# Patient Record
Sex: FEMALE | Race: OTHER | Employment: UNEMPLOYED | URBAN - METROPOLITAN AREA
[De-identification: names, ages, dates, MRNs, and addresses within clinical notes are randomized per-mention and may not be internally consistent; named-entity substitution may affect disease eponyms.]

---

## 2019-12-10 ENCOUNTER — APPOINTMENT (OUTPATIENT)
Dept: GENERAL RADIOLOGY | Facility: HOSPITAL | Age: 59
DRG: 638 | End: 2019-12-10
Attending: EMERGENCY MEDICINE
Payer: MEDICAID

## 2019-12-10 ENCOUNTER — HOSPITAL ENCOUNTER (INPATIENT)
Facility: HOSPITAL | Age: 59
LOS: 3 days | Discharge: HOME | DRG: 638 | End: 2019-12-13
Attending: EMERGENCY MEDICINE | Admitting: INTERNAL MEDICINE
Payer: MEDICAID

## 2019-12-10 ENCOUNTER — APPOINTMENT (OUTPATIENT)
Dept: GENERAL RADIOLOGY | Facility: HOSPITAL | Age: 59
DRG: 638 | End: 2019-12-10
Attending: INTERNAL MEDICINE
Payer: MEDICAID

## 2019-12-10 DIAGNOSIS — E11.621 DIABETIC ULCER OF LEFT MIDFOOT ASSOCIATED WITH TYPE 2 DIABETES MELLITUS, WITH NECROSIS OF MUSCLE (HCC): Primary | ICD-10-CM

## 2019-12-10 DIAGNOSIS — E11.649 TYPE 2 DIABETES MELLITUS WITH HYPOGLYCEMIA WITHOUT COMA, WITH LONG-TERM CURRENT USE OF INSULIN (HCC): ICD-10-CM

## 2019-12-10 DIAGNOSIS — R11.2 NAUSEA AND VOMITING IN ADULT: ICD-10-CM

## 2019-12-10 DIAGNOSIS — Z79.4 TYPE 2 DIABETES MELLITUS WITH HYPOGLYCEMIA WITHOUT COMA, WITH LONG-TERM CURRENT USE OF INSULIN (HCC): ICD-10-CM

## 2019-12-10 DIAGNOSIS — L97.423 DIABETIC ULCER OF LEFT MIDFOOT ASSOCIATED WITH TYPE 2 DIABETES MELLITUS, WITH NECROSIS OF MUSCLE (HCC): Primary | ICD-10-CM

## 2019-12-10 PROCEDURE — 85652 RBC SED RATE AUTOMATED: CPT | Performed by: INTERNAL MEDICINE

## 2019-12-10 PROCEDURE — 86140 C-REACTIVE PROTEIN: CPT | Performed by: INTERNAL MEDICINE

## 2019-12-10 PROCEDURE — 96375 TX/PRO/DX INJ NEW DRUG ADDON: CPT

## 2019-12-10 PROCEDURE — 99285 EMERGENCY DEPT VISIT HI MDM: CPT

## 2019-12-10 PROCEDURE — 83036 HEMOGLOBIN GLYCOSYLATED A1C: CPT | Performed by: INTERNAL MEDICINE

## 2019-12-10 PROCEDURE — 82962 GLUCOSE BLOOD TEST: CPT

## 2019-12-10 PROCEDURE — 73630 X-RAY EXAM OF FOOT: CPT | Performed by: EMERGENCY MEDICINE

## 2019-12-10 PROCEDURE — 96365 THER/PROPH/DIAG IV INF INIT: CPT

## 2019-12-10 PROCEDURE — 80048 BASIC METABOLIC PNL TOTAL CA: CPT | Performed by: EMERGENCY MEDICINE

## 2019-12-10 PROCEDURE — 74018 RADEX ABDOMEN 1 VIEW: CPT | Performed by: INTERNAL MEDICINE

## 2019-12-10 PROCEDURE — 96367 TX/PROPH/DG ADDL SEQ IV INF: CPT

## 2019-12-10 PROCEDURE — 85025 COMPLETE CBC W/AUTO DIFF WBC: CPT | Performed by: EMERGENCY MEDICINE

## 2019-12-10 RX ORDER — HYDROCODONE BITARTRATE AND ACETAMINOPHEN 5; 325 MG/1; MG/1
2 TABLET ORAL EVERY 4 HOURS PRN
Status: DISCONTINUED | OUTPATIENT
Start: 2019-12-10 | End: 2019-12-13

## 2019-12-10 RX ORDER — FUROSEMIDE 20 MG/1
20 TABLET ORAL DAILY
Status: DISCONTINUED | OUTPATIENT
Start: 2019-12-11 | End: 2019-12-11

## 2019-12-10 RX ORDER — FAMOTIDINE 20 MG/1
40 TABLET ORAL DAILY
Status: DISCONTINUED | OUTPATIENT
Start: 2019-12-11 | End: 2019-12-13

## 2019-12-10 RX ORDER — SODIUM PHOSPHATE, DIBASIC AND SODIUM PHOSPHATE, MONOBASIC 7; 19 G/133ML; G/133ML
1 ENEMA RECTAL ONCE AS NEEDED
Status: DISCONTINUED | OUTPATIENT
Start: 2019-12-10 | End: 2019-12-13

## 2019-12-10 RX ORDER — METOCLOPRAMIDE HYDROCHLORIDE 5 MG/ML
10 INJECTION INTRAMUSCULAR; INTRAVENOUS EVERY 8 HOURS PRN
Status: DISCONTINUED | OUTPATIENT
Start: 2019-12-10 | End: 2019-12-13

## 2019-12-10 RX ORDER — SODIUM CHLORIDE 9 MG/ML
INJECTION, SOLUTION INTRAVENOUS CONTINUOUS
Status: DISCONTINUED | OUTPATIENT
Start: 2019-12-10 | End: 2019-12-11

## 2019-12-10 RX ORDER — ONDANSETRON 2 MG/ML
4 INJECTION INTRAMUSCULAR; INTRAVENOUS EVERY 4 HOURS PRN
Status: DISCONTINUED | OUTPATIENT
Start: 2019-12-10 | End: 2019-12-10

## 2019-12-10 RX ORDER — ATORVASTATIN CALCIUM 20 MG/1
20 TABLET, FILM COATED ORAL NIGHTLY
Status: DISCONTINUED | OUTPATIENT
Start: 2019-12-10 | End: 2019-12-13

## 2019-12-10 RX ORDER — ACETAMINOPHEN 325 MG/1
650 TABLET ORAL EVERY 4 HOURS PRN
Status: DISCONTINUED | OUTPATIENT
Start: 2019-12-10 | End: 2019-12-13

## 2019-12-10 RX ORDER — DOCUSATE SODIUM 100 MG/1
100 CAPSULE, LIQUID FILLED ORAL 2 TIMES DAILY
Status: DISCONTINUED | OUTPATIENT
Start: 2019-12-10 | End: 2019-12-11

## 2019-12-10 RX ORDER — ISOSORBIDE MONONITRATE 30 MG/1
30 TABLET, EXTENDED RELEASE ORAL DAILY
Status: ON HOLD | COMMUNITY
End: 2020-11-07

## 2019-12-10 RX ORDER — LOSARTAN POTASSIUM 100 MG/1
100 TABLET ORAL DAILY
Status: ON HOLD | COMMUNITY
End: 2020-11-07

## 2019-12-10 RX ORDER — ONDANSETRON 2 MG/ML
4 INJECTION INTRAMUSCULAR; INTRAVENOUS ONCE
Status: COMPLETED | OUTPATIENT
Start: 2019-12-10 | End: 2019-12-10

## 2019-12-10 RX ORDER — BISACODYL 10 MG
10 SUPPOSITORY, RECTAL RECTAL
Status: DISCONTINUED | OUTPATIENT
Start: 2019-12-10 | End: 2019-12-13

## 2019-12-10 RX ORDER — CARVEDILOL 25 MG/1
25 TABLET ORAL 2 TIMES DAILY WITH MEALS
Status: DISCONTINUED | OUTPATIENT
Start: 2019-12-10 | End: 2019-12-13

## 2019-12-10 RX ORDER — ONDANSETRON 2 MG/ML
4 INJECTION INTRAMUSCULAR; INTRAVENOUS EVERY 6 HOURS PRN
Status: DISCONTINUED | OUTPATIENT
Start: 2019-12-10 | End: 2019-12-13

## 2019-12-10 RX ORDER — CARVEDILOL 25 MG/1
25 TABLET ORAL 2 TIMES DAILY WITH MEALS
COMMUNITY

## 2019-12-10 RX ORDER — DEXTROSE MONOHYDRATE 25 G/50ML
50 INJECTION, SOLUTION INTRAVENOUS AS NEEDED
Status: DISCONTINUED | OUTPATIENT
Start: 2019-12-10 | End: 2019-12-13

## 2019-12-10 RX ORDER — SODIUM CHLORIDE 0.9 % (FLUSH) 0.9 %
3 SYRINGE (ML) INJECTION AS NEEDED
Status: DISCONTINUED | OUTPATIENT
Start: 2019-12-10 | End: 2019-12-13

## 2019-12-10 RX ORDER — ATORVASTATIN CALCIUM 20 MG/1
20 TABLET, FILM COATED ORAL NIGHTLY
COMMUNITY

## 2019-12-10 RX ORDER — POLYETHYLENE GLYCOL 3350 17 G/17G
17 POWDER, FOR SOLUTION ORAL DAILY PRN
Status: DISCONTINUED | OUTPATIENT
Start: 2019-12-10 | End: 2019-12-13

## 2019-12-10 RX ORDER — DEXTROSE AND SODIUM CHLORIDE 5; .45 G/100ML; G/100ML
INJECTION, SOLUTION INTRAVENOUS ONCE
Status: COMPLETED | OUTPATIENT
Start: 2019-12-10 | End: 2019-12-10

## 2019-12-10 RX ORDER — FAMOTIDINE 20 MG/1
40 TABLET ORAL DAILY
Status: ON HOLD | COMMUNITY
End: 2021-04-16

## 2019-12-10 RX ORDER — IBUPROFEN 600 MG/1
600 TABLET ORAL ONCE
Status: COMPLETED | OUTPATIENT
Start: 2019-12-10 | End: 2019-12-10

## 2019-12-10 RX ORDER — HYDROCODONE BITARTRATE AND ACETAMINOPHEN 5; 325 MG/1; MG/1
1 TABLET ORAL EVERY 4 HOURS PRN
Status: DISCONTINUED | OUTPATIENT
Start: 2019-12-10 | End: 2019-12-13

## 2019-12-10 RX ORDER — ISOSORBIDE MONONITRATE 60 MG/1
30 TABLET, EXTENDED RELEASE ORAL DAILY
Status: DISCONTINUED | OUTPATIENT
Start: 2019-12-11 | End: 2019-12-13

## 2019-12-10 RX ORDER — LOSARTAN POTASSIUM 100 MG/1
100 TABLET ORAL DAILY
Status: DISCONTINUED | OUTPATIENT
Start: 2019-12-11 | End: 2019-12-13

## 2019-12-10 RX ORDER — POTASSIUM CHLORIDE 1500 MG/1
20 TABLET, FILM COATED, EXTENDED RELEASE ORAL 2 TIMES DAILY
COMMUNITY
End: 2021-04-15

## 2019-12-10 RX ORDER — FUROSEMIDE 20 MG/1
80 TABLET ORAL DAILY
Status: ON HOLD | COMMUNITY
End: 2020-11-07

## 2019-12-10 RX ORDER — METOPROLOL SUCCINATE 25 MG/1
25 TABLET, EXTENDED RELEASE ORAL DAILY
Status: ON HOLD | COMMUNITY
End: 2019-12-13

## 2019-12-10 NOTE — ED INITIAL ASSESSMENT (HPI)
Patient here with pain and swelling to her left lower leg that began last night. Patient had surgery on this ankle in May of this year. Patient began to vomit two days ago, and is unable to keep anything down. Patient also says she is very tired.

## 2019-12-10 NOTE — ED NOTES
Pt presents with several complaints including n/v, nonbloody emesis, x2 days and LLE pain and swelling x5 days. Endorses difficulty walking. On exam, L foot is swollen, red. Wound to underside of foot. Foul odor noted.  Pulses difficult to palpate  PMH DM,

## 2019-12-11 ENCOUNTER — APPOINTMENT (OUTPATIENT)
Dept: MRI IMAGING | Facility: HOSPITAL | Age: 59
DRG: 638 | End: 2019-12-11
Attending: INTERNAL MEDICINE
Payer: MEDICAID

## 2019-12-11 PROBLEM — I10 HTN (HYPERTENSION): Status: ACTIVE | Noted: 2019-12-11

## 2019-12-11 PROBLEM — I25.10 CAD (CORONARY ARTERY DISEASE): Status: ACTIVE | Noted: 2019-12-11

## 2019-12-11 PROBLEM — E78.5 HYPERLIPIDEMIA: Status: ACTIVE | Noted: 2019-12-11

## 2019-12-11 PROCEDURE — 85610 PROTHROMBIN TIME: CPT | Performed by: INTERNAL MEDICINE

## 2019-12-11 PROCEDURE — 82962 GLUCOSE BLOOD TEST: CPT

## 2019-12-11 PROCEDURE — 73720 MRI LWR EXTREMITY W/O&W/DYE: CPT | Performed by: INTERNAL MEDICINE

## 2019-12-11 PROCEDURE — A9575 INJ GADOTERATE MEGLUMI 0.1ML: HCPCS | Performed by: HOSPITALIST

## 2019-12-11 PROCEDURE — 83735 ASSAY OF MAGNESIUM: CPT | Performed by: INTERNAL MEDICINE

## 2019-12-11 PROCEDURE — 85027 COMPLETE CBC AUTOMATED: CPT | Performed by: INTERNAL MEDICINE

## 2019-12-11 PROCEDURE — 99213 OFFICE O/P EST LOW 20 MIN: CPT

## 2019-12-11 PROCEDURE — 80048 BASIC METABOLIC PNL TOTAL CA: CPT | Performed by: INTERNAL MEDICINE

## 2019-12-11 RX ORDER — BISACODYL 10 MG
10 SUPPOSITORY, RECTAL RECTAL
Status: DISCONTINUED | OUTPATIENT
Start: 2019-12-11 | End: 2019-12-11

## 2019-12-11 RX ORDER — SODIUM PHOSPHATE, DIBASIC AND SODIUM PHOSPHATE, MONOBASIC 7; 19 G/133ML; G/133ML
1 ENEMA RECTAL ONCE AS NEEDED
Status: DISCONTINUED | OUTPATIENT
Start: 2019-12-11 | End: 2019-12-11

## 2019-12-11 RX ORDER — DEXTROSE AND SODIUM CHLORIDE 5; .45 G/100ML; G/100ML
INJECTION, SOLUTION INTRAVENOUS CONTINUOUS
Status: DISCONTINUED | OUTPATIENT
Start: 2019-12-11 | End: 2019-12-11

## 2019-12-11 RX ORDER — POLYETHYLENE GLYCOL 3350 17 G/17G
17 POWDER, FOR SOLUTION ORAL DAILY
Status: DISCONTINUED | OUTPATIENT
Start: 2019-12-11 | End: 2019-12-13

## 2019-12-11 RX ORDER — DOCUSATE SODIUM 100 MG/1
100 CAPSULE, LIQUID FILLED ORAL 2 TIMES DAILY
Status: DISCONTINUED | OUTPATIENT
Start: 2019-12-11 | End: 2019-12-13

## 2019-12-11 RX ORDER — POLYETHYLENE GLYCOL 3350 17 G/17G
17 POWDER, FOR SOLUTION ORAL DAILY PRN
Status: DISCONTINUED | OUTPATIENT
Start: 2019-12-11 | End: 2019-12-11

## 2019-12-11 NOTE — CM/SW NOTE
12/11/19 1200   CM/SW Referral Data   Referral Source Physician   Reason for Referral Discharge planning   Informant Patient      Method of Interpretation Translation line    Translated To Assessment/Screening; Discharge;Education

## 2019-12-11 NOTE — CONSULTS
[facility]    Report of Consultation    Dennie Brick Patient Status:  Inpatient    1960 MRN [id number]   Location [facility] 5SW/SE Attending Alyse Estevez MD   [facility] Day # 1 PCP None Pcp     Date of Admission:  12/10/2019  Date PRN  •  PEG 3350 (MIRALAX) powder packet 17 g, 17 g, Oral, Daily PRN  •  magnesium hydroxide (MILK OF MAGNESIA) 400 MG/5ML suspension 30 mL, 30 mL, Oral, Daily PRN  •  bisacodyl (DULCOLAX) rectal suppository 10 mg, 10 mg, Rectal, Daily PRN  •  FLEET ENEMA secondary to the edema on the left lower extremity  Neurologic: Patient does not have good pain sensation. Musculoskeletal: The patient has good muscle strength she is ambulatory. He has a Charcot foot deformity.     Xr Abdomen (1 View) (cpt=74018)    Res to diabetic osteoarthropathy over active osteomyelitis. 3. Large plantar calcaneal spur with mild chronic plantar fasciitis.   4. Diffuse edema and severe atrophy throughout the visualized intrinsic foot musculature, likely relating to acute on chronic diab

## 2019-12-11 NOTE — PLAN OF CARE
Problem: Patient Centered Care  Goal: Patient preferences are identified and integrated in the patient's plan of care  Description  Interventions:  - What would you like us to know as we care for you?  \"I speak Kinyarwanda\"  - Provide timely, complete, and acc response  - Implement non-pharmacological measures as appropriate and evaluate response  - Consider cultural and social influences on pain and pain management  - Manage/alleviate anxiety  - Utilize distraction and/or relaxation techniques  - Monitor for op their own health  - Refer to Case Management Department for coordinating discharge planning if the patient needs post-hospital services based on physician/LIP order or complex needs related to functional status, cognitive ability or social support system

## 2019-12-11 NOTE — WOUND PROGRESS NOTE
WOUND CARE NOTE    History:  Past Medical History:   Diagnosis Date   • Coronary bypass graft mechanical complication    • Diabetes [facility])    • Essential hypertension    • Hyperlipemia      Past Surgical History:   Procedure Laterality Date   • CABG     • Spent 30 minutes.      Nikita Lockett RN  [address] [facility]  [phone]

## 2019-12-11 NOTE — PROGRESS NOTES
Called [facility] [phone]  For left lower extremity cam boot,orders faxed,per tech will be here this evening

## 2019-12-11 NOTE — PHYSICAL THERAPY NOTE
PT order received and chart reviewed. Per podiatry \" For right now the patient requires offloading will have to get her into a cam boot for offloading will call [facility] labs for that \"  Will follow up for skilled PT after cam boot is in place.

## 2019-12-11 NOTE — ED NOTES
Orders for admission, patient is aware of plan and ready to go upstairs. Any questions, please call ED RN Ernestina  at extension [phone].

## 2019-12-11 NOTE — H&P
[facility] Hospitalist Team  History and Physical  Admit Date:  12/10/19     ASSESSMENT / PLAN:   68-year-old female with history of CAD status post CABG, diabetes mellitus, hypertension, hyperlipidemia who presents with left foot pain with associated ulcer with movement in 2 days. She also noted that she was having left foot pain. She had a previous left foot fracture and had surgery in May 2019. She denies fever though she has been having chills. Patient tells me that she was told she has a bone infection. Tab, Take 40 mg by mouth daily. , Disp: , Rfl:   Potassium Chloride ER 20 MEQ Oral Tab CR, Take 20 mEq by mouth 2 (two) times daily. , Disp: , Rfl:   insulin glargine 100 UNIT/ML Subcutaneous Solution Pen-injector, Inject 40 Units into the skin 2 (two) times subcutaneous abscess. Diffuse T2 hyperintensity throughout the subcutaneous fat of the foot with overlying skin thickening could relate to edema or cellulitis; correlate clinically.  2. Extensive disorganization/fragmentation again noted involving the midf SpO2 98%   BMI 26.89 kg/m²     Gen: No acute distress, alert and oriented x3  Neck Supple, no JVD  Pulm: Lungs clear, normal respiratory effort,   CV: Heart with regular rate and rhythm, No murmurs, rubs, gallops  Abd: Abdomen soft, nontender, nondistende

## 2019-12-11 NOTE — PROGRESS NOTES
[facility] Dosing Service    Initial Pharmacokinetic Consult for Vancomycin Dosing     Saira Heard is a 62year old female who is being treated for cellulitis. Pharmacy has been asked to dose Vancomycin by Dr. Page Cost    She has No Known Allergies.     Cu

## 2019-12-11 NOTE — CONSULTS
[facility] AND [facility] Infectious Disease Consult    Hector Wong Patient Status:  Inpatient    1960 MRN [id number]   Location [facility] 5SW/SE Attending Adriana Cummins MD   Hosp Day # 1 PCP None Pcp     Reason for Consultation:   To help with i mL, Intravenous, PRN  •  acetaminophen (TYLENOL) tab 650 mg, 650 mg, Oral, Q4H PRN **OR** HYDROcodone-acetaminophen (NORCO) 5-325 MG per tab 1 tablet, 1 tablet, Oral, Q4H PRN **OR** HYDROcodone-acetaminophen (NORCO) 5-325 MG per tab 2 tablet, 2 tablet, [facility] cough, sputum, hemoptysis, chest pain, wheezing, dyspnea on exertion, or stridor. Cardiovascular: Negative for chest pain, palpitations, irregular heart beats, syncope, fatigue, orthopnea, paroxysmal nocturnal dyspnea, lower extremity edema.   Gastrointest chest rise   Chest wall: No tenderness or deformity. Heart: Regular rate and rhythm, normal S1S2, no murmur. Abdomen: soft, non-tender, non-distended, no masses, no guarding, no     rebound, positive BS.    Extremity: no edema, no cyanosis   Skin: No ra visualized intrinsic foot musculature, likely relating to acute on chronic diabetic myopathy.          Cultures:  Reviewed,     Assessment and Plan:  Patient Active Problem List:     Diabetic ulcer of left midfoot associated with type 2 diabetes mellitus, w

## 2019-12-11 NOTE — ED PROVIDER NOTES
Patient Seen in: [facility] AND [facility] Emergency Department    History   No chief complaint on file.       HPI    Patient with a past medical history of CAD and CABG, diabetes and hypertension presents to the ED complaining of swelling and pain to her left fo 25 mg by mouth 2 (two) times daily with meals. , Disp: , Rfl: , 12/9/2019 at Unknown time         No family history on file.     Smoking Status: Social History    Socioeconomic History      Marital status:       Spouse name: Not on file      Number of person, place, and time. Skin: Skin is warm and dry. Psychiatric: She has a normal mood and affect. Her behavior is normal.   Nursing note and vitals reviewed.       ED Course        Labs Reviewed   SED RATE, WESTERGREN (AUTOMATED) - Abnormal; Notable f Abnormal            Final result                                                 Please view results for these tests on the individual orders.    BASIC METABOLIC PANEL (8)   MAGNESIUM   CBC, PLATELET; NO DIFFERENTIAL   PROTHROMBIN TIME (PT)   RAINBOW DRAW B (DEX4) oral liquid 15 g (has no administration in time range)   Vancomycin HCl (VANCOCIN) 1,000 mg in sodium chloride 0.9% 250 mL IVPB add-vantage (has no administration in time range)   Piperacillin Sod-Tazobactam So (ZOSYN) 3.375 g in dextrose 5 % 100 mL Interpretation:   normal sinus rhythm    Differential Diagnosis/ Diagnostic Considerations: Diabetic left foot ulcer, foot cellulitis, dehydration, nausea vomiting    Medical Record Review: I personally reviewed available prior medical records for any rece Nausea and vomiting in adult R11.2 12/10/2019     Type 2 diabetes mellitus with hypoglycemia without coma, with long-term current use of insulin ([facility] E11.649, Z79.4 12/10/2019

## 2019-12-12 PROCEDURE — 97530 THERAPEUTIC ACTIVITIES: CPT

## 2019-12-12 PROCEDURE — 97162 PT EVAL MOD COMPLEX 30 MIN: CPT

## 2019-12-12 PROCEDURE — 82607 VITAMIN B-12: CPT | Performed by: NURSE PRACTITIONER

## 2019-12-12 PROCEDURE — 84466 ASSAY OF TRANSFERRIN: CPT | Performed by: NURSE PRACTITIONER

## 2019-12-12 PROCEDURE — 97165 OT EVAL LOW COMPLEX 30 MIN: CPT

## 2019-12-12 PROCEDURE — 80202 ASSAY OF VANCOMYCIN: CPT | Performed by: INTERNAL MEDICINE

## 2019-12-12 PROCEDURE — 80048 BASIC METABOLIC PNL TOTAL CA: CPT | Performed by: NURSE PRACTITIONER

## 2019-12-12 PROCEDURE — 83540 ASSAY OF IRON: CPT | Performed by: NURSE PRACTITIONER

## 2019-12-12 PROCEDURE — 82962 GLUCOSE BLOOD TEST: CPT

## 2019-12-12 PROCEDURE — 85025 COMPLETE CBC W/AUTO DIFF WBC: CPT | Performed by: NURSE PRACTITIONER

## 2019-12-12 PROCEDURE — 97116 GAIT TRAINING THERAPY: CPT

## 2019-12-12 RX ORDER — MELATONIN
325
Status: DISCONTINUED | OUTPATIENT
Start: 2019-12-12 | End: 2019-12-13

## 2019-12-12 RX ORDER — ENOXAPARIN SODIUM 100 MG/ML
40 INJECTION SUBCUTANEOUS DAILY
Status: DISCONTINUED | OUTPATIENT
Start: 2019-12-12 | End: 2019-12-13

## 2019-12-12 NOTE — PLAN OF CARE
Patient has diabetic ulcer on left foot. MRI negative for osteomyelitis. Wound services saw patient and ordered dressing changes daily and PRN. Dressing C/D/I overnight. Patient is up with rolling chair, NWB on left foot. Boot ordered. IV antibiotics. VSS. Problem: PAIN - ADULT  Goal: Verbalizes/displays adequate comfort level or patient's stated pain goal  Description  INTERVENTIONS:  - Encourage pt to monitor pain and request assistance  - Assess pain using appropriate pain scale  - Administer analgesics appropriate  - Identify discharge learning needs (meds, wound care, etc)  - Arrange for interpreters to assist at discharge as needed  - Consider post-discharge preferences of patient/family/discharge partner  - Complete POLST form as appropriate  - Assess

## 2019-12-12 NOTE — PLAN OF CARE
Problem: Patient Centered Care  Goal: Patient preferences are identified and integrated in the patient's plan of care  Description  Interventions:  - What would you like us to know as we care for you?  \"I speak Bengali\"  - Provide timely, complete, and acc response  - Implement non-pharmacological measures as appropriate and evaluate response  - Consider cultural and social influences on pain and pain management  - Manage/alleviate anxiety  - Utilize distraction and/or relaxation techniques  - Monitor for op their own health  - Refer to Case Management Department for coordinating discharge planning if the patient needs post-hospital services based on physician/LIP order or complex needs related to functional status, cognitive ability or social support system

## 2019-12-12 NOTE — PROGRESS NOTES
[facility] Hospitalist Team  Progress Note    Sara Terrymontrell Patient Status:  Inpatient    1960 MRN [id number]   Location [facility] 5SW/SE Attending Nakita Bass MD   Hosp Day # 2 PCP None Pcp     CC: Follow Up  PCP: None Pcp    SEE ATTENDING NOTE AT patient. Patient agrees with plan as detailed above.  Discussed plan of care with Dr. Marilyn Vargas RN, NP  [address] [facility] Team  Pager [phone]  Answering Service number: [phone]  12/12/2019  SUBJECTIVE:    line injection 3 mL, 3 mL, Intravenous, PRN  acetaminophen (TYLENOL) tab 650 mg, 650 mg, Oral, Q4H PRN    Or  HYDROcodone-acetaminophen (NORCO) 5-325 MG per tab 1 tablet, 1 tablet, Oral, Q4H PRN    Or  HYDROcodone-acetaminophen (NORCO) 5-325 MG per tab 2 tablet osteomyelitis. Dictated by (CST): Sarah Ariza MD on 12/10/2019 at 19:06     Approved by (CST): Hina Billingsley MD on 12/10/2019 at 19:08          Mri Foot (w+wo), Left (cpt=73720)    Result Date: 12/11/2019  CONCLUSION:  1.  Subcentim Ht 4' 11\" (1.499 m)   Wt 133 lb 2.5 oz (60.4 kg)   SpO2 100%   BMI 26.89 kg/m²      Gen: No acute distress, alert and oriented x3  Neck Supple, no JVD  Pulm: Lungs clear, normal respiratory effort   CV: Heart with regular rate and rhythm   Abd: Abdomen s

## 2019-12-12 NOTE — PROGRESS NOTES
[facility] Infectious Disease Progress Note    [facility] Patient Status:  Inpatient    1960 MRN [id number]   Location [facility] 5SW/SE Attending Dean Alvarado MD   Hosp Day # 2 PCP None Pcp     Subjective:  Pt states foot feels Nightly  •  famoTIDine (PEPCID) tab 40 mg, 40 mg, Oral, Daily  •  Isosorbide Mononitrate ER (IMDUR) 24 hr tab 30 mg, 30 mg, Oral, Daily  •  carvedilol (COREG) tab 25 mg, 25 mg, Oral, BID with meals  •  losartan (COZAAR) tab 100 mg, 100 mg, Oral, Daily  • concerns please call [facility] at [phone].      THELMA GOODMAN NP  12/12/2019  9:06 AM

## 2019-12-12 NOTE — PLAN OF CARE
Problem: Patient Centered Care  Goal: Patient preferences are identified and integrated in the patient's plan of care  Description  Interventions:  - What would you like us to know as we care for you?  \"I speak Turkish\"  - Provide timely, complete, and acc response  - Implement non-pharmacological measures as appropriate and evaluate response  - Consider cultural and social influences on pain and pain management  - Manage/alleviate anxiety  - Utilize distraction and/or relaxation techniques  - Monitor for op their own health  - Refer to Case Management Department for coordinating discharge planning if the patient needs post-hospital services based on physician/LIP order or complex needs related to functional status, cognitive ability or social support system

## 2019-12-12 NOTE — PHYSICAL THERAPY NOTE
PHYSICAL THERAPY EVALUATION - INPATIENT     Room Number: 546/546-A  Evaluation Date: 12/12/2019  Type of Evaluation: Initial   Physician Order: PT Eval and Treat       Reason for Therapy: Mobility Dysfunction and Discharge Planning    PHYSICAL THERAPY ASS Hyperlipidemia    HTN (hypertension)      Past Medical History  Past Medical History:   Diagnosis Date   • Coronary bypass graft mechanical complication    • Diabetes [facility])    • Essential hypertension    • Hyperlipemia        Past Surgical History  Past Ralph Standardized Score (AM-PAC Scale): 43.63   CMS Modifier (G-Code): CK    FUNCTIONAL ABILITY STATUS  Gait Assessment   Gait Assistance: Contact guard assist  Distance (ft): (3)  Assistive Device: Rolling walker        Comment : (pt's offloading boots dista

## 2019-12-12 NOTE — OCCUPATIONAL THERAPY NOTE
OCCUPATIONAL THERAPY EVALUATION - INPATIENT     Room Number: 546/546-A  Evaluation Date: 12/12/2019  Type of Evaluation: Initial  Presenting Problem: (diabetic L foot wound)    Physician Order: IP Consult to Occupational Therapy  Reason for Therapy: ADL/IA DISCHARGE RECOMMENDATIONS  OT Discharge Recommendations: 24 hour care/supervision;Home  OT Device Recommendations: TBD    PLAN  OT Treatment Plan: Patient/Family education;Patient/Family training; Endurance training;Functional transfer training;ADL traini another person does the patient currently need…  -   Putting on and taking off regular lower body clothing?: A Lot  -   Bathing (including washing, rinsing, drying)?: A Little  -   Toileting, which includes using toilet, bedpan or urinal? : A Little  -   P

## 2019-12-12 NOTE — PROGRESS NOTES
[facility] dosing service    Follow-up Pharmacokinetic Consult for Vancomycin Dosing     Kira Etienne is a 62year old female who is being treated for diabetic foot. Patient is on day 3 of Vancomycin and is currently receiving 1 gm IV Q 12 hours.   Goal

## 2019-12-13 VITALS
WEIGHT: 133.19 LBS | BODY MASS INDEX: 26.85 KG/M2 | SYSTOLIC BLOOD PRESSURE: 155 MMHG | TEMPERATURE: 98 F | DIASTOLIC BLOOD PRESSURE: 71 MMHG | RESPIRATION RATE: 18 BRPM | HEART RATE: 74 BPM | HEIGHT: 59 IN | OXYGEN SATURATION: 96 %

## 2019-12-13 PROBLEM — R11.2 NAUSEA AND VOMITING IN ADULT: Status: RESOLVED | Noted: 2019-12-10 | Resolved: 2019-12-13

## 2019-12-13 PROCEDURE — 85025 COMPLETE CBC W/AUTO DIFF WBC: CPT | Performed by: NURSE PRACTITIONER

## 2019-12-13 PROCEDURE — 80048 BASIC METABOLIC PNL TOTAL CA: CPT | Performed by: NURSE PRACTITIONER

## 2019-12-13 PROCEDURE — 99213 OFFICE O/P EST LOW 20 MIN: CPT

## 2019-12-13 PROCEDURE — 97530 THERAPEUTIC ACTIVITIES: CPT

## 2019-12-13 PROCEDURE — 97116 GAIT TRAINING THERAPY: CPT

## 2019-12-13 PROCEDURE — 82962 GLUCOSE BLOOD TEST: CPT

## 2019-12-13 PROCEDURE — 0HBNXZZ EXCISION OF LEFT FOOT SKIN, EXTERNAL APPROACH: ICD-10-PCS | Performed by: PODIATRIST

## 2019-12-13 PROCEDURE — 90471 IMMUNIZATION ADMIN: CPT

## 2019-12-13 RX ORDER — ALBUTEROL SULFATE 2.5 MG/3ML
SOLUTION RESPIRATORY (INHALATION) EVERY 6 HOURS PRN
Status: ON HOLD | COMMUNITY
End: 2021-04-16

## 2019-12-13 RX ORDER — AMOXICILLIN AND CLAVULANATE POTASSIUM 875; 125 MG/1; MG/1
1 TABLET, FILM COATED ORAL 2 TIMES DAILY
Qty: 28 TABLET | Refills: 0 | Status: SHIPPED | COMMUNITY
Start: 2019-12-13 | End: 2019-12-13

## 2019-12-13 RX ORDER — AMOXICILLIN AND CLAVULANATE POTASSIUM 875; 125 MG/1; MG/1
1 TABLET, FILM COATED ORAL 2 TIMES DAILY
Qty: 28 TABLET | Refills: 0 | Status: SHIPPED | COMMUNITY
Start: 2019-12-13 | End: 2020-01-07 | Stop reason: ALTCHOICE

## 2019-12-13 RX ORDER — POLYETHYLENE GLYCOL 3350 17 G/17G
17 POWDER, FOR SOLUTION ORAL DAILY
Qty: 1 EACH | Refills: 0 | Status: SHIPPED | COMMUNITY
Start: 2019-12-14 | End: 2019-12-13

## 2019-12-13 RX ORDER — MELATONIN
325
Qty: 30 TABLET | Refills: 0 | Status: SHIPPED | OUTPATIENT
Start: 2019-12-14 | End: 2020-01-23

## 2019-12-13 NOTE — WOUND PROGRESS NOTE
Pt seen for left plantar foot dm wound, s/p bedside debridement this morning with Dr. Gabbi Black. No family present. Pt was in the chair, boot in place which was removed as was the dressing.  There was a good amount of bloody strike though on the dressing, rem

## 2019-12-13 NOTE — DISCHARGE SUMMARY
[facility] Hospitalist Discharge Summary   Patient ID:  Saira Heard  [id number]  62year old  5/13/1960    Admit date: 12/10/2019  Discharge date: 12/13/2019    Primary Care Physician: None Pcp   Attending Physician: Masoud Bolton MD   Consults:   Consultants     P hypertension, hyperlipidemia who presents with left foot pain with associated ulcer with previous left foot fracture with OR 5/2019 as well as complaints of nausea and vomiting. Etiology of N/V unknown but pt improved.  Pt with no evidence of OM on MRI,  S/ Approved by (CST): Leila Hall MD on 12/11/2019 at 10:46          Xr Foot, Complete (min 3 Views), Left (cpt=73630)    Result Date: 12/10/2019  CONCLUSION:  1. Postop changes in the midfoot. 2. No radiographic evidence of osteomyelitis.     Dictat these medications    ferrous sulfate 325 (65 FE) MG Tbec  Take 1 tablet (325 mg total) by mouth daily with breakfast.  Start taking on:  December 14, 2019        Ulysses Blitz taking these medications    atorvastatin 20 MG Tabs  Commonly known as:  LIPITOR sugars in the hospital, take 12 units two times daily.  Check blood sugars and bring in blood sugar log to primary  Follow dietary recommendations      Wound care as per wound nurse      Follow up with podiatry in 1-2 weeks to assess wound      Follow up wi

## 2019-12-13 NOTE — PHYSICAL THERAPY NOTE
PHYSICAL THERAPY TREATMENT NOTE - INPATIENT     Room Number: 546/546-A            Problem List  Principal Problem:    Diabetic ulcer of left midfoot associated with type 2 diabetes mellitus, with necrosis of muscle ([address]  Active Problems:    Type 2 diabete of the bed?: None   How much help from another person does the patient currently need. ..   -   Moving to and from a bed to a chair (including a wheelchair)?: A Little   -   Need to walk in hospital room?: A Little   -   Climbing 3-5 steps with a railing?:

## 2019-12-13 NOTE — PROGRESS NOTES
12/13/19  0444   BP: 137/49   Pulse: 70   Resp: 16   Temp: 98.3 °F (36.8 °C)   Patient was seen resting comfortably in bed. She has a diabetic neuropathic ulcer secondary to Charcot foot breakdown underneath the cuboid MRI negative for osteomyelitis. boot.  We will hold Lovenox for today because of hemorrhaging begin again tomorrow. I will follow-up with her again tomorrow.

## 2019-12-13 NOTE — PROGRESS NOTES
[facility] AND [facility] Infectious Disease  Progress Note    Cherry Lyme Patient Status:  Inpatient    1960 MRN [id number]   Location [facility] 5SW/SE Attending Susannah Ram MD   Hosp Day # 3 PCP None Pcp     Subjective:  Patient seen/examine osteoarthropathy and/or prior trauma. Postoperative changes are again noted from a previous open reduction internal fixation of the midfoot structures with resulting susceptibility artifact that limits evaluation of the surrounding marrow/soft tissues.   [place] [facility]

## 2019-12-13 NOTE — PLAN OF CARE
Pt alert, reports no pain. Dressing to left foot dry and intact. Family at bedside. VSS. No acute distress noted.     Problem: Patient Centered Care  Goal: Patient preferences are identified and integrated in the patient's plan of care  Description  In assistance  - Assess pain using appropriate pain scale  - Administer analgesics based on type and severity of pain and evaluate response  - Implement non-pharmacological measures as appropriate and evaluate response  - Consider cultural and social influenc patient/family/discharge partner  - Complete POLST form as appropriate  - Assess patient's ability to be responsible for managing their own health  - Refer to Case Management Department for coordinating discharge planning if the patient needs post-hospital Progressing  Goal: Patient/Family Short Term Goal  Description  Patient's Short Term Goal: \"decrease swelling on left foot\"    Interventions:   - Podiatry consult  - wound consult  - iv abx  -id consult   - See additional Care Plan goals for specific int affect risk of falls.   - [place] fall precautions as indicated by assessment.  - Educate pt/family on patient safety including physical limitations  - Instruct pt to call for assistance with activity based on assessment  - Modify environment to reduce ri patient  Outcome: Progressing

## 2019-12-13 NOTE — PROGRESS NOTES
[facility] Hospitalist Team  Progress Note    Anastasia Joseph Patient Status:  Inpatient    1960 MRN [id number]   Location [facility] 5SW/SE Attending Tanya Sparks MD   Hosp Day # 3 PCP None Pcp     CC: Follow Up  PCP: None Pcp    SEE ATTENDING NOTE AT podiatry   Daughter given update at [facility], she will clarify pt home meds for D/C  PCP: None Pcp        Concerns regarding plan of care were discussed with patient. Patient agrees with plan as detailed above.  Discussed plan of care with Dr. Marilyn Olvera Daily with breakfast  Enoxaparin Sodium (LOVENOX) 40 MG/0.4ML injection 40 mg, 40 mg, Subcutaneous, Daily  docusate sodium (COLACE) cap 100 mg, 100 mg, Oral, BID  PEG 3350 (MIRALAX) powder packet 17 g, 17 g, Oral, Daily  Insulin Aspart Pen (NOVOLOG) 100 UN in the chest and both femoral regions. Dictated by (CST): Drew Aragon MD on 12/11/2019 at 10:41     Approved by (CST):  Drew Aragon MD on 12/11/2019 at 10:46          Xr Foot, Complete (min 3 Views), Left (cpt=73630)    Result Date: 12/10/201 Aneta Youngblood MD on 12/11/2019 at 8:36              SEE ATTENDING NOTE BELOW:   Patient seen and examined independently. Discussed with APN and agree with note above.     S: pain improved feeling better, no fevers or chills, no headaches or vision    objective

## 2019-12-16 ENCOUNTER — TELEPHONE (OUTPATIENT)
Dept: MEDSURG UNIT | Facility: HOSPITAL | Age: 59
End: 2019-12-16

## 2019-12-17 ENCOUNTER — TELEPHONE (OUTPATIENT)
Dept: MEDSURG UNIT | Facility: HOSPITAL | Age: 59
End: 2019-12-17

## 2020-01-07 ENCOUNTER — OFFICE VISIT (OUTPATIENT)
Dept: PODIATRY CLINIC | Facility: CLINIC | Age: 60
End: 2020-01-07
Payer: MEDICAID

## 2020-01-07 ENCOUNTER — APPOINTMENT (OUTPATIENT)
Dept: WOUND CARE | Facility: HOSPITAL | Age: 60
End: 2020-01-07
Attending: NURSE PRACTITIONER
Payer: MEDICAID

## 2020-01-07 DIAGNOSIS — E11.610 CHARCOT FOOT DUE TO DIABETES MELLITUS (HCC): ICD-10-CM

## 2020-01-07 DIAGNOSIS — E11.649 TYPE 2 DIABETES MELLITUS WITH HYPOGLYCEMIA WITHOUT COMA, WITH LONG-TERM CURRENT USE OF INSULIN (HCC): ICD-10-CM

## 2020-01-07 DIAGNOSIS — L97.423 DIABETIC ULCER OF LEFT MIDFOOT ASSOCIATED WITH TYPE 2 DIABETES MELLITUS, WITH NECROSIS OF MUSCLE (HCC): Primary | ICD-10-CM

## 2020-01-07 DIAGNOSIS — Z79.4 TYPE 2 DIABETES MELLITUS WITH HYPOGLYCEMIA WITHOUT COMA, WITH LONG-TERM CURRENT USE OF INSULIN (HCC): ICD-10-CM

## 2020-01-07 DIAGNOSIS — E11.621 DIABETIC ULCER OF LEFT MIDFOOT ASSOCIATED WITH TYPE 2 DIABETES MELLITUS, WITH NECROSIS OF MUSCLE (HCC): Primary | ICD-10-CM

## 2020-01-07 PROCEDURE — 99213 OFFICE O/P EST LOW 20 MIN: CPT | Performed by: PODIATRIST

## 2020-01-07 NOTE — PROGRESS NOTES
Kira Etienne is a 62year old female. Patient presents with: Follow - Up: hospital follow up. admitted 12/10-12/13/19. diabetic ulcer plantar midfoot, left lower extremity. pt to office wearing post op shoe. this wound has scant red colored drainage.  bg t hypertension    • Hyperlipemia       Past Surgical History:   Procedure Laterality Date   • CABG     • FOOT SURGERY Left       Family History   Problem Relation Age of Onset   • Diabetes Mother    • Heart Attack Father    • Diabetes Father       Social His hypoglycemia without coma, with long-term current use of insulin (HCC)  -     DME - EXTERNAL   -     [facility] WOUND EVAL    Charcot foot due to diabetes mellitus ([facility]  -     DME - EXTERNAL   -     [facility] WOUND EVAL        Plan:  Today just prescribed mupirocin oin

## 2020-01-14 ENCOUNTER — OFFICE VISIT (OUTPATIENT)
Dept: PODIATRY CLINIC | Facility: CLINIC | Age: 60
End: 2020-01-14
Payer: MEDICAID

## 2020-01-14 ENCOUNTER — TELEPHONE (OUTPATIENT)
Dept: PODIATRY CLINIC | Facility: CLINIC | Age: 60
End: 2020-01-14

## 2020-01-14 DIAGNOSIS — L97.423 DIABETIC ULCER OF LEFT MIDFOOT ASSOCIATED WITH TYPE 2 DIABETES MELLITUS, WITH NECROSIS OF MUSCLE (HCC): ICD-10-CM

## 2020-01-14 DIAGNOSIS — E11.610 CHARCOT FOOT DUE TO DIABETES MELLITUS (HCC): Primary | ICD-10-CM

## 2020-01-14 DIAGNOSIS — E11.621 DIABETIC ULCER OF LEFT MIDFOOT ASSOCIATED WITH TYPE 2 DIABETES MELLITUS, WITH NECROSIS OF MUSCLE (HCC): ICD-10-CM

## 2020-01-14 DIAGNOSIS — E11.649 TYPE 2 DIABETES MELLITUS WITH HYPOGLYCEMIA WITHOUT COMA, WITH LONG-TERM CURRENT USE OF INSULIN (HCC): ICD-10-CM

## 2020-01-14 DIAGNOSIS — Z79.4 TYPE 2 DIABETES MELLITUS WITH HYPOGLYCEMIA WITHOUT COMA, WITH LONG-TERM CURRENT USE OF INSULIN (HCC): ICD-10-CM

## 2020-01-14 PROCEDURE — 99213 OFFICE O/P EST LOW 20 MIN: CPT | Performed by: PODIATRIST

## 2020-01-14 RX ORDER — LATANOPROST 50 UG/ML
SOLUTION/ DROPS OPHTHALMIC
Status: ON HOLD | COMMUNITY
Start: 2020-01-06 | End: 2021-04-16

## 2020-01-14 RX ORDER — FAMOTIDINE 20 MG/1
TABLET ORAL
COMMUNITY
Start: 2020-01-10 | End: 2020-01-14 | Stop reason: DRUGHIGH

## 2020-01-14 RX ORDER — ASPIRIN 81 MG/1
TABLET, COATED ORAL
Status: ON HOLD | COMMUNITY
Start: 2020-01-06 | End: 2021-04-16

## 2020-01-14 NOTE — TELEPHONE ENCOUNTER
Lm - you have an appt tomorrow at [facility], you must go to that appt. You do not have to come to this appt today with dr garcia.  pls call back to confirm you got the msg.     (phone room - pls confirm the pt is going to tomorrow appt at wound car

## 2020-01-15 ENCOUNTER — OFFICE VISIT (OUTPATIENT)
Dept: WOUND CARE | Facility: HOSPITAL | Age: 60
End: 2020-01-15
Attending: INTERNAL MEDICINE
Payer: MEDICAID

## 2020-01-15 DIAGNOSIS — L97.509 TYPE 2 DIABETES MELLITUS WITH FOOT ULCER (HCC): ICD-10-CM

## 2020-01-15 DIAGNOSIS — S91.301S OPEN WOUND OF RIGHT FOOT, SEQUELA: ICD-10-CM

## 2020-01-15 DIAGNOSIS — S91.309A MULTIPLE OPEN WOUNDS OF FOOT: Primary | ICD-10-CM

## 2020-01-15 DIAGNOSIS — E11.621 TYPE 2 DIABETES MELLITUS WITH FOOT ULCER (HCC): ICD-10-CM

## 2020-01-15 LAB — GLUCOSE BLD-MCNC: 201 MG/DL (ref 70–99)

## 2020-01-15 PROCEDURE — 11042 DBRDMT SUBQ TIS 1ST 20SQCM/<: CPT

## 2020-01-15 PROCEDURE — 11045 DBRDMT SUBQ TISS EACH ADDL: CPT

## 2020-01-15 PROCEDURE — 97597 DBRDMT OPN WND 1ST 20 CM/<: CPT

## 2020-01-15 PROCEDURE — 99215 OFFICE O/P EST HI 40 MIN: CPT

## 2020-01-15 PROCEDURE — 82962 GLUCOSE BLOOD TEST: CPT

## 2020-01-15 NOTE — PROGRESS NOTES
Chief Complaint  This information was obtained from the patient  Patient here for initial visit for wounds to her left foot, son states they are been open for about a month and a half.      Allergies  No known Allergies    HPI  This information was obta follow-up notes from the podiatrist and infectious disease specialist were reviewed. Mri Foot (w+wo), Left (cpt=73720)    Result Date: 12/11/2019  CONCLUSION: 1.  Subcentimeter shallow cutaneous ulcer along the plantar aspect of the midfoot at the level file    Family History  Problem Relation Age of Onset  • Diabetes Mother   • Heart Attack Father   • Diabetes Father    Family History  This information was obtained from the patient  Cancer - No History, Diabetes - Mother, Father, Heart Disease - Father, Diabetic Ulcer and has received a status of Not Healed. Initial wound encounter measurements are 4cm length x 10cm width x 0.1cm depth, with an area of 40 sq cm and a volume of 4 cubic cm. No tunneling has been noted. No sinus tract has been noted.  No unde patient reports a wound pain of level 0/10. The wound margin is well defined. Wound bed has % pink, spongy granulation. The periwound skin color is normal. The periwound skin exhibited: Edema, Callus, Dry/Scaly.  The temperature of the periwound ski chronic ulcer of left heel and midfoot with fat layer exposed  (Encounter Diagnosis) S91.301S - Unspecified open wound, right foot, sequela  (Encounter Diagnosis) A52.16 - Charcot's arthropathy (tabetic)        Procedures    Wound #1  Wound #1 (Diabetic Ul following instrument(s) were used: curette. Pain control was achieved using 4% Lido. A time out was not conducted prior to the start of the procedure. A moderate amount of bleeding was controlled with pressure.  The procedure was tolerated well with a pain Therapy:  Tubigrip  Compression to Left Leg  Avoid prolonged standing in one place. Elevate leg(s)as much as possible. Off-Loading  Pressure relief shoe / inserts / foams  Left foot    Follow-Up Appointments  Return Appointment in 1 week.     Misc/Addit

## 2020-01-17 NOTE — PROGRESS NOTES
Bi Andrews is a 62year old female. Patient presents with: Follow - Up: LOV 1/7/2020. had had an appt at wound clinic, but was late, so not seen. appt scheduled for tomorrow. pt to office wearing right post op shoe and left cam boot.  pt has been dress Hyperlipemia       Past Surgical History:   Procedure Laterality Date   • CABG     • FOOT SURGERY Left       Family History   Problem Relation Age of Onset   • Diabetes Mother    • Heart Attack Father    • Diabetes Father       Social History    Socioecono in and make sure that they have home health or supplies delivered to them and they know how to change dressings and promote healing. They understood. The patient indicates understanding of these issues and agrees to the plan.     Jaky Andrade DPM

## 2020-01-24 ENCOUNTER — OFFICE VISIT (OUTPATIENT)
Dept: WOUND CARE | Facility: HOSPITAL | Age: 60
End: 2020-01-24
Attending: INTERNAL MEDICINE
Payer: MEDICAID

## 2020-01-24 DIAGNOSIS — E11.621 TYPE 2 DIABETES MELLITUS WITH FOOT ULCER, UNSPECIFIED WHETHER LONG TERM INSULIN USE (HCC): Primary | ICD-10-CM

## 2020-01-24 DIAGNOSIS — L97.509 TYPE 2 DIABETES MELLITUS WITH FOOT ULCER, UNSPECIFIED WHETHER LONG TERM INSULIN USE (HCC): Primary | ICD-10-CM

## 2020-01-24 DIAGNOSIS — S91.309A MULTIPLE OPEN WOUNDS OF FOOT: ICD-10-CM

## 2020-01-24 LAB — GLUCOSE BLD-MCNC: 105 MG/DL (ref 70–99)

## 2020-01-24 PROCEDURE — 11045 DBRDMT SUBQ TISS EACH ADDL: CPT

## 2020-01-24 PROCEDURE — 82962 GLUCOSE BLOOD TEST: CPT

## 2020-01-24 PROCEDURE — 11042 DBRDMT SUBQ TIS 1ST 20SQCM/<: CPT

## 2020-01-24 PROCEDURE — 97597 DBRDMT OPN WND 1ST 20 CM/<: CPT

## 2020-01-31 ENCOUNTER — OFFICE VISIT (OUTPATIENT)
Dept: WOUND CARE | Facility: HOSPITAL | Age: 60
End: 2020-01-31
Attending: INTERNAL MEDICINE
Payer: MEDICAID

## 2020-01-31 DIAGNOSIS — E11.621 TYPE 2 DIABETES MELLITUS WITH FOOT ULCER, UNSPECIFIED WHETHER LONG TERM INSULIN USE (HCC): Primary | ICD-10-CM

## 2020-01-31 DIAGNOSIS — L97.509 TYPE 2 DIABETES MELLITUS WITH FOOT ULCER, UNSPECIFIED WHETHER LONG TERM INSULIN USE (HCC): Primary | ICD-10-CM

## 2020-01-31 DIAGNOSIS — S91.309A MULTIPLE OPEN WOUNDS OF FOOT: ICD-10-CM

## 2020-01-31 LAB — GLUCOSE BLD-MCNC: 227 MG/DL (ref 70–99)

## 2020-01-31 PROCEDURE — 87077 CULTURE AEROBIC IDENTIFY: CPT

## 2020-01-31 PROCEDURE — 82962 GLUCOSE BLOOD TEST: CPT

## 2020-01-31 PROCEDURE — 87070 CULTURE OTHR SPECIMN AEROBIC: CPT

## 2020-01-31 PROCEDURE — 87186 SC STD MICRODIL/AGAR DIL: CPT

## 2020-01-31 PROCEDURE — 87205 SMEAR GRAM STAIN: CPT

## 2020-01-31 PROCEDURE — 11042 DBRDMT SUBQ TIS 1ST 20SQCM/<: CPT

## 2020-01-31 RX ORDER — SULFAMETHOXAZOLE AND TRIMETHOPRIM 800; 160 MG/1; MG/1
1 TABLET ORAL 2 TIMES DAILY
Qty: 14 TABLET | Refills: 0 | Status: SHIPPED | OUTPATIENT
Start: 2020-01-31 | End: 2020-02-07

## 2020-01-31 NOTE — PROGRESS NOTES
Chief Complaint  This information was obtained from the patient  Patient here for initial visit follow up visit for her left foot. Denies any pain, new or worsening symptoms.     Allergies  No known Allergies    HPI  This information was obtained from the metformin-however there is questionable compliance issues. Her blood sugar today is in 200s. Of note she has history of coronary artery disease status post CABG.     Vascular exam done including the visit today revealed triphasic pulse waveforms on the le History:  Diagnosis Date  • Coronary bypass graft mechanical complication  • Diabetes ([facility] [address]  • Essential hypertension  • Hyperlipemia    Past Surgical History:  Procedure Laterality Date  • CABG  • FOOT SURGERY Left    Social History   Tobacco Use     Smok undermining has been noted. There is a small amount of serous drainage noted which has no odor. The patient reports a wound pain of level 0/10. The wound margin is well defined. Wound bed has % epithelialization, 1-25% eschar.   The periwound skin col on the left, dorsal foot. An Excisional/Surgical debridement Total area debrided was 36.1 sq cm. was performed by Fabrice Bravo MD. Subcutaneous was removed along with devitalized tissue: biofilm, fibrin, and slough.  The following instrument(s) wer 1.8cm length x 1.9cm width x 0.1cm depth; with an area of 3.42 sq cm and a volume of 0.342 cubic cm;        Plan    Wound Orders:  Wound #1 Left, Dorsal Foot    Topicals:  Initial Anesthetic Order: Apply lidocaine to wound bed on all future wound center vi Orders  Supplement with a daily multivitamin. Increase dietary protein    Care summary  Discussed the Plan of Care at bedside with patient. The patient verbally acknowledges understanding of all instructions and all questions were answered.   Perfusion ass

## 2020-02-07 ENCOUNTER — APPOINTMENT (OUTPATIENT)
Dept: WOUND CARE | Facility: HOSPITAL | Age: 60
End: 2020-02-07
Attending: INTERNAL MEDICINE
Payer: MEDICAID

## 2020-02-21 ENCOUNTER — OFFICE VISIT (OUTPATIENT)
Dept: WOUND CARE | Facility: HOSPITAL | Age: 60
End: 2020-02-21
Attending: INTERNAL MEDICINE
Payer: MEDICAID

## 2020-02-21 DIAGNOSIS — L97.509 TYPE 2 DIABETES MELLITUS WITH FOOT ULCER, UNSPECIFIED WHETHER LONG TERM INSULIN USE (HCC): Primary | ICD-10-CM

## 2020-02-21 DIAGNOSIS — E11.621 TYPE 2 DIABETES MELLITUS WITH FOOT ULCER, UNSPECIFIED WHETHER LONG TERM INSULIN USE (HCC): Primary | ICD-10-CM

## 2020-02-21 LAB — GLUCOSE BLD-MCNC: 260 MG/DL (ref 70–99)

## 2020-02-21 PROCEDURE — 82962 GLUCOSE BLOOD TEST: CPT

## 2020-02-21 PROCEDURE — 11042 DBRDMT SUBQ TIS 1ST 20SQCM/<: CPT

## 2020-02-21 RX ORDER — SULFAMETHOXAZOLE AND TRIMETHOPRIM 400; 80 MG/1; MG/1
1 TABLET ORAL DAILY
Qty: 28 TABLET | Refills: 0 | Status: SHIPPED | OUTPATIENT
Start: 2020-02-21 | End: 2020-03-20

## 2020-02-21 NOTE — PROGRESS NOTES
Chief Complaint  This information was obtained from the patient  Patient here for follow up visit for her left foot. Denies any pain, new or worsening symptoms.     Allergies  No known Allergies    HPI  This information was obtained from the patient    2/ hemoglobin A1c is 9.7. She is on Levemir and metformin-however there is questionable compliance issues. Her blood sugar today is in 200s. Of note she has history of coronary artery disease status post CABG.     Vascular exam done including the visit toda at 8:36          Past Medical History:  Diagnosis Date  • Coronary bypass graft mechanical complication  • Diabetes [facility])  • Essential hypertension  • Hyperlipemia    Past Surgical History:  Procedure Laterality Date  • CABG  • FOOT SURGERY Left    Social and a volume of 0.056 cubic cm. No tunneling has been noted. No sinus tract has been noted. No undermining has been noted. There is a small amount of serous drainage noted which has no odor. The patient reports a wound pain of level 0/10.  The wound margin Charcot's arthropathy (tabetic)        Procedures    Wound #1  Wound #1 (Diabetic Ulcer) is located on the left, dorsal foot.  A selective debridement with a total area debrided of 9.92 sq cm was performed by Lin Pompa MD. to remove devitalized Leg  Avoid prolonged standing in one place. Elevate leg(s)as much as possible. Off-Loading  Pressure relief shoe / inserts / foams  Left foot    Follow-Up Appointments  Return Appointment in 2 weeks. Please schedule for 15 minute time slot.     Misc/Ad possible. Off-Loading  Pressure relief shoe / inserts / foams  Left foot    Follow-Up Appointments  Return Appointment in 2 weeks. Please schedule for 15 minute time slot. Misc/Additional Orders  Supplement with a daily multivitamin.   Increase dieta

## 2020-03-06 ENCOUNTER — APPOINTMENT (OUTPATIENT)
Dept: WOUND CARE | Facility: HOSPITAL | Age: 60
End: 2020-03-06
Attending: INTERNAL MEDICINE
Payer: MEDICAID

## 2020-03-25 ENCOUNTER — APPOINTMENT (OUTPATIENT)
Dept: WOUND CARE | Facility: HOSPITAL | Age: 60
End: 2020-03-25
Attending: NURSE PRACTITIONER
Payer: MEDICAID

## 2020-03-27 ENCOUNTER — LAB ENCOUNTER (OUTPATIENT)
Dept: LAB | Facility: HOSPITAL | Age: 60
End: 2020-03-27
Attending: NURSE PRACTITIONER
Payer: MEDICAID

## 2020-03-27 ENCOUNTER — HOSPITAL ENCOUNTER (OUTPATIENT)
Dept: GENERAL RADIOLOGY | Facility: HOSPITAL | Age: 60
Discharge: HOME | End: 2020-03-27
Attending: NURSE PRACTITIONER
Payer: MEDICAID

## 2020-03-27 ENCOUNTER — OFFICE VISIT (OUTPATIENT)
Dept: WOUND CARE | Facility: HOSPITAL | Age: 60
End: 2020-03-27
Attending: NURSE PRACTITIONER
Payer: MEDICAID

## 2020-03-27 DIAGNOSIS — L97.509 TYPE 2 DIABETES MELLITUS WITH FOOT ULCER, UNSPECIFIED WHETHER LONG TERM INSULIN USE (HCC): ICD-10-CM

## 2020-03-27 DIAGNOSIS — L97.423 DIABETIC ULCER OF LEFT MIDFOOT ASSOCIATED WITH TYPE 2 DIABETES MELLITUS, WITH NECROSIS OF MUSCLE (HCC): ICD-10-CM

## 2020-03-27 DIAGNOSIS — E08.621 DIABETIC ULCER OF MIDFOOT ASSOCIATED WITH DIABETES MELLITUS DUE TO UNDERLYING CONDITION, WITH FAT LAYER EXPOSED, UNSPECIFIED LATERALITY (HCC): ICD-10-CM

## 2020-03-27 DIAGNOSIS — L97.402 DIABETIC ULCER OF MIDFOOT ASSOCIATED WITH DIABETES MELLITUS DUE TO UNDERLYING CONDITION, WITH FAT LAYER EXPOSED, UNSPECIFIED LATERALITY (HCC): ICD-10-CM

## 2020-03-27 DIAGNOSIS — E08.621 DIABETIC ULCER OF MIDFOOT ASSOCIATED WITH DIABETES MELLITUS DUE TO UNDERLYING CONDITION, WITH FAT LAYER EXPOSED, UNSPECIFIED LATERALITY (HCC): Primary | ICD-10-CM

## 2020-03-27 DIAGNOSIS — E11.621 DIABETIC ULCER OF LEFT MIDFOOT ASSOCIATED WITH TYPE 2 DIABETES MELLITUS, WITH NECROSIS OF MUSCLE (HCC): ICD-10-CM

## 2020-03-27 DIAGNOSIS — L97.402 DIABETIC ULCER OF MIDFOOT ASSOCIATED WITH DIABETES MELLITUS DUE TO UNDERLYING CONDITION, WITH FAT LAYER EXPOSED, UNSPECIFIED LATERALITY (HCC): Primary | ICD-10-CM

## 2020-03-27 DIAGNOSIS — E11.621 TYPE 2 DIABETES MELLITUS WITH FOOT ULCER, UNSPECIFIED WHETHER LONG TERM INSULIN USE (HCC): ICD-10-CM

## 2020-03-27 LAB
ALBUMIN SERPL-MCNC: 3.2 G/DL (ref 3.4–5)
ALBUMIN/GLOB SERPL: 0.7 {RATIO} (ref 1–2)
ALP LIVER SERPL-CCNC: 104 U/L (ref 50–130)
ALT SERPL-CCNC: 18 U/L (ref 13–56)
ANION GAP SERPL CALC-SCNC: 5 MMOL/L (ref 0–18)
AST SERPL-CCNC: 19 U/L (ref 15–37)
BASOPHILS # BLD AUTO: 0.03 X10(3) UL (ref 0–0.2)
BASOPHILS NFR BLD AUTO: 0.3 %
BILIRUB SERPL-MCNC: 0.2 MG/DL (ref 0.1–2)
BUN BLD-MCNC: 22 MG/DL (ref 7–18)
BUN/CREAT SERPL: 17.2 (ref 10–20)
CALCIUM BLD-MCNC: 9.4 MG/DL (ref 8.5–10.1)
CHLORIDE SERPL-SCNC: 108 MMOL/L (ref 98–112)
CO2 SERPL-SCNC: 25 MMOL/L (ref 21–32)
CREAT BLD-MCNC: 1.28 MG/DL (ref 0.55–1.02)
CRP SERPL-MCNC: 0.7 MG/DL (ref ?–0.3)
DEPRECATED RDW RBC AUTO: 48.4 FL (ref 35.1–46.3)
EOSINOPHIL # BLD AUTO: 0.23 X10(3) UL (ref 0–0.7)
EOSINOPHIL NFR BLD AUTO: 2.1 %
ERYTHROCYTE [DISTWIDTH] IN BLOOD BY AUTOMATED COUNT: 15.9 % (ref 11–15)
ERYTHROCYTE [SEDIMENTATION RATE] IN BLOOD: 80 MM/HR (ref 0–30)
EST. AVERAGE GLUCOSE BLD GHB EST-MCNC: 214 MG/DL (ref 68–126)
GLOBULIN PLAS-MCNC: 4.8 G/DL (ref 2.8–4.4)
GLUCOSE BLD-MCNC: 92 MG/DL (ref 70–99)
HBA1C MFR BLD HPLC: 9.1 % (ref ?–5.7)
HCT VFR BLD AUTO: 38.5 % (ref 35–48)
HGB BLD-MCNC: 11.9 G/DL (ref 12–16)
IMM GRANULOCYTES # BLD AUTO: 0.04 X10(3) UL (ref 0–1)
IMM GRANULOCYTES NFR BLD: 0.4 %
LYMPHOCYTES # BLD AUTO: 3.38 X10(3) UL (ref 1–4)
LYMPHOCYTES NFR BLD AUTO: 30.6 %
M PROTEIN MFR SERPL ELPH: 8 G/DL (ref 6.4–8.2)
MCH RBC QN AUTO: 25.9 PG (ref 26–34)
MCHC RBC AUTO-ENTMCNC: 30.9 G/DL (ref 31–37)
MCV RBC AUTO: 83.9 FL (ref 80–100)
MONOCYTES # BLD AUTO: 0.81 X10(3) UL (ref 0.1–1)
MONOCYTES NFR BLD AUTO: 7.3 %
NEUTROPHILS # BLD AUTO: 6.54 X10 (3) UL (ref 1.5–7.7)
NEUTROPHILS # BLD AUTO: 6.54 X10(3) UL (ref 1.5–7.7)
NEUTROPHILS NFR BLD AUTO: 59.3 %
OSMOLALITY SERPL CALC.SUM OF ELEC: 289 MOSM/KG (ref 275–295)
PATIENT FASTING Y/N/NP: NO
PLATELET # BLD AUTO: 302 10(3)UL (ref 150–450)
POTASSIUM SERPL-SCNC: 4.9 MMOL/L (ref 3.5–5.1)
RBC # BLD AUTO: 4.59 X10(6)UL (ref 3.8–5.3)
SODIUM SERPL-SCNC: 138 MMOL/L (ref 136–145)
WBC # BLD AUTO: 11 X10(3) UL (ref 4–11)

## 2020-03-27 PROCEDURE — 85025 COMPLETE CBC W/AUTO DIFF WBC: CPT

## 2020-03-27 PROCEDURE — 36415 COLL VENOUS BLD VENIPUNCTURE: CPT

## 2020-03-27 PROCEDURE — 85652 RBC SED RATE AUTOMATED: CPT

## 2020-03-27 PROCEDURE — 80053 COMPREHEN METABOLIC PANEL: CPT

## 2020-03-27 PROCEDURE — 97597 DBRDMT OPN WND 1ST 20 CM/<: CPT

## 2020-03-27 PROCEDURE — 86140 C-REACTIVE PROTEIN: CPT

## 2020-03-27 PROCEDURE — 73630 X-RAY EXAM OF FOOT: CPT | Performed by: NURSE PRACTITIONER

## 2020-03-27 PROCEDURE — 83036 HEMOGLOBIN GLYCOSYLATED A1C: CPT

## 2020-03-27 PROCEDURE — 99214 OFFICE O/P EST MOD 30 MIN: CPT

## 2020-03-27 NOTE — PROGRESS NOTES
Subjective    Chief Complaint  This information was obtained from the patient  The patient is new to the [address] [facility] here for an initial visit for the evaluation and management of non-healing left foot diabetic ulcers.     Allergies  no known allergies This information was obtained from the patient    Complaints and Symptoms  Patient complains of:  Eyes: Blurred Vision, Vision Changes  Integumentary (Hair/Skin/Nails): Itching  Neurological: Loss of Protective Sensation    Patient denies complaints or sym Wound #1 Left, Plantar Foot is a Ordoñez Grade 1 Diabetic Ulcer and has received a status of Not Healed. Initial wound encounter measurements are 1.4cm length x 1.5cm width x 0.5cm depth, with an area of 2.1 sq cm and a volume of 1.05 cubic cm.  No tunneling The periwound skin exhibited: Edema, Dry/Scaly.  The periwound skin did not exhibit: Brawny Induration, Excoriation, Induration, Callus, Crepitus, Fluctuance, Friable, Rash, Moist, Maceration, Atrophie Shell, Cyanosis, Ecchymosis, Erythema, Hemosiderosis, Right Extremity: Edema is not present  Compression Device In Use: No  Calf Measurement 28 cm from heel with right measurement of 36.5 cm  Ankle Measurement 10 cm from heel with right measurement of 22.5 cm  Foot Measurement 10 cm from heel with right measu -biphasic wave sounds, less than 3 sec cap refill indicating adequate circulation to heal this wound. -+4 edema to LLE      Discussed the treatment plan including selective debridement, offloading and total contact cast to assist with wound healing.      O Wound #1 Left, Plantar Foot    Wound Cleansing & Dressings  Clean wound with Normal Saline or Wound Cleanser. Collagen  Silver alginate - zinc oxide to the periwound  Other: - secure with tape  Change dressing two times per week.     Offloading  Other: - t - Assess effectiveness of offloading method every visit. If patient is not using prescribed method, discuss with patient and revise care plan as needed to enhance adherence. Status: Initiated Date: 3/27/2020  - Offload an ulcer in site other than on foot. - Fall Risk Assessment on admission. Repeat whenever changes in mobility occur or whenever a new offloading device is used for patients with leg/foot ulcers or per facility policy.   Status: Initiated Date: 3/27/2020  - Educational Assessment and determine .Wound Treatment Note  Assessed patient’s pain status and effectiveness of pain management plan. Limb cleansed using Betasept and water (1), Soap and water (1)  Cleansed wound and periwound with non-cytotoxic agent.  using Wound Cleanser Spray (1)  Applied Facility: Outpatient  Debridement Performed for Assessment: Wound #1 Left, Plantar Foot  Performed By: Physician Anurag Ibarra, FNP-C  Debridement: Selective  Photos    Time-Out Taken: Yes  Post Debridement Measurements  Length: (cm) 1.4  Width: (cm) 1.5

## 2020-03-30 ENCOUNTER — OFFICE VISIT (OUTPATIENT)
Dept: WOUND CARE | Facility: HOSPITAL | Age: 60
End: 2020-03-30
Attending: NURSE PRACTITIONER
Payer: MEDICAID

## 2020-03-30 DIAGNOSIS — L97.509 TYPE 2 DIABETES MELLITUS WITH FOOT ULCER, UNSPECIFIED WHETHER LONG TERM INSULIN USE (HCC): Primary | ICD-10-CM

## 2020-03-30 DIAGNOSIS — E11.621 TYPE 2 DIABETES MELLITUS WITH FOOT ULCER, UNSPECIFIED WHETHER LONG TERM INSULIN USE (HCC): Primary | ICD-10-CM

## 2020-03-30 DIAGNOSIS — E11.621 DIABETIC ULCER OF LEFT MIDFOOT ASSOCIATED WITH TYPE 2 DIABETES MELLITUS, WITH NECROSIS OF MUSCLE (HCC): ICD-10-CM

## 2020-03-30 DIAGNOSIS — L97.423 DIABETIC ULCER OF LEFT MIDFOOT ASSOCIATED WITH TYPE 2 DIABETES MELLITUS, WITH NECROSIS OF MUSCLE (HCC): ICD-10-CM

## 2020-03-30 PROCEDURE — 29445 APPL RIGID TOT CNTC LEG CAST: CPT

## 2020-04-02 ENCOUNTER — OFFICE VISIT (OUTPATIENT)
Dept: WOUND CARE | Facility: HOSPITAL | Age: 60
End: 2020-04-02
Attending: NURSE PRACTITIONER
Payer: MEDICAID

## 2020-04-02 DIAGNOSIS — L97.509 TYPE 2 DIABETES MELLITUS WITH FOOT ULCER, UNSPECIFIED WHETHER LONG TERM INSULIN USE (HCC): Primary | ICD-10-CM

## 2020-04-02 DIAGNOSIS — E11.621 DIABETIC ULCER OF LEFT MIDFOOT ASSOCIATED WITH TYPE 2 DIABETES MELLITUS, WITH NECROSIS OF MUSCLE (HCC): ICD-10-CM

## 2020-04-02 DIAGNOSIS — L97.423 DIABETIC ULCER OF LEFT MIDFOOT ASSOCIATED WITH TYPE 2 DIABETES MELLITUS, WITH NECROSIS OF MUSCLE (HCC): ICD-10-CM

## 2020-04-02 DIAGNOSIS — E11.621 TYPE 2 DIABETES MELLITUS WITH FOOT ULCER, UNSPECIFIED WHETHER LONG TERM INSULIN USE (HCC): Primary | ICD-10-CM

## 2020-04-02 PROCEDURE — 29445 APPL RIGID TOT CNTC LEG CAST: CPT

## 2020-04-02 NOTE — PROGRESS NOTES
Subjective    Chief Complaint  This information was obtained from the patient  The patient was seen today for follow up and management of difficult to heal non-healing left foot diabetic ulcers.     Allergies  no known allergies    HPI  This information was Allergic/Immunologic: Recurrent Fevers  Psychiatric: Mental Illness, Suicidal        Objective    Wound Assessment(s)  Wound #1 Left, Plantar Foot is a Ordoñez Grade 1 Diabetic Ulcer and has received a status of Not Healed.  Subsequent wound encounter measur Height/Length: 58 in (147.32 cm), Weight: 175 lbs (79.55 kgs), BMI: 36.6, Temperature: 97.8 °F (36.56 °C), Pulse: 78 bpm, Respiratory Rate: 18 breaths/min, Blood Pressure: 166/76 mmHg, Pulse Oximetry: 99 %.     Physical Exam  Constitutional  elevated, asymp Wound #1 (Diabetic Ulcer) is located on the left, plantar foot. A Total Contact Cast procedure was performed for the lower left extremity by SHERI Bautista. Pain control was achieved using N/A.  The procedure was tolerated well with pain level of 0 th Assessed patient’s pain status and effectiveness of pain management plan. Limb cleansed using Betasept and water (1), Soap and water (1)  Cleansed wound and periwound with non-cytotoxic agent.  using Wound Cleanser Spray (1)  Applied topical product to per Post Procedural Pain: 0  Response to Treatment: Procedure was tolerated well  Extremity Location: Lower Left Extremity    Electronic Signature(s)  Signed By: Date:  Elena Lara RN 04/02/2020 3:03:45 PM  Miah VALENTINE-BC 04/02/2020 3:33:11 PM       Ent

## 2020-04-06 ENCOUNTER — OFFICE VISIT (OUTPATIENT)
Dept: WOUND CARE | Facility: HOSPITAL | Age: 60
End: 2020-04-06
Attending: NURSE PRACTITIONER
Payer: MEDICAID

## 2020-04-06 DIAGNOSIS — L97.423 DIABETIC ULCER OF LEFT MIDFOOT ASSOCIATED WITH TYPE 2 DIABETES MELLITUS, WITH NECROSIS OF MUSCLE (HCC): Primary | ICD-10-CM

## 2020-04-06 DIAGNOSIS — E11.621 DIABETIC ULCER OF LEFT MIDFOOT ASSOCIATED WITH TYPE 2 DIABETES MELLITUS, WITH NECROSIS OF MUSCLE (HCC): Primary | ICD-10-CM

## 2020-04-06 PROCEDURE — 29445 APPL RIGID TOT CNTC LEG CAST: CPT

## 2020-04-06 NOTE — PROGRESS NOTES
Subjective    Chief Complaint  This information was obtained from the patient  The patient was seen today for follow up and management of difficult to heal non-healing left foot diabetic ulcers.     Allergies  no known allergies    HPI  This information was Musculoskeletal: Decreased Activity, Muscle Weakness  Integumentary (Hair/Skin/Nails): Hemosiderin Staining  Neurological: Memory Loss  Endocrine: Cold Intolerance, Heat Intolerance  Hematologic/Lymphatic: Swollen Glands  Allergic/Immunologic: Recurrent Fe The periwound skin exhibited: Edema, Dry/Scaly.  The periwound skin did not exhibit: Brawny Induration, Excoriation, Induration, Callus, Crepitus, Fluctuance, Friable, Rash, Moist, Maceration, Atrophie Shell, Cyanosis, Ecchymosis, Erythema, Hemosiderosis, Encouraged glycemic control and leg elevation to reduce edema        Procedures    Wound #1  Wound #1 (Diabetic Ulcer) is located on the left, plantar foot.  A Total Contact Cast procedure was performed for the lower left extremity by SHERI Blackman. Wound #1 (Left, Plantar Foot)  . Wound Treatment Note  Assessed patient’s pain status and effectiveness of pain management plan. Limb cleansed using Betasept and water (1), Soap and water (1)  Cleansed wound and periwound with non-cytotoxic agent.  using Wo Performed By: Physician SHERI Chao  Procedural Pain: 0  Post Procedural Pain: 0  Response to Treatment: Procedure was tolerated well  Extremity Location: Lower Left Extremity    Electronic Signature(s)  Signed By: Curtis Lares 04/06/2020

## 2020-04-13 ENCOUNTER — OFFICE VISIT (OUTPATIENT)
Dept: WOUND CARE | Facility: HOSPITAL | Age: 60
End: 2020-04-13
Attending: NURSE PRACTITIONER
Payer: MEDICAID

## 2020-04-13 DIAGNOSIS — E11.621 DIABETIC ULCER OF LEFT MIDFOOT ASSOCIATED WITH TYPE 2 DIABETES MELLITUS, WITH NECROSIS OF MUSCLE (HCC): Primary | ICD-10-CM

## 2020-04-13 DIAGNOSIS — L97.423 DIABETIC ULCER OF LEFT MIDFOOT ASSOCIATED WITH TYPE 2 DIABETES MELLITUS, WITH NECROSIS OF MUSCLE (HCC): Primary | ICD-10-CM

## 2020-04-13 PROCEDURE — 99214 OFFICE O/P EST MOD 30 MIN: CPT

## 2020-04-20 ENCOUNTER — APPOINTMENT (OUTPATIENT)
Dept: WOUND CARE | Facility: HOSPITAL | Age: 60
End: 2020-04-20
Attending: NURSE PRACTITIONER
Payer: MEDICAID

## 2020-04-27 ENCOUNTER — APPOINTMENT (OUTPATIENT)
Dept: WOUND CARE | Facility: HOSPITAL | Age: 60
End: 2020-04-27
Attending: NURSE PRACTITIONER
Payer: MEDICAID

## 2020-05-04 ENCOUNTER — APPOINTMENT (OUTPATIENT)
Dept: WOUND CARE | Facility: HOSPITAL | Age: 60
End: 2020-05-04
Attending: NURSE PRACTITIONER
Payer: MEDICAID

## 2020-09-04 ENCOUNTER — HOSPITAL ENCOUNTER (INPATIENT)
Age: 60
LOS: 1 days | Discharge: HOME | DRG: 243 | End: 2020-09-06
Attending: EMERGENCY MEDICINE | Admitting: INTERNAL MEDICINE

## 2020-09-04 DIAGNOSIS — E87.1 HYPONATREMIA: Primary | ICD-10-CM

## 2020-09-04 PROCEDURE — 99285 EMERGENCY DEPT VISIT HI MDM: CPT

## 2020-09-04 PROCEDURE — 99285 EMERGENCY DEPT VISIT HI MDM: CPT | Performed by: EMERGENCY MEDICINE

## 2020-09-04 PROCEDURE — 80053 COMPREHEN METABOLIC PANEL: CPT

## 2020-09-04 PROCEDURE — 96360 HYDRATION IV INFUSION INIT: CPT

## 2020-09-04 PROCEDURE — 84484 ASSAY OF TROPONIN QUANT: CPT

## 2020-09-04 PROCEDURE — 93005 ELECTROCARDIOGRAM TRACING: CPT | Performed by: EMERGENCY MEDICINE

## 2020-09-04 PROCEDURE — 85025 COMPLETE CBC W/AUTO DIFF WBC: CPT

## 2020-09-04 PROCEDURE — C9803 HOPD COVID-19 SPEC COLLECT: HCPCS

## 2020-09-04 ASSESSMENT — PAIN DESCRIPTION - PAIN TYPE: TYPE: ACUTE PAIN

## 2020-09-04 ASSESSMENT — PAIN SCALES - GENERAL: PAINLEVEL_OUTOF10: 7

## 2020-09-05 LAB
ALBUMIN SERPL-MCNC: 3.5 G/DL (ref 3.6–5.1)
ALBUMIN/GLOB SERPL: 0.8 {RATIO} (ref 1–2.4)
ALP SERPL-CCNC: 86 UNITS/L (ref 45–117)
ALT SERPL-CCNC: 16 UNITS/L
ANION GAP SERPL CALC-SCNC: 10 MMOL/L (ref 10–20)
ANION GAP SERPL CALC-SCNC: 10 MMOL/L (ref 10–20)
AST SERPL-CCNC: 20 UNITS/L
ATRIAL RATE (BPM): 79
BASOPHILS # BLD: 0 K/MCL (ref 0–0.3)
BASOPHILS NFR BLD: 0 %
BILIRUB SERPL-MCNC: 0.5 MG/DL (ref 0.2–1)
BUN SERPL-MCNC: 23 MG/DL (ref 6–20)
BUN SERPL-MCNC: 27 MG/DL (ref 6–20)
BUN/CREAT SERPL: 20 (ref 7–25)
BUN/CREAT SERPL: 22 (ref 7–25)
CALCIUM SERPL-MCNC: 9.5 MG/DL (ref 8.4–10.2)
CALCIUM SERPL-MCNC: 9.8 MG/DL (ref 8.4–10.2)
CHLORIDE SERPL-SCNC: 88 MMOL/L (ref 98–107)
CHLORIDE SERPL-SCNC: 96 MMOL/L (ref 98–107)
CO2 SERPL-SCNC: 30 MMOL/L (ref 21–32)
CO2 SERPL-SCNC: 30 MMOL/L (ref 21–32)
CREAT SERPL-MCNC: 1.13 MG/DL (ref 0.51–0.95)
CREAT SERPL-MCNC: 1.22 MG/DL (ref 0.51–0.95)
DIFFERENTIAL METHOD BLD: ABNORMAL
EOSINOPHIL # BLD: 0 K/MCL (ref 0.1–0.5)
EOSINOPHIL NFR BLD: 0 %
ERYTHROCYTE [DISTWIDTH] IN BLOOD: 13.1 % (ref 11–15)
GLOBULIN SER-MCNC: 4.6 G/DL (ref 2–4)
GLUCOSE BLDC GLUCOMTR-MCNC: 146 MG/DL (ref 70–99)
GLUCOSE BLDC GLUCOMTR-MCNC: 231 MG/DL (ref 70–99)
GLUCOSE BLDC GLUCOMTR-MCNC: 319 MG/DL (ref 70–99)
GLUCOSE BLDC GLUCOMTR-MCNC: 356 MG/DL (ref 70–99)
GLUCOSE SERPL-MCNC: 140 MG/DL (ref 65–99)
GLUCOSE SERPL-MCNC: 174 MG/DL (ref 65–99)
HCT VFR BLD CALC: 33.9 % (ref 36–46.5)
HGB BLD-MCNC: 11.2 G/DL (ref 12–15.5)
IMM GRANULOCYTES # BLD AUTO: 0 K/MCL (ref 0–0.2)
IMM GRANULOCYTES NFR BLD: 0 %
LYMPHOCYTES # BLD: 3.6 K/MCL (ref 1–4)
LYMPHOCYTES NFR BLD: 33 %
MCH RBC QN AUTO: 27.1 PG (ref 26–34)
MCHC RBC AUTO-ENTMCNC: 33 G/DL (ref 32–36.5)
MCV RBC AUTO: 82.1 FL (ref 78–100)
MONOCYTES # BLD: 1 K/MCL (ref 0.3–0.9)
MONOCYTES NFR BLD: 9 %
NEUTROPHILS # BLD: 6.1 K/MCL (ref 1.8–7.7)
NEUTROPHILS NFR BLD: 58 %
NRBC BLD MANUAL-RTO: 0 /100 WBC
P AXIS (DEGREES): 16
PLATELET # BLD: 339 K/MCL (ref 140–450)
POTASSIUM SERPL-SCNC: 3.5 MMOL/L (ref 3.4–5.1)
POTASSIUM SERPL-SCNC: 3.6 MMOL/L (ref 3.4–5.1)
PR-INTERVAL (MSEC): 211
PROT SERPL-MCNC: 8.1 G/DL (ref 6.4–8.2)
QRS-INTERVAL (MSEC): 119
QT-INTERVAL (MSEC): 403
QTC: 460
R AXIS (DEGREES): 10
RBC # BLD: 4.13 MIL/MCL (ref 4–5.2)
REPORT TEXT: NORMAL
SODIUM SERPL-SCNC: 125 MMOL/L (ref 135–145)
SODIUM SERPL-SCNC: 132 MMOL/L (ref 135–145)
T AXIS (DEGREES): 112
TROPONIN I SERPL HS-MCNC: <0.02 NG/ML
VENTRICULAR RATE EKG/MIN (BPM): 78
WBC # BLD: 10.9 K/MCL (ref 4.2–11)

## 2020-09-05 PROCEDURE — 87635 SARS-COV-2 COVID-19 AMP PRB: CPT

## 2020-09-05 PROCEDURE — 10000002 HB ROOM CHARGE MED SURG

## 2020-09-05 PROCEDURE — 10002800 HB RX 250 W HCPCS: Performed by: INTERNAL MEDICINE

## 2020-09-05 PROCEDURE — 80048 BASIC METABOLIC PNL TOTAL CA: CPT

## 2020-09-05 PROCEDURE — 10002803 HB RX 637: Performed by: INTERNAL MEDICINE

## 2020-09-05 PROCEDURE — 10002800 HB RX 250 W HCPCS: Performed by: NURSE PRACTITIONER

## 2020-09-05 PROCEDURE — 10002807 HB RX 258: Performed by: EMERGENCY MEDICINE

## 2020-09-05 PROCEDURE — 10004651 HB RX, NO CHARGE ITEM: Performed by: NURSE PRACTITIONER

## 2020-09-05 PROCEDURE — 36415 COLL VENOUS BLD VENIPUNCTURE: CPT

## 2020-09-05 PROCEDURE — 10002807 HB RX 258: Performed by: NURSE PRACTITIONER

## 2020-09-05 RX ORDER — CYANOCOBALAMIN 1000 UG/ML
10000 INJECTION, SOLUTION INTRAMUSCULAR; SUBCUTANEOUS
COMMUNITY
Start: 2020-06-27

## 2020-09-05 RX ORDER — DEXTROSE MONOHYDRATE 25 G/50ML
12.5 INJECTION, SOLUTION INTRAVENOUS PRN
Status: DISCONTINUED | OUTPATIENT
Start: 2020-09-05 | End: 2020-09-06 | Stop reason: HOSPADM

## 2020-09-05 RX ORDER — ONDANSETRON 2 MG/ML
4 INJECTION INTRAMUSCULAR; INTRAVENOUS EVERY 6 HOURS PRN
Status: DISCONTINUED | OUTPATIENT
Start: 2020-09-05 | End: 2020-09-06 | Stop reason: HOSPADM

## 2020-09-05 RX ORDER — HEPARIN SODIUM 5000 [USP'U]/ML
5000 INJECTION, SOLUTION INTRAVENOUS; SUBCUTANEOUS EVERY 8 HOURS SCHEDULED
Status: DISCONTINUED | OUTPATIENT
Start: 2020-09-05 | End: 2020-09-06 | Stop reason: HOSPADM

## 2020-09-05 RX ORDER — HYDRALAZINE HYDROCHLORIDE 50 MG/1
25 TABLET, FILM COATED ORAL 2 TIMES DAILY
COMMUNITY
Start: 2020-09-01 | End: 2022-06-24 | Stop reason: ALTCHOICE

## 2020-09-05 RX ORDER — AMLODIPINE BESYLATE 5 MG/1
10 TABLET ORAL DAILY
COMMUNITY
End: 2022-06-24 | Stop reason: DRUGHIGH

## 2020-09-05 RX ORDER — ISOSORBIDE MONONITRATE 30 MG/1
30 TABLET, EXTENDED RELEASE ORAL DAILY
Status: DISCONTINUED | OUTPATIENT
Start: 2020-09-06 | End: 2020-09-06 | Stop reason: HOSPADM

## 2020-09-05 RX ORDER — CARVEDILOL 25 MG/1
25 TABLET ORAL 2 TIMES DAILY
COMMUNITY
Start: 2020-09-01

## 2020-09-05 RX ORDER — FUROSEMIDE 80 MG
80 TABLET ORAL DAILY
COMMUNITY
Start: 2020-08-07 | End: 2022-06-24 | Stop reason: SDUPTHER

## 2020-09-05 RX ORDER — FAMOTIDINE 20 MG/1
20 TABLET, FILM COATED ORAL DAILY
Status: DISCONTINUED | OUTPATIENT
Start: 2020-09-06 | End: 2020-09-06 | Stop reason: HOSPADM

## 2020-09-05 RX ORDER — ATORVASTATIN CALCIUM 20 MG/1
20 TABLET, FILM COATED ORAL DAILY
COMMUNITY
Start: 2020-09-01 | End: 2022-08-05 | Stop reason: SDUPTHER

## 2020-09-05 RX ORDER — NICOTINE POLACRILEX 4 MG
30 LOZENGE BUCCAL PRN
Status: DISCONTINUED | OUTPATIENT
Start: 2020-09-05 | End: 2020-09-06 | Stop reason: HOSPADM

## 2020-09-05 RX ORDER — AMLODIPINE BESYLATE 5 MG/1
5 TABLET ORAL DAILY
Status: DISCONTINUED | OUTPATIENT
Start: 2020-09-06 | End: 2020-09-06 | Stop reason: HOSPADM

## 2020-09-05 RX ORDER — ISOSORBIDE MONONITRATE 30 MG/1
30 TABLET, EXTENDED RELEASE ORAL DAILY
COMMUNITY
Start: 2020-06-18 | End: 2022-03-29

## 2020-09-05 RX ORDER — FAMOTIDINE 20 MG/1
20 TABLET, FILM COATED ORAL 2 TIMES DAILY
COMMUNITY
Start: 2020-06-18 | End: 2022-11-09 | Stop reason: ALTCHOICE

## 2020-09-05 RX ORDER — ONDANSETRON 4 MG/1
4 TABLET, FILM COATED ORAL EVERY 6 HOURS PRN
COMMUNITY
Start: 2020-09-01 | End: 2022-03-29

## 2020-09-05 RX ORDER — 0.9 % SODIUM CHLORIDE 0.9 %
2 VIAL (ML) INJECTION EVERY 12 HOURS SCHEDULED
Status: DISCONTINUED | OUTPATIENT
Start: 2020-09-05 | End: 2020-09-06 | Stop reason: HOSPADM

## 2020-09-05 RX ORDER — DEXTROSE MONOHYDRATE 50 MG/ML
INJECTION, SOLUTION INTRAVENOUS CONTINUOUS PRN
Status: DISCONTINUED | OUTPATIENT
Start: 2020-09-05 | End: 2020-09-06 | Stop reason: HOSPADM

## 2020-09-05 RX ORDER — POTASSIUM CHLORIDE 1500 MG/1
40 TABLET, EXTENDED RELEASE ORAL DAILY
COMMUNITY
End: 2022-03-29

## 2020-09-05 RX ORDER — SODIUM CHLORIDE 9 MG/ML
INJECTION, SOLUTION INTRAVENOUS CONTINUOUS
Status: DISCONTINUED | OUTPATIENT
Start: 2020-09-05 | End: 2020-09-06

## 2020-09-05 RX ORDER — POTASSIUM CHLORIDE 20 MEQ/1
40 TABLET, EXTENDED RELEASE ORAL
Status: DISCONTINUED | OUTPATIENT
Start: 2020-09-06 | End: 2020-09-06 | Stop reason: HOSPADM

## 2020-09-05 RX ORDER — NICOTINE POLACRILEX 4 MG
15 LOZENGE BUCCAL PRN
Status: DISCONTINUED | OUTPATIENT
Start: 2020-09-05 | End: 2020-09-06 | Stop reason: HOSPADM

## 2020-09-05 RX ORDER — DEXTROSE MONOHYDRATE 25 G/50ML
25 INJECTION, SOLUTION INTRAVENOUS PRN
Status: DISCONTINUED | OUTPATIENT
Start: 2020-09-05 | End: 2020-09-06 | Stop reason: HOSPADM

## 2020-09-05 RX ORDER — POTASSIUM CHLORIDE 1500 MG/1
40 TABLET, EXTENDED RELEASE ORAL DAILY
Status: DISCONTINUED | OUTPATIENT
Start: 2020-09-05 | End: 2020-09-05 | Stop reason: ALTCHOICE

## 2020-09-05 RX ORDER — CARVEDILOL 25 MG/1
25 TABLET ORAL 2 TIMES DAILY
Status: DISCONTINUED | OUTPATIENT
Start: 2020-09-05 | End: 2020-09-06 | Stop reason: HOSPADM

## 2020-09-05 RX ORDER — GLIPIZIDE 10 MG/1
10 TABLET ORAL AT BEDTIME
COMMUNITY
Start: 2020-09-01 | End: 2023-03-10

## 2020-09-05 RX ADMIN — INSULIN LISPRO 4 UNITS: 100 INJECTION, SOLUTION INTRAVENOUS; SUBCUTANEOUS at 19:06

## 2020-09-05 RX ADMIN — HEPARIN SODIUM 5000 UNITS: 5000 INJECTION INTRAVENOUS; SUBCUTANEOUS at 18:28

## 2020-09-05 RX ADMIN — HEPARIN SODIUM 5000 UNITS: 5000 INJECTION INTRAVENOUS; SUBCUTANEOUS at 21:39

## 2020-09-05 RX ADMIN — CARVEDILOL 25 MG: 25 TABLET, FILM COATED ORAL at 21:39

## 2020-09-05 RX ADMIN — INSULIN LISPRO 4 UNITS: 100 INJECTION, SOLUTION INTRAVENOUS; SUBCUTANEOUS at 21:39

## 2020-09-05 RX ADMIN — SODIUM CHLORIDE, PRESERVATIVE FREE 2 ML: 5 INJECTION INTRAVENOUS at 21:40

## 2020-09-05 RX ADMIN — SODIUM CHLORIDE 500 ML: 0.9 INJECTION, SOLUTION INTRAVENOUS at 01:35

## 2020-09-05 RX ADMIN — HEPARIN SODIUM 5000 UNITS: 5000 INJECTION INTRAVENOUS; SUBCUTANEOUS at 10:01

## 2020-09-05 RX ADMIN — SODIUM CHLORIDE: 0.9 INJECTION, SOLUTION INTRAVENOUS at 06:57

## 2020-09-05 ASSESSMENT — COGNITIVE AND FUNCTIONAL STATUS - GENERAL
BECAUSE OF A PHYSICAL, MENTAL, OR EMOTIONAL CONDITION, DO YOU HAVE SERIOUS DIFFICULTY CONCENTRATING, REMEMBERING OR MAKING DECISIONS: NO
DO YOU HAVE SERIOUS DIFFICULTY WALKING OR CLIMBING STAIRS: NO
BECAUSE OF A PHYSICAL, MENTAL, OR EMOTIONAL CONDITION, DO YOU HAVE DIFFICULTY DOING ERRANDS ALONE: NO
DO YOU HAVE DIFFICULTY DRESSING OR BATHING: NO
ARE YOU BLIND OR DO YOU HAVE SERIOUS DIFFICULTY SEEING, EVEN WHEN WEARING GLASSES: NO
ARE YOU DEAF OR DO YOU HAVE SERIOUS DIFFICULTY  HEARING: NO

## 2020-09-05 ASSESSMENT — LIFESTYLE VARIABLES
ALCOHOL_USE_STATUS: NO OR LOW RISK WITH VALIDATED TOOL
HOW MANY STANDARD DRINKS CONTAINING ALCOHOL DO YOU HAVE ON A TYPICAL DAY: 0,1 OR 2
HOW OFTEN DO YOU HAVE A DRINK CONTAINING ALCOHOL: NEVER
AUDIT-C TOTAL SCORE: 0
HOW OFTEN DO YOU HAVE 6 OR MORE DRINKS ON ONE OCCASION: NEVER

## 2020-09-05 ASSESSMENT — ACTIVITIES OF DAILY LIVING (ADL)
ADL_SCORE: 12
ADL_BEFORE_ADMISSION: INDEPENDENT
CHRONIC_PAIN_PRESENT: NO
RECENT_DECLINE_ADL: NO
ADL_SHORT_OF_BREATH: NO

## 2020-09-05 ASSESSMENT — PAIN SCALES - GENERAL
PAINLEVEL_OUTOF10: 0

## 2020-09-06 ENCOUNTER — APPOINTMENT (OUTPATIENT)
Dept: GENERAL RADIOLOGY | Age: 60
DRG: 243 | End: 2020-09-06
Attending: EMERGENCY MEDICINE

## 2020-09-06 VITALS
DIASTOLIC BLOOD PRESSURE: 61 MMHG | HEART RATE: 75 BPM | OXYGEN SATURATION: 99 % | WEIGHT: 153 LBS | TEMPERATURE: 98.8 F | SYSTOLIC BLOOD PRESSURE: 101 MMHG | HEIGHT: 59 IN | BODY MASS INDEX: 30.84 KG/M2 | RESPIRATION RATE: 16 BRPM

## 2020-09-06 LAB
ALBUMIN SERPL-MCNC: 2.9 G/DL (ref 3.6–5.1)
ALBUMIN/GLOB SERPL: 0.7 {RATIO} (ref 1–2.4)
ALP SERPL-CCNC: 84 UNITS/L (ref 45–117)
ALT SERPL-CCNC: 14 UNITS/L
ANION GAP SERPL CALC-SCNC: 6 MMOL/L (ref 10–20)
AST SERPL-CCNC: 13 UNITS/L
BASOPHILS # BLD: 0.1 K/MCL (ref 0–0.3)
BASOPHILS NFR BLD: 1 %
BILIRUB SERPL-MCNC: 0.3 MG/DL (ref 0.2–1)
BUN SERPL-MCNC: 17 MG/DL (ref 6–20)
BUN/CREAT SERPL: 16 (ref 7–25)
CALCIUM SERPL-MCNC: 8.8 MG/DL (ref 8.4–10.2)
CHLORIDE SERPL-SCNC: 104 MMOL/L (ref 98–107)
CO2 SERPL-SCNC: 29 MMOL/L (ref 21–32)
CREAT SERPL-MCNC: 1.06 MG/DL (ref 0.51–0.95)
DIFFERENTIAL METHOD BLD: ABNORMAL
EOSINOPHIL # BLD: 0.1 K/MCL (ref 0.1–0.5)
EOSINOPHIL NFR BLD: 1 %
ERYTHROCYTE [DISTWIDTH] IN BLOOD: 13.3 % (ref 11–15)
GLOBULIN SER-MCNC: 4 G/DL (ref 2–4)
GLUCOSE BLDC GLUCOMTR-MCNC: 162 MG/DL (ref 70–99)
GLUCOSE BLDC GLUCOMTR-MCNC: 220 MG/DL (ref 70–99)
GLUCOSE SERPL-MCNC: 219 MG/DL (ref 65–99)
HCT VFR BLD CALC: 32.9 % (ref 36–46.5)
HGB BLD-MCNC: 10.7 G/DL (ref 12–15.5)
IMM GRANULOCYTES # BLD AUTO: 0 K/MCL (ref 0–0.2)
IMM GRANULOCYTES NFR BLD: 0 %
LYMPHOCYTES # BLD: 3.8 K/MCL (ref 1–4)
LYMPHOCYTES NFR BLD: 45 %
MAGNESIUM SERPL-MCNC: 2.1 MG/DL (ref 1.7–2.4)
MCH RBC QN AUTO: 27.6 PG (ref 26–34)
MCHC RBC AUTO-ENTMCNC: 32.5 G/DL (ref 32–36.5)
MCV RBC AUTO: 85 FL (ref 78–100)
MONOCYTES # BLD: 0.7 K/MCL (ref 0.3–0.9)
MONOCYTES NFR BLD: 9 %
NEUTROPHILS # BLD: 3.8 K/MCL (ref 1.8–7.7)
NEUTROPHILS NFR BLD: 44 %
NRBC BLD MANUAL-RTO: 0 /100 WBC
PHOSPHATE SERPL-MCNC: 3 MG/DL (ref 2.4–4.7)
PLATELET # BLD: 300 K/MCL (ref 140–450)
POTASSIUM SERPL-SCNC: 3.4 MMOL/L (ref 3.4–5.1)
PROT SERPL-MCNC: 6.9 G/DL (ref 6.4–8.2)
RBC # BLD: 3.87 MIL/MCL (ref 4–5.2)
SODIUM SERPL-SCNC: 136 MMOL/L (ref 135–145)
WBC # BLD: 8.4 K/MCL (ref 4.2–11)

## 2020-09-06 PROCEDURE — 10002800 HB RX 250 W HCPCS: Performed by: NURSE PRACTITIONER

## 2020-09-06 PROCEDURE — 10002800 HB RX 250 W HCPCS: Performed by: INTERNAL MEDICINE

## 2020-09-06 PROCEDURE — 10004651 HB RX, NO CHARGE ITEM: Performed by: INTERNAL MEDICINE

## 2020-09-06 PROCEDURE — 84100 ASSAY OF PHOSPHORUS: CPT

## 2020-09-06 PROCEDURE — 10002803 HB RX 637: Performed by: INTERNAL MEDICINE

## 2020-09-06 PROCEDURE — 71045 X-RAY EXAM CHEST 1 VIEW: CPT

## 2020-09-06 PROCEDURE — 83735 ASSAY OF MAGNESIUM: CPT

## 2020-09-06 PROCEDURE — 80053 COMPREHEN METABOLIC PANEL: CPT

## 2020-09-06 PROCEDURE — 85025 COMPLETE CBC W/AUTO DIFF WBC: CPT

## 2020-09-06 PROCEDURE — 36415 COLL VENOUS BLD VENIPUNCTURE: CPT

## 2020-09-06 RX ADMIN — POTASSIUM CHLORIDE 40 MEQ: 1500 TABLET, EXTENDED RELEASE ORAL at 09:50

## 2020-09-06 RX ADMIN — FAMOTIDINE 20 MG: 20 TABLET ORAL at 09:50

## 2020-09-06 RX ADMIN — ISOSORBIDE MONONITRATE 30 MG: 30 TABLET, EXTENDED RELEASE ORAL at 09:50

## 2020-09-06 RX ADMIN — INSULIN LISPRO 2 UNITS: 100 INJECTION, SOLUTION INTRAVENOUS; SUBCUTANEOUS at 09:49

## 2020-09-06 RX ADMIN — HEPARIN SODIUM 5000 UNITS: 5000 INJECTION INTRAVENOUS; SUBCUTANEOUS at 05:51

## 2020-09-06 RX ADMIN — ASPIRIN 81 MG: 81 TABLET, COATED ORAL at 09:49

## 2020-09-06 RX ADMIN — CARVEDILOL 25 MG: 25 TABLET, FILM COATED ORAL at 09:49

## 2020-09-06 RX ADMIN — AMLODIPINE BESYLATE 5 MG: 5 TABLET ORAL at 09:50

## 2020-09-06 RX ADMIN — ONDANSETRON 4 MG: 2 INJECTION INTRAMUSCULAR; INTRAVENOUS at 02:09

## 2020-09-06 RX ADMIN — INSULIN LISPRO 1 UNITS: 100 INJECTION, SOLUTION INTRAVENOUS; SUBCUTANEOUS at 13:57

## 2020-09-06 ASSESSMENT — PAIN SCALES - GENERAL: PAINLEVEL_OUTOF10: 0

## 2020-09-08 LAB
SARS-COV-2 RNA RESP QL NAA+PROBE: NOT DETECTED
SPECIMEN SOURCE: NORMAL

## 2020-11-04 ENCOUNTER — HOSPITAL ENCOUNTER (INPATIENT)
Facility: HOSPITAL | Age: 60
LOS: 2 days | Discharge: HOME HEALTH | DRG: 603 | End: 2020-11-07
Attending: EMERGENCY MEDICINE | Admitting: HOSPITALIST
Payer: MEDICAID

## 2020-11-04 ENCOUNTER — APPOINTMENT (OUTPATIENT)
Dept: ULTRASOUND IMAGING | Age: 60
DRG: 603 | End: 2020-11-04
Attending: EMERGENCY MEDICINE
Payer: MEDICAID

## 2020-11-04 ENCOUNTER — APPOINTMENT (OUTPATIENT)
Dept: GENERAL RADIOLOGY | Age: 60
DRG: 603 | End: 2020-11-04
Attending: EMERGENCY MEDICINE
Payer: MEDICAID

## 2020-11-04 DIAGNOSIS — R50.9 FEVER, UNSPECIFIED FEVER CAUSE: ICD-10-CM

## 2020-11-04 DIAGNOSIS — D72.829 LEUKOCYTOSIS, UNSPECIFIED TYPE: Primary | ICD-10-CM

## 2020-11-04 DIAGNOSIS — R07.9 CHEST PAIN OF UNCERTAIN ETIOLOGY: ICD-10-CM

## 2020-11-04 PROCEDURE — 71045 X-RAY EXAM CHEST 1 VIEW: CPT | Performed by: EMERGENCY MEDICINE

## 2020-11-04 PROCEDURE — 06HM33Z INSERTION OF INFUSION DEVICE INTO RIGHT FEMORAL VEIN, PERCUTANEOUS APPROACH: ICD-10-PCS | Performed by: EMERGENCY MEDICINE

## 2020-11-04 PROCEDURE — 93971 EXTREMITY STUDY: CPT | Performed by: EMERGENCY MEDICINE

## 2020-11-04 RX ORDER — ONDANSETRON 2 MG/ML
4 INJECTION INTRAMUSCULAR; INTRAVENOUS ONCE
Status: COMPLETED | OUTPATIENT
Start: 2020-11-04 | End: 2020-11-04

## 2020-11-04 RX ORDER — HYDROCODONE BITARTRATE AND ACETAMINOPHEN 5; 325 MG/1; MG/1
1 TABLET ORAL EVERY 6 HOURS PRN
COMMUNITY
End: 2021-04-15

## 2020-11-04 RX ORDER — SODIUM CHLORIDE 9 MG/ML
1000 INJECTION, SOLUTION INTRAVENOUS CONTINUOUS
Status: ACTIVE | OUTPATIENT
Start: 2020-11-04 | End: 2020-11-05

## 2020-11-04 RX ORDER — GLIPIZIDE 10 MG/1
10 TABLET ORAL EVERY MORNING
COMMUNITY

## 2020-11-04 RX ORDER — CIPROFLOXACIN 500 MG/1
500 TABLET, FILM COATED ORAL 2 TIMES DAILY
Status: ON HOLD | COMMUNITY
End: 2020-11-07

## 2020-11-04 RX ORDER — ACETAMINOPHEN 500 MG
1000 TABLET ORAL ONCE
Status: COMPLETED | OUTPATIENT
Start: 2020-11-04 | End: 2020-11-04

## 2020-11-05 ENCOUNTER — APPOINTMENT (OUTPATIENT)
Dept: GENERAL RADIOLOGY | Facility: HOSPITAL | Age: 60
DRG: 603 | End: 2020-11-05
Attending: INTERNAL MEDICINE
Payer: MEDICAID

## 2020-11-05 ENCOUNTER — APPOINTMENT (OUTPATIENT)
Dept: MRI IMAGING | Facility: HOSPITAL | Age: 60
DRG: 603 | End: 2020-11-05
Attending: PODIATRIST
Payer: MEDICAID

## 2020-11-05 ENCOUNTER — APPOINTMENT (OUTPATIENT)
Dept: GENERAL RADIOLOGY | Facility: HOSPITAL | Age: 60
DRG: 603 | End: 2020-11-05
Attending: PODIATRIST
Payer: MEDICAID

## 2020-11-05 PROBLEM — R07.9 CHEST PAIN OF UNCERTAIN ETIOLOGY: Status: ACTIVE | Noted: 2020-11-05

## 2020-11-05 PROBLEM — I10 BENIGN ESSENTIAL HTN: Status: ACTIVE | Noted: 2019-12-11

## 2020-11-05 PROBLEM — D72.829 LEUKOCYTOSIS, UNSPECIFIED TYPE: Status: ACTIVE | Noted: 2020-11-05

## 2020-11-05 PROBLEM — E78.5 DYSLIPIDEMIA: Status: ACTIVE | Noted: 2019-12-11

## 2020-11-05 PROBLEM — R50.9 FEVER, UNSPECIFIED FEVER CAUSE: Status: ACTIVE | Noted: 2020-11-05

## 2020-11-05 PROCEDURE — 99223 1ST HOSP IP/OBS HIGH 75: CPT | Performed by: INTERNAL MEDICINE

## 2020-11-05 PROCEDURE — 99253 IP/OBS CNSLTJ NEW/EST LOW 45: CPT | Performed by: PODIATRIST

## 2020-11-05 PROCEDURE — 73718 MRI LOWER EXTREMITY W/O DYE: CPT | Performed by: PODIATRIST

## 2020-11-05 PROCEDURE — 73721 MRI JNT OF LWR EXTRE W/O DYE: CPT | Performed by: PODIATRIST

## 2020-11-05 PROCEDURE — 73630 X-RAY EXAM OF FOOT: CPT | Performed by: PODIATRIST

## 2020-11-05 RX ORDER — ONDANSETRON 2 MG/ML
4 INJECTION INTRAMUSCULAR; INTRAVENOUS EVERY 6 HOURS PRN
Status: DISCONTINUED | OUTPATIENT
Start: 2020-11-05 | End: 2020-11-08

## 2020-11-05 RX ORDER — FAMOTIDINE 20 MG/1
40 TABLET ORAL DAILY
Status: DISCONTINUED | OUTPATIENT
Start: 2020-11-05 | End: 2020-11-05

## 2020-11-05 RX ORDER — CARVEDILOL 3.12 MG/1
3.12 TABLET ORAL 2 TIMES DAILY WITH MEALS
Status: DISCONTINUED | OUTPATIENT
Start: 2020-11-05 | End: 2020-11-07

## 2020-11-05 RX ORDER — POTASSIUM CHLORIDE 20 MEQ/1
40 TABLET, EXTENDED RELEASE ORAL EVERY 4 HOURS
Status: COMPLETED | OUTPATIENT
Start: 2020-11-05 | End: 2020-11-05

## 2020-11-05 RX ORDER — ASPIRIN 81 MG/1
81 TABLET ORAL DAILY
Status: DISCONTINUED | OUTPATIENT
Start: 2020-11-05 | End: 2020-11-08

## 2020-11-05 RX ORDER — ATORVASTATIN CALCIUM 20 MG/1
20 TABLET, FILM COATED ORAL NIGHTLY
Status: DISCONTINUED | OUTPATIENT
Start: 2020-11-05 | End: 2020-11-08

## 2020-11-05 RX ORDER — DEXTROSE MONOHYDRATE 25 G/50ML
50 INJECTION, SOLUTION INTRAVENOUS
Status: DISCONTINUED | OUTPATIENT
Start: 2020-11-05 | End: 2020-11-08

## 2020-11-05 RX ORDER — CARVEDILOL 12.5 MG/1
25 TABLET ORAL 2 TIMES DAILY WITH MEALS
Status: DISCONTINUED | OUTPATIENT
Start: 2020-11-05 | End: 2020-11-05

## 2020-11-05 RX ORDER — HEPARIN SODIUM 5000 [USP'U]/ML
5000 INJECTION, SOLUTION INTRAVENOUS; SUBCUTANEOUS EVERY 8 HOURS SCHEDULED
Status: DISCONTINUED | OUTPATIENT
Start: 2020-11-05 | End: 2020-11-08

## 2020-11-05 RX ORDER — LOSARTAN POTASSIUM 100 MG/1
100 TABLET ORAL DAILY
Status: DISCONTINUED | OUTPATIENT
Start: 2020-11-05 | End: 2020-11-05

## 2020-11-05 RX ORDER — ACETAMINOPHEN 325 MG/1
650 TABLET ORAL EVERY 6 HOURS PRN
Status: DISCONTINUED | OUTPATIENT
Start: 2020-11-05 | End: 2020-11-08

## 2020-11-05 RX ORDER — ISOSORBIDE MONONITRATE 30 MG/1
30 TABLET, EXTENDED RELEASE ORAL DAILY
Status: DISCONTINUED | OUTPATIENT
Start: 2020-11-05 | End: 2020-11-05

## 2020-11-05 RX ORDER — FUROSEMIDE 80 MG
80 TABLET ORAL DAILY
Status: DISCONTINUED | OUTPATIENT
Start: 2020-11-05 | End: 2020-11-05

## 2020-11-05 NOTE — PLAN OF CARE
Received pt from ED with vomiting, chills, fever, and fatigue. A/o x4, NSR on tele. WNL on RA. Denies any pain or SOB at this time. Pt mainly Portuguese speaking but able to convey simple messages.  Pt has wound to L foot with redness extending into leg, area is

## 2020-11-05 NOTE — ED NOTES
Pt back from ultrasound. Notified pt and pt's son of pt's admission to hospital. Answered all questions. Pt's son states he \"works night shift\" and \"needs to leave to go to work\". Pt's son notified pt of his departure, he stated to this RN.  Per pt's so

## 2020-11-05 NOTE — CONSULTS
INFECTIOUS DISEASE CONSULT NOTE    Sean Valdes Patient Status:  Inpatient    1960 MRN [id number]   [facility] 5NW-A Attending Suyapa Grimes DO   Hosp Day # 0 PCP None Pcp •  influenza vaccine split quad (FLULAVAL) ages 7 months to 59 years inj 0.5ml, 0.5 mL, Intramuscular, Prior to discharge  •  glucose (DEX4) oral liquid 15 g, 15 g, Oral, Q15 Min PRN **OR** Glucose-Vitamin C (DEX-4) chewable tab 4 tablet, 4 tablet, Oral, Q Respiratory: Clear to auscultation bilaterally. No wheezes. No rhonchi. Cardiovascular: S1, S2.  Regular rate and rhythm. No murmurs. Abdomen: Soft, nontender, nondistended. Positive bowel sounds.   Musculoskeletal: Full range of motion of all extremit PROCEDURE:  XR FOOT, COMPLETE (MIN 3 VIEWS), LEFT (CPT=73630)  TECHNIQUE:  AP, oblique, and lateral views were obtained. COMPARISON:  None.   INDICATIONS:  Ulcer left foot  PATIENT STATED HISTORY: (As transcribed by Technologist)  Patient offered no additi PROCEDURE:  US VENOUS DOPPLER LEG LEFT - DIAG IMG (CPT=93971)  COMPARISON:  None. INDICATIONS:  eval for DVT  TECHNIQUE:  Real time, grey scale, and duplex ultrasound was used to evaluate the lower extremity venous system.  B-mode two-dimensional images of CONCLUSION:  Patchy right lower lobe consolidation. Dictated by (CST): Diana Ricks MD on 11/04/2020 at 9:36 PM     Finalized by (CST): Diana Ricks MD on 11/04/2020 at 9:36 PM            ASSESSMENT:  1.  Acute febrile illness- due to LLE roesmarie

## 2020-11-05 NOTE — ED NOTES
Pt has vomited while in ultrasound and when returned to room. RN went to ultrasound and witnessed pt had emesis x 1 of brown, \"[employer]\" per pt's son. Dr. Rome Deaner aware and notified.

## 2020-11-05 NOTE — PROGRESS NOTES
Pt returned from XR and c/o dizziness. SBP in 80s, HR in 70s. Pt alert but drowsy. Hospitalist paged and at bedside. 1L of NS infusing per [facility] orders. VSS. After 1L NS, SBP now in 100s. Pt states relief. Will continue to monitor.     1900 BC results called

## 2020-11-05 NOTE — ED NOTES
Pt's son left pt's prescriptions bottles in a grocery bag for RN stating, Ramesh Byers are her daily medications she takes\" and he left to go to work for the night.  This RN updated pt's home medication list from prescription bottles pt's son left of hers in

## 2020-11-05 NOTE — CM/SW NOTE
11/05/20 1500   CM/SW Screening   Referral Source Physician   Information Source Chart review;Nursing rounds   Patient's Mental Status Alert;Oriented   Patient's [address]   Number of Levels in Home 1   Patient lives with Children   Patient

## 2020-11-05 NOTE — CONSULTS
[facility]  Report of Consultation    Eve Rogers Patient Status:  Inpatient    1960 MRN [id number]   [facility] 5NW-A Attending José Howell, DO   Hosp Day # 0 PCP None Pcp     Date of Admission:  2020  Date of Consult: mL ADD-vantage, 3.375 g, Intravenous, Q8H  •  influenza vaccine split quad (FLULAVAL) ages 6 months to 64 years inj 0.5ml, 0.5 mL, Intramuscular, Prior to discharge  •  glucose (DEX4) oral liquid 15 g, 15 g, Oral, Q15 Min PRN **OR** Glucose-Vitamin C (DEX- 11/05/2020    GLU 54 11/05/2020    CA 9.1 11/05/2020    ALB 3.1 11/04/2020    ALKPHO 87 11/04/2020    BILT 0.4 11/04/2020    TP 8.1 11/04/2020    AST 15 11/04/2020    ALT 12 11/04/2020    ESRML 72 11/05/2020    CRP 10.30 11/05/2020    TROP <0.045 11/04/202

## 2020-11-05 NOTE — CONSULTS
[facility]  Inpatient Wound Care Contact Note    Kiet Justin Patient Status:  Inpatient    1960 MRN [id number]   [facility] 5NW-A Attending Yfn Acharya, DO   Hosp Day # 0 PCP None Pcp     Consult received, attempted to see, P

## 2020-11-05 NOTE — ED NOTES
Before straight catheter performed by this RN, pt was cleaned of her vomit and urine incontinence to perineal region.

## 2020-11-05 NOTE — PROGRESS NOTES
[facility]  Progress Note     Roxana Toribio Patient Status:  Inpatient    1960 MRN [id number]   [facility] 5NW-A Attending Paulo Long,    Hosp Day # 0 PCP None Pcp     Chief Complaint: Fever    S: Patient had hypoglycemic 11/05/20  0530   * 54*   BUN 15 18   CREATSERUM 1.09* 1.04*   GFRAA 63 67   GFRNAA 55* 58*   CA 8.9 9.1   ALB 3.1*  --    * 136   K 3.9 3.4*    101   CO2 26.0 30.0   ALKPHO 87  --    AST 15  --    ALT 12*  --    BILT 0.4  --    TP 8.1  - L IVF. 2. Hold home imdur, losartan and lasix  3. Decrease dose of coreg and pending BP, might discontinue   8. Dyslipidemia  1. statin  9. CAD  1. Continue home meds  10. Anemia, currently at baseline  1.  Check iron studies and if low, give IV iron

## 2020-11-05 NOTE — ED PROVIDER NOTES
Patient Seen in: [facility]      History   Patient presents with:  Nausea/Vomiting/Diarrhea  Dizziness  Fatigue    Stated Complaint: nausea, vomiting, fatigue    HPI    Patient is 24-year-old female comes emergency room for equal round reactive to light and accommodation, extraocular motion is intact, sclerae white, conjunctiva is pink. Oropharynx is unremarkable, no exudate. NECK: Supple, trachea midline, no lymphadenopathy.    LUNG: Lungs clear to auscultation bilaterally, 105  Rhythm: Sinus Rhythm  Reading: no acute changes             Us Venous Doppler Leg Left - Diag Img (cpt=93971)    Result Date: 11/4/2020  PROCEDURE:  US VENOUS DOPPLER LEG LEFT - DIAG IMG (CPT=93971)  COMPARISON:  None.   INDICATIONS:  eval for DVT  RADHA consolidation. Dictated by (CST): Bebeto Schwartz MD on 11/04/2020 at 9:36 PM     Finalized by (CST): Bebeto Schwartz MD on 11/04/2020 at 9:36 PM          Procedure note: Central line placement.   Multiple attempts were made a peripheral line place

## 2020-11-05 NOTE — ED NOTES
This RN, Tyree Romero RN, and Topher Talbot RN attempted IV insertion x 1 and Luis via ultrasound without success. Dr. Jac Ivory notified and aware.  This RN and Dr[facility] Iv[facility] line insertion with regards to the comp

## 2020-11-06 PROCEDURE — 99232 SBSQ HOSP IP/OBS MODERATE 35: CPT | Performed by: PODIATRIST

## 2020-11-06 PROCEDURE — 99232 SBSQ HOSP IP/OBS MODERATE 35: CPT | Performed by: INTERNAL MEDICINE

## 2020-11-06 RX ORDER — POTASSIUM CHLORIDE 20 MEQ/1
40 TABLET, EXTENDED RELEASE ORAL ONCE
Status: DISCONTINUED | OUTPATIENT
Start: 2020-11-06 | End: 2020-11-06

## 2020-11-06 RX ORDER — POTASSIUM CHLORIDE 20 MEQ/1
40 TABLET, EXTENDED RELEASE ORAL ONCE
Status: COMPLETED | OUTPATIENT
Start: 2020-11-06 | End: 2020-11-06

## 2020-11-06 NOTE — PLAN OF CARE
Assumed care of pt around 0715  Pt A&Ox4   Pt speaks Azeri, language line used   NSR on tele      Pt incontinent, purewick in place  Denies any pain at this time   Daily wound care to L foot  Triple lumen picc in R groin, unit draw   IV zosyn   Accu QID

## 2020-11-06 NOTE — CM/SW NOTE
Chart reviewed, home with son    Barrier to DC  MRI of foot pending-may need IVABEX  May need wound care post dc  Residential Landry Pradhan pending

## 2020-11-06 NOTE — PROGRESS NOTES
INFECTIOUS DISEASE PROGRESS NOTE    Alondra Casanova Patient Status:  Inpatient    1960 MRN [id number]   [facility] 5NW-A Attending Vivi Escobar DO   Hosp Day # 1 PCP None Pcp temperature source Oral, resp. rate 18, weight 162 lb (73.5 kg), SpO2 97 %. HEENT: no scleral icterus or conjunctival injection. Moist mucous membranes. No thrush  Neck: No lymphadenopathy. Supple. Respiratory: Clear to auscultation bilaterally.   No whe 11/04/20   1.  BLOOD CULTURE     Status: Abnormal (Preliminary result)    Collection Time: 11/04/20  8:26 PM    Specimen: Blood,peripheral   Result Value Ref Range    Blood Culture Result Staphylococcus species, not aureus (A) N/A    Blood Culture Smear Gra Findings could represent cellulitis and myositis or diffuse hydrostatic edema. The distal Achilles tendon is intact.  Peroneal tendons, anterior ankle tendons and medial a occult tendons are obscured by the artifact described above although grossly [place]

## 2020-11-06 NOTE — PROGRESS NOTES
[facility]                                                            Progress Note    Yohannes Izaguirre Patient Status:  Inpatient    1960 MRN [id number]   [facility] 3NE-A Attending Jose E Echavarria DO   Hosp Day # 1 PCP None Pcp of the cuboid joint. The ulcer does not show any fluctuance or any significant drainage. There is breakdown of the tarsometatarsal region which seems to be consistent with a Charcot joint on physical exam.  This involves the left midfoot complex.   The ri =====  CONCLUSION:  Soft tissue ulceration is present. Hyperdensity in this region may represent topical dressing. Correlation with exam is suggested. No cortical erosion is seen.        Dictated by (CST): Laurie Saxena MD on 11/05/2020 at 11:27 AM edematous signal in all intrinsic muscles in the foot. Findings could represent cellulitis and myositis or diffuse hydrostatic edema. The distal Achilles tendon is intact.   Peroneal tendons, anterior ankle tendons and medial a occult tendons are obscu

## 2020-11-06 NOTE — PHYSICAL THERAPY NOTE
PT orders received for gait training and to obtain an offloading shoe. Called and d/w RN as PT does not obtain offloading shoe. Will await shoe from [place] [phone], to f/u as schedule permits once shoe obtained.

## 2020-11-06 NOTE — HOME CARE LIAISON
Received referral from [place], [place]  . Met with patient at the bedside to discuss home health services and offer choice. Entire conversation took place via  line. Patient is agreeable to [facility] services at discharge.  Also spoke with patient's son who

## 2020-11-06 NOTE — DIETARY NOTE
[address] Galvez     Admitting diagnosis:  Chest pain of uncertain etiology [U85.4]  Fever, unspecified fever cause [R50.9]  Leukocytosis, unspecified type [D72.829]    Ht:  4' 9\"  Wt: 73.5 kg (162 lb).   Body mass ind

## 2020-11-06 NOTE — CONSULTS
[facility]  Report of Inpatient Wound Care Consultation     Jameel Rank Patient Status:  Inpatient    1960 MRN [id number]   [facility] 3NE-A Attending Neeraj Banerjee, DO   Hosp Day # 1 PCP None Pcp     REASON FOR CONSULT:  L f --    CRP  --  10.30*  --   --   --   --   --   --   --   --    PGLU  --   --   --    < >  --    < >  --  68* 107* 143*    < > = values in this interval not displayed.        Imaging:   See EMR  Microbiology:   See EMR    ASSESSMENT/ RECOMMENDATIONS:  Lacey Abarca

## 2020-11-06 NOTE — PROGRESS NOTES
11/05/20 1411   Clinical Encounter Type   Visited With Patient   Routine Visit Introduction   Referral From   (Consult)   Referral To    Adventism Encounters   Adventism Needs Prayer   Patient Spiritual Encounters   Feelings of Loneliness/[place]

## 2020-11-06 NOTE — PROGRESS NOTES
[facility]ITAL[facility]  Progress Note     Melba Plata Patient Status:  Inpatient    1960 MRN [id number]   [facility] 5NW-A Attending Robert Stewart, [address] [facility] Day # 1 PCP None Pcp     Chief Complaint: Fever    S: BP has been stable.  Stat Creatinine Clearance: 70.5 mL/min (based on SCr of 0.96 mg/dL). No results for input(s): PTP, INR in the last 168 hours. Recent Labs   Lab 11/04/20 2026   TROP <0.045        Imaging: Imaging data reviewed in Epic.     Medications:   • iron sucrose  2 baseline  1.  Iron studies low --> given IV iron      Quality:  · DVT Prophylaxis: [facility]  · CODE status: Full  · Reina: No  · Central line: No    Will the patient be referred to TCC on discharge?: No  Estimated date of discharge: TBD  Discharge is dependent o

## 2020-11-06 NOTE — PAYOR COMM NOTE
--------------  ADMISSION REVIEW     Payor: Frankie Liang #:  [id number]  Authorization Number: [id number]    Admit date: 11/5/20  Admit time: 0025       Admitting Physician: Randall Aschoff, MD  Attending Physician: Other systems are as noted in HPI. Constitutional and vital signs reviewed. All other systems reviewed and negative except as noted above.     Physical Exam     ED Triage Vitals [11/04/20 1842]   BP (!) 183/82   Pulse 102   Resp 26   Temp (!) 103 °F ( Procedure                               Abnormality         Status                     ---------                               -----------         ------                     CBC W/ DIFFERENTIAL[[id number]]          Abnormal            Final result CONCLUSION:  No evidence of DVT left lower extremity. Mildly prominent left inguinal lymph nodes are noted.     Dictated by (CST): Thelma Smith MD on 11/04/2020 at 10:56 PM     Finalized by (CST): Thelma Smith MD on 11/04/2020 at 10:57 PM Patient is a 41-year-old female comes emergency room for evaluation of a fever. Likely source cellulitis. Chest x-ray showing pneumonia. IV Zosyn given. Central line placed due to poor IV access as we cannot place a line anywhere else.   Patient has jess History of Present Illness: Sharla Jerry is a 56year old female with PMHx of DM, HTN, DL, CAD presents with N/V and F/C. On arrival to ED pt was noted to be febrile to 103. She has not abd pain or diarrhea. She had continued emesis in ED.  No blood noted •  albuterol sulfate (2.5 MG/3ML) 0.083% Inhalation Nebu Soln, Take by nebulization every 6 (six) hours as needed for Wheezing., Disp: , Rfl:     •  Isosorbide Mononitrate ER 30 MG Oral Tablet 24 Hr, Take 30 mg by mouth daily. , Disp: , Rfl:     •  furosemi Psychiatric: Appropriate mood and affect.       Diagnostic Data:      Labs:  Recent Labs   Lab 11/04/20 2026   WBC 25.5*   HGB 11.3*   MCV 80.7   .0       Recent Labs   Lab 11/04/20 2026   *   BUN 15   CREATSERUM 1.09*   GFRAA 63   GFRNAA 55 Sean Valdes Patient Status:  Inpatient    1960 MRN [id number]   [facility] 5NW-A Attending Suyapa Grimes,    Hosp Day # 0 PCP None Pcp         Reason for Consultation:  fevers     History of Present Illness:  Sean Richardsonia is a a •  glucose (DEX4) oral liquid 15 g, 15 g, Oral, Q15 Min PRN **OR** Glucose-Vitamin C (DEX-4) chewable tab 4 tablet, 4 tablet, Oral, Q15 Min PRN **OR** dextrose 50 % injection 50 mL, 50 mL, Intravenous, Q15 Min PRN **OR** glucose (DEX4) oral liquid 30 g, 30 Abdomen: Soft, nontender, nondistended. Positive bowel sounds. Musculoskeletal: Full range of motion of all extremities. No arthritis. + edema LLE  Integument: No rash, + erythema LLE.  L foot plantar wound with surrounding fluctuance, + charcot deformit PROCEDURE:  XR FOOT, COMPLETE (MIN 3 VIEWS), LEFT (CPT=73630)  TECHNIQUE:  AP, oblique, and lateral views were obtained. COMPARISON:  None.   INDICATIONS:  Ulcer left foot  PATIENT STATED HISTORY: (As transcribed by Technologist)  Patient offered no additi PROCEDURE:  US VENOUS DOPPLER LEG LEFT - DIAG IMG (CPT=93971)  COMPARISON:  None. INDICATIONS:  eval for DVT  TECHNIQUE:  Real time, grey scale, and duplex ultrasound was used to evaluate the lower extremity venous system.  B-mode two-dimensional images of CONCLUSION:  Patchy right lower lobe consolidation. Dictated by (CST): Karla Paredes MD on 11/04/2020 at 9:36 PM     Finalized by (CST): Karla Paredes MD on 11/04/2020 at 9:36 PM              ASSESSMENT:  1.  Acute febrile illness- due to LLE c 10 point ROS completed and was negative, except for pertinent positive and negatives stated in subjective.     Vital signs:  Temp:  [100 °F (37.8 °C)-103 °F (39.4 °C)] 100.3 °F (37.9 °C)  Pulse:  [] 91  Resp:  [18-26] 18  BP: (121-183)/(48-87) 147/56 • piperacillin tazobactam (ZOSYN) D5W 3.375g/100ml IVPB  3.375 g Intravenous Q8H   • Insulin Aspart Pen  1-10 Units Subcutaneous TID AC and HS      ASSESSMENT / PLAN:      1. Sepsis, POA. Patient with fever, tachycardia and leukocytosis  1.  Multifactorial Signed          [place] HOSPITALIST  Progress Note            Eve Farmer Patient Status:  Inpatient    1960 MRN [id number]   [facility] 5NW-A Attending Landen Colby, [address] [facility] Day # 1 PCP None Pcp      Chief Complaint: Trey Delcid BILT 0.4  --   --   --    TP 8.1  --   --   --       Estimated Creatinine Clearance: 70.5 mL/min (based on SCr of 0.96 mg/dL).     No results for input(s): PTP, INR in the last 168 hours.         Recent Labs   Lab 11/04/20 2026   TROP <0.045      Imaging: 1. statin  10. CAD  1. Continue home meds  11. Anemia, currently at baseline  1.  Iron studies low --> given IV iron        Quality:  · DVT Prophylaxis: [facility]  · CODE status: Full  · Reina: No  · Central line: No     Will the patient be referred to TCC on dis Piperacillin Sod-Tazobactam So (ZOSYN) 3.375 g in dextrose 5 % 100 mL ADD-vantage     Date Action Dose Route User    11/6/2020 0511 New Bag 3.375 g Intravenous Niko Verde RN    11/5/2020 2152 New Bag 3.375 g Intravenous Niko Verde RN    11/

## 2020-11-06 NOTE — PLAN OF CARE
Received pt from PMU. Patient is primarily Upper sorbian speaking. This RN used  line. Patient is A&O x4. VSS. On tele-NSR. On RA. Put on 1L O2 d/t desating while sleeping. PICC to R groin. CHG bath given. Purewick in place. IV abx.    Denies any

## 2020-11-07 VITALS
TEMPERATURE: 98 F | HEART RATE: 76 BPM | RESPIRATION RATE: 18 BRPM | DIASTOLIC BLOOD PRESSURE: 88 MMHG | OXYGEN SATURATION: 98 % | BODY MASS INDEX: 35 KG/M2 | SYSTOLIC BLOOD PRESSURE: 137 MMHG | WEIGHT: 162 LBS

## 2020-11-07 PROCEDURE — 99232 SBSQ HOSP IP/OBS MODERATE 35: CPT | Performed by: PODIATRIST

## 2020-11-07 PROCEDURE — 99239 HOSP IP/OBS DSCHRG MGMT >30: CPT | Performed by: INTERNAL MEDICINE

## 2020-11-07 RX ORDER — CARVEDILOL 12.5 MG/1
12.5 TABLET ORAL 2 TIMES DAILY WITH MEALS
Status: DISCONTINUED | OUTPATIENT
Start: 2020-11-07 | End: 2020-11-08

## 2020-11-07 RX ORDER — POTASSIUM CHLORIDE 20 MEQ/1
40 TABLET, EXTENDED RELEASE ORAL ONCE
Status: COMPLETED | OUTPATIENT
Start: 2020-11-07 | End: 2020-11-07

## 2020-11-07 RX ORDER — ONDANSETRON 4 MG/1
4 TABLET, ORALLY DISINTEGRATING ORAL EVERY 8 HOURS PRN
Qty: 15 TABLET | Refills: 0 | Status: ON HOLD | OUTPATIENT
Start: 2020-11-07 | End: 2021-04-16

## 2020-11-07 RX ORDER — FUROSEMIDE 40 MG/1
40 TABLET ORAL DAILY
Status: DISCONTINUED | OUTPATIENT
Start: 2020-11-07 | End: 2020-11-08

## 2020-11-07 RX ORDER — CEFADROXIL 500 MG/1
500 CAPSULE ORAL 2 TIMES DAILY
Qty: 20 CAPSULE | Refills: 0 | Status: SHIPPED | OUTPATIENT
Start: 2020-11-07 | End: 2020-11-17

## 2020-11-07 RX ORDER — FUROSEMIDE 20 MG/1
40 TABLET ORAL DAILY
Qty: 60 TABLET | Refills: 0 | Status: SHIPPED | OUTPATIENT
Start: 2020-11-07 | End: 2021-08-21 | Stop reason: CLARIF

## 2020-11-07 NOTE — PROGRESS NOTES
[facility]                                                            Progress Note    Katerina Saucedo Patient Status:  Inpatient    1960 MRN [id number]   [facility] 3NE-A Attending Urbano Dozier, DO   Hosp Day # 2 PCP None Pcp has erythema in the leg with increased temperature consistent with cellulitis she has a grade 1 diabetic ulcer under the lateral column of the foot in the area of the cuboid joint.  The ulcer does not show any fluctuance or any significant drainage. Marcelle Weaver the [facility] wound [facility] center and should follow-up there for further treatment with either total contact casting or a Crow walker to help off weight the foot.   Diabetic shoe to off weight the area has been ordered and the patient has received it at the [facility]

## 2020-11-07 NOTE — PROGRESS NOTES
[facility]ITAL[facility]  Progress Note     Damien Alicia Patient Status:  Inpatient    1960 MRN [id number]   [facility] 5NW-A Attending Pamela Orr, [address] [facility] Day # 2 PCP None Pcp     Chief Complaint: Fever    S: No acute events irma --   --    AST 15  --   --   --   --    ALT 12*  --   --   --   --    BILT 0.4  --   --   --   --    TP 8.1  --   --   --   --      Estimated Creatinine Clearance: 70.5 mL/min (based on SCr of 0.96 mg/dL).     No results for input(s): PTP, INR in the last 1 sepsis. Improved BP after 1 L IVF bolus  1. Hold home imdur, losartan  2. Continue decreased dose of home coreg  3. Resume home lasix at lower dose   4. Continue to monitor need for increasing coreg or adding her other home meds back  9. Dyslipidemia  1.  s

## 2020-11-07 NOTE — CM/SW NOTE
MAULIK alerted of expected discharge today with request for Home PT to be added to already in place [facility]. MD orders obtained and [facility] liaison alerted of expected discharge. RN updated.     [facility] home healthcare  P:[phone]  T:[phone]

## 2020-11-07 NOTE — PROGRESS NOTES
INFECTIOUS DISEASE PROGRESS NOTE    Gisella Point Patient Status:  Inpatient    1960 MRN [id number]   [facility] 5NW-A Attending Jenny Hoper, [address] [facility] Day # 2 PCP None Pcp oriented x 3. Vital signs: Blood pressure (!) 173/70, pulse 74, temperature 98.3 °F (36.8 °C), temperature source Oral, resp. rate 18, weight 162 lb (73.5 kg), SpO2 98 %. HEENT: no scleral icterus or conjunctival injection. Moist mucous membranes.  No thr Negative   BILUR Negative   KETUR Negative   BLOODURINE Moderate*   PHURINE 8.5*   PROUR 30 mg/dL*   UROBILINOGEN 0.2   NITRITE Negative   LEUUR Negative   WBCUR 1-4   RBCUR 6-10*   BACUR 1+*   EPIUR Small        Microbiology    Reviewed in [address] and cuneiform bones will be completely obscured by artifact from the surgical hardware.      Regarding the forefoot anterior to the part of the foot completely obscured by the artifact the marrow signal in all of the metatarsal bones and the phalanges is no However, patient should continue to monitor for any new signs of infection. Patient is currently afebrile with WBC wnl and no new symptoms. D/w amanda, Dr. Ely Castañeda, Dr. Heri Bowman and RN. Discussed the case and plan with the patient via .     Fareed

## 2020-11-07 NOTE — DISCHARGE SUMMARY
[facility]  DISCHARGE SUMMARY     Keanu Mccarty Patient Status:  Inpatient    1960 MRN [id number]   [facility] 3NE-A Attending Sanjay Perry, [address] [facility] Day # 2 PCP None Pcp     Date of Admission: 2020  Date of Discharge: with patient at length to take her medications and to not skip meals. In addition, she does not take her blood pressure medications consistently.   Upon arrival to hospital, all her home medications were ordered and she became hypotensive after receiving h Medication List:     Discharge Medications      START taking these medications      Instructions Prescription details   Cefadroxil 500 MG Caps  Commonly known as: DURICEF      Take 1 capsule (500 mg total) by mouth 2 (two) times daily for 10 days.    Stop t Soln  Commonly known as: XALATAN      INT 1 GTT  AEY QPM   Refills: 0     metFORMIN HCl 1000 MG Tabs  Commonly known as: GLUCOPHAGE      Take 1,000 mg by mouth 2 (two) times daily with meals.    Refills: 0     Potassium Chloride ER 20 MEQ Tbcr      Take 20 dressing    -----------------------------------------------------------------------------------------------  PATIENT DISCHARGE INSTRUCTIONS: See electronic chart    Lissy Shah DO  Hospitalist  11/7/2020    Time spent:  > 30 minutes

## 2020-11-07 NOTE — PLAN OF CARE
Used Language line (patient speaks Lao)  Patient is AOx4  On 1L O2  NSR on tele  Last BM 11/6  Garimaale Hernández in place  Denies pain and nausea  Rolls side to side  PT will work with patient, needed to order her diabetic shoe  Wound Care:  Bottom left of foot, un

## 2020-11-10 ENCOUNTER — PATIENT OUTREACH (OUTPATIENT)
Dept: CASE MANAGEMENT | Age: 60
End: 2020-11-10

## 2020-11-10 NOTE — PROGRESS NOTES
Attempted to contact pt twice. First call rang 3 times and then disconnected. Waited a few minutes and then tried again. Phone rang one time and sounded as though it answered however no response. Will wait to see if pt returns the call.

## 2020-11-10 NOTE — PROGRESS NOTES
NCM placed call to patient for HFU, LM requesting a call to [phone] back with a condition update. [facility] also requested patient please call the [address] [place] [address] at [phone] to schedule a hospital follow up appointment.

## 2020-11-10 NOTE — PAYOR COMM NOTE
Payor:  Frankie Liang #:  [id number]  Authorization Number: [id number]    Admit date: 11/5/20  Admit time:  0025  Discharge Date: 11/7/2020

## 2020-12-14 ENCOUNTER — OFFICE VISIT (OUTPATIENT)
Dept: PODIATRY CLINIC | Facility: CLINIC | Age: 60
End: 2020-12-14
Payer: MEDICAID

## 2020-12-14 DIAGNOSIS — L97.423 DIABETIC ULCER OF LEFT MIDFOOT ASSOCIATED WITH TYPE 2 DIABETES MELLITUS, WITH NECROSIS OF MUSCLE (HCC): ICD-10-CM

## 2020-12-14 DIAGNOSIS — E11.621 DIABETIC ULCER OF LEFT MIDFOOT ASSOCIATED WITH TYPE 2 DIABETES MELLITUS, WITH NECROSIS OF MUSCLE (HCC): ICD-10-CM

## 2020-12-14 DIAGNOSIS — Z79.4 TYPE 2 DIABETES MELLITUS WITH HYPOGLYCEMIA WITHOUT COMA, WITH LONG-TERM CURRENT USE OF INSULIN (HCC): Primary | ICD-10-CM

## 2020-12-14 DIAGNOSIS — E11.649 TYPE 2 DIABETES MELLITUS WITH HYPOGLYCEMIA WITHOUT COMA, WITH LONG-TERM CURRENT USE OF INSULIN (HCC): Primary | ICD-10-CM

## 2020-12-14 PROCEDURE — 97597 DBRDMT OPN WND 1ST 20 CM/<: CPT | Performed by: PODIATRIST

## 2020-12-14 NOTE — PROGRESS NOTES
HPI:    Patient ID: Sharla Jerry is a 56year old female. This 40-year-old diabetic presents to me today as a new patient and is accompanied by her son. He spoke for her she was unable to understand.   He states that he is self-referred from a nurse who Chloride ER 20 MEQ Oral Tab CR Take 20 mEq by mouth 2 (two) times daily. • carvedilol 25 MG Oral Tab Take 25 mg by mouth 2 (two) times daily with meals.        Allergies:No Known Allergies   PHYSICAL EXAM:   On physical exam she has a dressing on the le defined types were placed in this encounter.       Meds This Visit:  Requested Prescriptions      No prescriptions requested or ordered in this encounter       Imaging & Referrals:  None       CE#[id number]

## 2020-12-28 ENCOUNTER — OFFICE VISIT (OUTPATIENT)
Dept: PODIATRY CLINIC | Facility: CLINIC | Age: 60
End: 2020-12-28
Payer: MEDICAID

## 2020-12-28 DIAGNOSIS — L97.423 DIABETIC ULCER OF LEFT MIDFOOT ASSOCIATED WITH TYPE 2 DIABETES MELLITUS, WITH NECROSIS OF MUSCLE (HCC): ICD-10-CM

## 2020-12-28 DIAGNOSIS — E11.621 DIABETIC ULCER OF LEFT MIDFOOT ASSOCIATED WITH TYPE 2 DIABETES MELLITUS, WITH NECROSIS OF MUSCLE (HCC): ICD-10-CM

## 2020-12-28 DIAGNOSIS — Z79.4 TYPE 2 DIABETES MELLITUS WITH HYPOGLYCEMIA WITHOUT COMA, WITH LONG-TERM CURRENT USE OF INSULIN (HCC): Primary | ICD-10-CM

## 2020-12-28 DIAGNOSIS — E11.649 TYPE 2 DIABETES MELLITUS WITH HYPOGLYCEMIA WITHOUT COMA, WITH LONG-TERM CURRENT USE OF INSULIN (HCC): Primary | ICD-10-CM

## 2020-12-28 PROCEDURE — 97597 DBRDMT OPN WND 1ST 20 CM/<: CPT | Performed by: PODIATRIST

## 2020-12-28 NOTE — PROGRESS NOTES
HPI:    Patient ID: Amalia Stinson is a 56year old female. 17-year-old diabetic presents for further care in reference to these Charcot wound on the plantar midfoot of the left. She is accompanied by her son.   He states that she has been caring for it w time.  The foot demonstrates pulses although diminished there is marked induration of this foot. There is no erythema patient has loss of sensation. Sharp blade excisional debridement of this area was accomplished.   I reduced epidermal local debris and s

## 2021-04-15 ENCOUNTER — HOSPITAL ENCOUNTER (INPATIENT)
Facility: HOSPITAL | Age: 61
LOS: 3 days | Discharge: HOME | DRG: 690 | End: 2021-04-18
Attending: EMERGENCY MEDICINE | Admitting: HOSPITALIST
Payer: MEDICAID

## 2021-04-15 ENCOUNTER — APPOINTMENT (OUTPATIENT)
Dept: CT IMAGING | Age: 61
DRG: 690 | End: 2021-04-15
Attending: EMERGENCY MEDICINE
Payer: MEDICAID

## 2021-04-15 ENCOUNTER — APPOINTMENT (OUTPATIENT)
Dept: GENERAL RADIOLOGY | Age: 61
DRG: 690 | End: 2021-04-15
Attending: EMERGENCY MEDICINE
Payer: MEDICAID

## 2021-04-15 DIAGNOSIS — R73.9 HYPERGLYCEMIA: ICD-10-CM

## 2021-04-15 DIAGNOSIS — N10 ACUTE PYELONEPHRITIS: Primary | ICD-10-CM

## 2021-04-15 PROBLEM — E87.1 HYPONATREMIA: Status: ACTIVE | Noted: 2021-04-15

## 2021-04-15 PROBLEM — E87.6 HYPOKALEMIA: Status: ACTIVE | Noted: 2021-04-15

## 2021-04-15 PROBLEM — I10 ESSENTIAL HYPERTENSION: Status: ACTIVE | Noted: 2019-12-11

## 2021-04-15 PROCEDURE — 99223 1ST HOSP IP/OBS HIGH 75: CPT | Performed by: HOSPITALIST

## 2021-04-15 PROCEDURE — 71045 X-RAY EXAM CHEST 1 VIEW: CPT | Performed by: EMERGENCY MEDICINE

## 2021-04-15 PROCEDURE — 74177 CT ABD & PELVIS W/CONTRAST: CPT | Performed by: EMERGENCY MEDICINE

## 2021-04-15 RX ORDER — HYDROMORPHONE HYDROCHLORIDE 1 MG/ML
0.5 INJECTION, SOLUTION INTRAMUSCULAR; INTRAVENOUS; SUBCUTANEOUS EVERY 30 MIN PRN
Status: DISCONTINUED | OUTPATIENT
Start: 2021-04-15 | End: 2021-04-18

## 2021-04-15 RX ORDER — ONDANSETRON 2 MG/ML
4 INJECTION INTRAMUSCULAR; INTRAVENOUS EVERY 4 HOURS PRN
Status: DISCONTINUED | OUTPATIENT
Start: 2021-04-15 | End: 2021-04-15

## 2021-04-15 RX ORDER — INSULIN ASPART 100 [IU]/ML
0.15 INJECTION, SOLUTION INTRAVENOUS; SUBCUTANEOUS ONCE
Status: COMPLETED | OUTPATIENT
Start: 2021-04-15 | End: 2021-04-15

## 2021-04-15 RX ORDER — ENOXAPARIN SODIUM 100 MG/ML
40 INJECTION SUBCUTANEOUS DAILY
Status: DISCONTINUED | OUTPATIENT
Start: 2021-04-15 | End: 2021-04-18

## 2021-04-15 RX ORDER — ONDANSETRON 2 MG/ML
4 INJECTION INTRAMUSCULAR; INTRAVENOUS EVERY 6 HOURS PRN
Status: DISCONTINUED | OUTPATIENT
Start: 2021-04-15 | End: 2021-04-18

## 2021-04-15 RX ORDER — AMLODIPINE BESYLATE 5 MG/1
5 TABLET ORAL DAILY
Status: DISCONTINUED | OUTPATIENT
Start: 2021-04-16 | End: 2021-04-18

## 2021-04-15 RX ORDER — SODIUM CHLORIDE 9 MG/ML
INJECTION, SOLUTION INTRAVENOUS CONTINUOUS
Status: DISCONTINUED | OUTPATIENT
Start: 2021-04-15 | End: 2021-04-16

## 2021-04-15 RX ORDER — ATORVASTATIN CALCIUM 20 MG/1
20 TABLET, FILM COATED ORAL NIGHTLY
Status: DISCONTINUED | OUTPATIENT
Start: 2021-04-15 | End: 2021-04-18

## 2021-04-15 RX ORDER — ASPIRIN 81 MG/1
81 TABLET ORAL DAILY
Status: DISCONTINUED | OUTPATIENT
Start: 2021-04-16 | End: 2021-04-16

## 2021-04-15 RX ORDER — ACETAMINOPHEN 500 MG
1000 TABLET ORAL EVERY 6 HOURS PRN
Status: DISCONTINUED | OUTPATIENT
Start: 2021-04-15 | End: 2021-04-18

## 2021-04-15 RX ORDER — DEXTROSE MONOHYDRATE 25 G/50ML
50 INJECTION, SOLUTION INTRAVENOUS
Status: DISCONTINUED | OUTPATIENT
Start: 2021-04-15 | End: 2021-04-18

## 2021-04-15 RX ORDER — FAMOTIDINE 20 MG/1
40 TABLET ORAL DAILY
Status: DISCONTINUED | OUTPATIENT
Start: 2021-04-16 | End: 2021-04-16

## 2021-04-15 RX ORDER — LORATADINE 10 MG/1
10 TABLET ORAL
COMMUNITY

## 2021-04-15 RX ORDER — CARVEDILOL 12.5 MG/1
25 TABLET ORAL 2 TIMES DAILY WITH MEALS
Status: DISCONTINUED | OUTPATIENT
Start: 2021-04-15 | End: 2021-04-18

## 2021-04-15 RX ORDER — ONDANSETRON 2 MG/ML
4 INJECTION INTRAMUSCULAR; INTRAVENOUS ONCE
Status: COMPLETED | OUTPATIENT
Start: 2021-04-15 | End: 2021-04-15

## 2021-04-15 RX ORDER — AMLODIPINE BESYLATE 5 MG/1
5 TABLET ORAL DAILY
COMMUNITY

## 2021-04-15 RX ORDER — LATANOPROST 50 UG/ML
1 SOLUTION/ DROPS OPHTHALMIC NIGHTLY
Status: DISCONTINUED | OUTPATIENT
Start: 2021-04-15 | End: 2021-04-16

## 2021-04-15 NOTE — ED PROVIDER NOTES
Patient Seen in: [facility] In [place]      History   Patient presents with:  Chest Pain Angina  Nausea/Vomiting/Diarrhea    Stated Complaint: chest pain since yesterday along with vomiting    HPI/Subjective:   HPI     Patient present None (Room air)       Current:BP (!) 189/75   Pulse 84   Temp 97.7 °F (36.5 °C) (Temporal)   Resp 16   Ht 149.9 cm (4' 11\")   Wt 72.6 kg   SpO2 99%   BMI 32.32 kg/m²         Physical Exam    General: Awake and alert, lying with eyes closed, appears fatigu Neutrophil Absolute Prelim 15.16 (*)     Neutrophil Absolute 15.16 (*)     All other components within normal limits   TROPONIN I - Normal   LACTIC ACID, PLASMA - Normal   RAPID SARS-COV-2 BY PCR - Normal   CBC WITH DIFFERENTIAL WITH PLATELET    Narrat Dictated by (CST): Elicia Msacorro MD on 4/15/2021 at 1:42 PM     Finalized by (CST): Elicia Mascorro MD on 4/15/2021 at 1:43 PM       CT ABDOMEN PELVIS IV CONTRAST, NO ORAL (ER)    Result Date: 4/15/2021  PROCEDURE:  CT ABDOMEN PELVIS IV CONTRAST, N BOWEL/MESENTERY:  There is a small hiatal hernia at the gastroesophageal junction measuring 3.4 x 3.1 cm. Otherwise the stomach, duodenum sweep and small bowel appear unremarkable.   The colon reveals a moderate degree of retained feces diffusely without placed on IV fluids. She was started on Rocephin to treat for pyelonephritis. She was also given subcutaneous insulin for hyperglycemia. MDM      The patient's symptoms appear to be secondary to acute pyelonephritis.   She still appears uncomfortabl

## 2021-04-15 NOTE — ED QUICK NOTES
Warm blanket given to pt. Son at bedside to translate. [id number] Mari Medrano with translating for patient.

## 2021-04-15 NOTE — ED QUICK NOTES
PT DESAT TO 80 PERCENT AFTER PAIN MEDS GIVEN, PT PLACED ON 2 LITERS NC, O2 SAT BACK UP TO 99 PERCENT.

## 2021-04-16 PROCEDURE — 99232 SBSQ HOSP IP/OBS MODERATE 35: CPT | Performed by: HOSPITALIST

## 2021-04-16 RX ORDER — ALBUTEROL SULFATE 90 UG/1
2 AEROSOL, METERED RESPIRATORY (INHALATION) 4 TIMES DAILY
Status: DISCONTINUED | OUTPATIENT
Start: 2021-04-16 | End: 2021-04-17

## 2021-04-16 RX ORDER — MONTELUKAST SODIUM 10 MG/1
10 TABLET ORAL DAILY
COMMUNITY
Start: 2021-04-08

## 2021-04-16 RX ORDER — ALBUTEROL SULFATE 90 UG/1
2 AEROSOL, METERED RESPIRATORY (INHALATION) DAILY PRN
Status: DISCONTINUED | OUTPATIENT
Start: 2021-04-16 | End: 2021-04-18

## 2021-04-16 RX ORDER — HYDRALAZINE HYDROCHLORIDE 25 MG/1
25 TABLET, FILM COATED ORAL 2 TIMES DAILY
Status: DISCONTINUED | OUTPATIENT
Start: 2021-04-16 | End: 2021-04-16

## 2021-04-16 RX ORDER — METOCLOPRAMIDE HYDROCHLORIDE 5 MG/ML
10 INJECTION INTRAMUSCULAR; INTRAVENOUS
Status: DISCONTINUED | OUTPATIENT
Start: 2021-04-16 | End: 2021-04-18

## 2021-04-16 RX ORDER — FUROSEMIDE 10 MG/ML
20 INJECTION INTRAMUSCULAR; INTRAVENOUS ONCE
Status: COMPLETED | OUTPATIENT
Start: 2021-04-16 | End: 2021-04-16

## 2021-04-16 RX ORDER — HYDRALAZINE HYDROCHLORIDE 25 MG/1
25 TABLET, FILM COATED ORAL 2 TIMES DAILY
Status: DISCONTINUED | OUTPATIENT
Start: 2021-04-16 | End: 2021-04-18

## 2021-04-16 RX ORDER — MONTELUKAST SODIUM 10 MG/1
10 TABLET ORAL NIGHTLY
Status: DISCONTINUED | OUTPATIENT
Start: 2021-04-16 | End: 2021-04-18

## 2021-04-16 RX ORDER — MONTELUKAST SODIUM 10 MG/1
10 TABLET ORAL DAILY
Status: DISCONTINUED | OUTPATIENT
Start: 2021-04-16 | End: 2021-04-16

## 2021-04-16 RX ORDER — ALBUTEROL SULFATE 90 UG/1
2 AEROSOL, METERED RESPIRATORY (INHALATION) 4 TIMES DAILY PRN
COMMUNITY
Start: 2021-04-07

## 2021-04-16 RX ORDER — INSULIN GLARGINE 100 [IU]/ML
40 INJECTION, SOLUTION SUBCUTANEOUS DAILY
COMMUNITY
Start: 2021-04-07

## 2021-04-16 RX ORDER — HYDRALAZINE HYDROCHLORIDE 25 MG/1
25 TABLET, FILM COATED ORAL 2 TIMES DAILY
COMMUNITY
Start: 2021-04-08

## 2021-04-16 RX ORDER — PROCHLORPERAZINE MALEATE 10 MG
10 TABLET ORAL EVERY 6 HOURS PRN
Status: DISCONTINUED | OUTPATIENT
Start: 2021-04-16 | End: 2021-04-18

## 2021-04-16 NOTE — PROGRESS NOTES
5478: Paged Dr. Ilene Glaser about blood sugar 77. Orders received to hold to hold levemir and novolog.

## 2021-04-16 NOTE — DIETARY NOTE
Clinical Nutrition Note    Chart reviewed for high A1c of 11.8%. However, lab results out of date (11/5/2020). Recommend redraw. Please consult if patient status changes or nutrition issues arise.     Amina Valle RD, LDN  Pager [phone]

## 2021-04-16 NOTE — H&P
[facility]ITAL[facility]  History and Physical     Damien Vargas Patient Status:  Inpatient    1960 MRN [id number]   [facility] 3NW-A Attending Jenniffer Logan MD   Hosp Day # 0 PCP None Pcp     Chief Complaint: n/v    History of Present total) by mouth daily. , Disp: 60 tablet, Rfl: 0  glipiZIDE 10 MG Oral Tab, Take 10 mg by mouth 2 (two) times daily before meals. , Disp: , Rfl:   atorvastatin 20 MG Oral Tab, Take 20 mg by mouth nightly., Disp: , Rfl:   carvedilol 25 MG Oral Tab, Take 25 mg Labs:  Recent Labs   Lab 04/15/21  1328   WBC 17.8*   HGB 13.3   MCV 87.7   .0     Recent Labs   Lab 04/15/21  1328   *   BUN 17   CREATSERUM 1.26*   GFRAA 53*   GFRNAA 46*   CA 9.7   ALB 3.8   *   K 3.4*   CL 98   CO2 29.0   ALKPHO 1

## 2021-04-16 NOTE — PROGRESS NOTES
Pt alert and orientedx4. Non english speaking. Call son this shift and spoke with him, stated that patient speaks 'Khmer' Room air. Pulse Ox. On lovenox. Accuchecks QID. 1800 Carb controlled diet. Zofran given for nausea.  Up w/ stand by assist. IVF infusing

## 2021-04-16 NOTE — PAYOR COMM NOTE
--------------  ADMISSION REVIEW     Payor: Frankie Liang #:  [id number]  Authorization Number: [id number]    Admit date: 4/15/21  Admit time:  7:03 PM      Patient Seen in: [facility] [place] 149.9 cm (4' 11\")   Wt 72.6 kg   SpO2 99%   BMI 32.32 kg/m²     Physical Exam    General: Awake and alert, lying with eyes closed, appears fatigued. HEENT: Normocephalic, atraumatic, pupils equal round and reactive to light. Neck: Supple.   Teresa Ellis TROPONIN I - Normal   RAPID SARS-COV-2 BY PCR - Normal   URINE CULTURE, ROUTINE   BLOOD CULTURE   BLOOD CULTURE     EKG 81  Sinus rhythm with PACs with aberrant conduction  Reading: Mild artifact noted, no acute ischemic change compared to previous     X and given her ongoing symptoms, I think she will need to be admitted for IV antibiotics and close observation. Patient and son agree with that plan. Patient will be admitted to Dr. Danitza Adler from the hospitalist service.     Disposition and Plan   Clinical Im latanoprost 0.005 % Ophthalmic Solution, INT 1 GTT  AEY QPM, Disp: , Rfl:   albuterol sulfate (2.5 MG/3ML) 0.083% Inhalation Nebu Soln, Take by nebulization every 6 (six) hours as needed for Wheezing., Disp: , Rfl:   metFORMIN HCl 1000 MG Oral Tab, Take [69-87] 69  Resp:  [16-20] 20  BP: (145-191)/(56-83) 145/56    Respiratory: Diminished, b/l crackles  Cardiovascular: S1, S2. Regular rate and rhythm. No murmurs, rubs or gallops. Abdomen: Soft, nontender, nondistended. Positive bowel sounds.  No reboun Benito Bermudez RN      HYDROmorphone HCl (DILAUDID) 1 MG/ML injection 0.5 mg     Date Action Dose Route User    4/15/2021 1546 Given 0.5 mg Intravenous Anca Goodson RN      insulin aspart (NOVOLOG) 100 UNIT/ML vial 11 Units     Date Action Dose Route User

## 2021-04-16 NOTE — CM/SW NOTE
04/16/21 0800   Discharge disposition   Expected discharge disposition Home or Self   Name of [id number] [facility]     Hi Speaking    SARAVANAN,  RN discussed patient's post d/c needs in care rounds.  No identified needs at this time, MSW will re

## 2021-04-16 NOTE — PLAN OF CARE
Patient is alert and oriented x4. Patient speaks Kinyarwanda.  was used this morning. Lung sounds clear/ diminished in all lobes. Patient c/o SOB. O2 sat 98. Oxygen 1L was put on. Paged Dr. Patrick Fitch for albuterol order.  Per Dr. Patrick Fitch, IV fluids stopped

## 2021-04-17 PROCEDURE — 99232 SBSQ HOSP IP/OBS MODERATE 35: CPT | Performed by: HOSPITALIST

## 2021-04-17 RX ORDER — POTASSIUM CHLORIDE 20 MEQ/1
40 TABLET, EXTENDED RELEASE ORAL ONCE
Status: COMPLETED | OUTPATIENT
Start: 2021-04-17 | End: 2021-04-17

## 2021-04-17 NOTE — PLAN OF CARE
A&O x4. Primarily Occitan speaking, unable to reach pt's son this am for translating, hospitalist at bedside for translation. Lungs clear and diminished bilaterally. Abdomen soft, nontender, nondistended.  Bowel sounds active, pt tolerated diet well this am. V

## 2021-04-17 NOTE — PROGRESS NOTES
[facility]PITAL[facility]  Progress Note     Rojas Beaumont Patient Status:  Inpatient    1960 MRN [id number]   [facility] 3NW-A Attending Jules Bailey MD   Hosp Day # 2 PCP None Pcp     Chief Complaint: Pyelo    S: Patient feels less nause Pro-Calcitonin  No results for input(s): PCT in the last 168 hours. Cardiac  Recent Labs   Lab 04/15/21  1328   TROP <0.045   PBNP 946*       Creatinine Kinase  No results for input(s): CK in the last 168 hours.     Inflammatory Markers  No results

## 2021-04-17 NOTE — PLAN OF CARE
Pt alert and orientedx4. Non english speaking. Speaks Mongolian. Son called tonight to help identify any needs for the patient. Room air. Inhaler given tonight. Lung sounds diminished. Afebrile.  1800 carb controlled diet but does not enjoy food here, son said h

## 2021-04-18 VITALS
HEIGHT: 59 IN | BODY MASS INDEX: 31.75 KG/M2 | DIASTOLIC BLOOD PRESSURE: 74 MMHG | SYSTOLIC BLOOD PRESSURE: 162 MMHG | WEIGHT: 157.5 LBS | OXYGEN SATURATION: 100 % | TEMPERATURE: 99 F | RESPIRATION RATE: 18 BRPM | HEART RATE: 79 BPM

## 2021-04-18 PROCEDURE — 99239 HOSP IP/OBS DSCHRG MGMT >30: CPT | Performed by: HOSPITALIST

## 2021-04-18 RX ORDER — PANTOPRAZOLE SODIUM 40 MG/1
40 TABLET, DELAYED RELEASE ORAL
Status: DISCONTINUED | OUTPATIENT
Start: 2021-04-18 | End: 2021-04-18

## 2021-04-18 RX ORDER — POTASSIUM CHLORIDE 20 MEQ/1
40 TABLET, EXTENDED RELEASE ORAL ONCE
Status: COMPLETED | OUTPATIENT
Start: 2021-04-18 | End: 2021-04-18

## 2021-04-18 RX ORDER — CEPHALEXIN 500 MG/1
500 CAPSULE ORAL 3 TIMES DAILY
Qty: 30 CAPSULE | Refills: 0 | Status: SHIPPED | OUTPATIENT
Start: 2021-04-18 | End: 2021-04-28

## 2021-04-18 NOTE — PROGRESS NOTES
[facility]OSPITAL[facility]  Progress Note     Bella Vallejo Patient Status:  Inpatient    1960 MRN [id number]   [facility] 3NW-A Attending Obi Mendoza MD   Hosp Day # 3 PCP None Pcp     Chief Complaint: Pyelo    S: Patient without complain 0.76 mg/dL). No results for input(s): PTP, INR in the last 168 hours.          COVID-19 Lab Results    COVID-19  Lab Results   Component Value Date    COVID19 Not Detected 04/15/2021    COVID19 Not Detected 11/04/2020       Pro-Calcitonin  No results for

## 2021-04-18 NOTE — PLAN OF CARE
NURSING DISCHARGE NOTE    Discharged Home via Ambulatory. Accompanied by Family member and Support staff  Belongings Taken by patient/family. Pt in stable condition and reports feeling ready to home.  Discharge instructions given to pt's son, he verbal

## 2021-04-19 ENCOUNTER — PATIENT OUTREACH (OUTPATIENT)
Dept: CASE MANAGEMENT | Age: 61
End: 2021-04-19

## 2021-04-19 NOTE — PAYOR COMM NOTE
--------------  DISCHARGE REVIEW    Payor: Frankie Liang #:  [id number]  Authorization Number: [id number]    Admit date: 4/15/21  Admit time:   7:03 PM  Discharge Date: 4/18/2021 11:43 AM     Admitting Physician: Denia Jeffrey

## 2021-04-19 NOTE — DISCHARGE SUMMARY
[facility] HOSPITAL[facility]  DISCHARGE SUMMARY     Candida Riding Patient Status:  Inpatient    1960 MRN [id number]   [facility] 3NW-A Attending No att. providers found   Hosp Day # 3 PCP None Pcp     Date of Admission: 4/15/2021  Date of Disch Discharge Medications      START taking these medications      Instructions Prescription details   cephALEXin 500 MG Caps  Commonly known as: Yamilex Read  Notes to patient: Please take entire course of antibiotics. Take with some food.        [place] 1 capsule ( [place]    Follow-up appointment:   PCP    In 1 week      Appointments for Next 30 Days 4/19/2021 - 5/19/2021 Comment    None          Vital signs:       Physical Exam:    General: No acute distress.    Respiratory: Clear to auscultation bi

## 2021-04-20 NOTE — PROGRESS NOTES
[facility] contact information provided as well as [facility] office number, [phone].

## 2021-06-18 NOTE — PROGRESS NOTES
Chief Complaint  This information was obtained from the patient  Patient here for initial visit for wounds to her left foot.  Patients complain pain on the wound 6/10    Allergies  No known Allergies    HPI  This information was obtained from the patient 200s.  Of note she has history of coronary artery disease status post CABG. Vascular exam done including the visit today revealed triphasic pulse waveforms on the left dorsalis pedis. Recent hospitalization records were all reviewed.   Outpatient foll Essential hypertension  • Hyperlipemia    Past Surgical History:  Procedure Laterality Date  • CABG  • FOOT SURGERY Left    Social History   Tobacco Use     Smoking status: Never Smoker     Smokeless tobacco: Never Used   Alcohol use: Never     Frequency: defined. Wound bed has 51-75% epithelialization, 1-25% pink, firm granulation. The periwound skin color is normal. The periwound skin exhibited: Edema, Dry/Scaly. The temperature of the periwound skin is Warm.  Periwound skin does not exhibit signs or symp devitalized tissue: callus and slough. The following instrument(s) were used: blade, curette, and forceps. Pain control was achieved using N/A. A time out was conducted prior to the start of the procedure.  A minimal amount of bleeding was controlled with p week.    Misc/Additional Orders  Supplement with a daily multivitamin. Increase dietary protein    Care summary  Discussed the Plan of Care at bedside with patient.  The patient verbally acknowledges understanding of all instructions and all questions were relief shoe / inserts / foams  Left foot    Follow-Up Appointments  Return Appointment in 1 week. Misc/Additional Orders  Supplement with a daily multivitamin.   Increase dietary protein    Care summary  Discussed the Plan of Care at bedside with patient Less than or equal to Normal Limit

## 2021-08-21 ENCOUNTER — APPOINTMENT (OUTPATIENT)
Dept: GENERAL RADIOLOGY | Facility: HOSPITAL | Age: 61
DRG: 571 | End: 2021-08-21
Attending: EMERGENCY MEDICINE
Payer: MEDICAID

## 2021-08-21 ENCOUNTER — HOSPITAL ENCOUNTER (INPATIENT)
Facility: HOSPITAL | Age: 61
LOS: 4 days | Discharge: HOME HEALTH | DRG: 571 | End: 2021-08-25
Attending: EMERGENCY MEDICINE | Admitting: HOSPITALIST
Payer: MEDICAID

## 2021-08-21 ENCOUNTER — APPOINTMENT (OUTPATIENT)
Dept: CT IMAGING | Facility: HOSPITAL | Age: 61
DRG: 571 | End: 2021-08-21
Attending: EMERGENCY MEDICINE
Payer: MEDICAID

## 2021-08-21 DIAGNOSIS — L08.9 DIABETIC FOOT INFECTION (HCC): ICD-10-CM

## 2021-08-21 DIAGNOSIS — L03.116 LEFT LEG CELLULITIS: Primary | ICD-10-CM

## 2021-08-21 DIAGNOSIS — E11.628 DIABETIC FOOT INFECTION (HCC): ICD-10-CM

## 2021-08-21 DIAGNOSIS — R11.10 NON-INTRACTABLE VOMITING, PRESENCE OF NAUSEA NOT SPECIFIED, UNSPECIFIED VOMITING TYPE: ICD-10-CM

## 2021-08-21 PROBLEM — L03.119 CELLULITIS AND ABSCESS OF FOOT EXCLUDING TOE: Status: ACTIVE | Noted: 2021-08-21

## 2021-08-21 PROBLEM — L02.619 CELLULITIS AND ABSCESS OF FOOT EXCLUDING TOE: Status: ACTIVE | Noted: 2021-08-21

## 2021-08-21 LAB
ALBUMIN SERPL-MCNC: 3.3 G/DL (ref 3.4–5)
ALBUMIN/GLOB SERPL: 0.6 {RATIO} (ref 1–2)
ALP LIVER SERPL-CCNC: 82 U/L
ALT SERPL-CCNC: 26 U/L
ANION GAP SERPL CALC-SCNC: 6 MMOL/L (ref 0–18)
APTT PPP: 32.5 SECONDS (ref 25.4–36.1)
AST SERPL-CCNC: 50 U/L (ref 15–37)
BASOPHILS # BLD: 0 X10(3) UL (ref 0–0.2)
BASOPHILS NFR BLD: 0 %
BILIRUB SERPL-MCNC: 0.6 MG/DL (ref 0.1–2)
BILIRUB UR QL STRIP.AUTO: NEGATIVE
BUN BLD-MCNC: 15 MG/DL (ref 7–18)
CALCIUM BLD-MCNC: 9.1 MG/DL (ref 8.5–10.1)
CHLORIDE SERPL-SCNC: 100 MMOL/L (ref 98–112)
CLARITY UR REFRACT.AUTO: CLEAR
CO2 SERPL-SCNC: 28 MMOL/L (ref 21–32)
COLOR UR AUTO: YELLOW
CREAT BLD-MCNC: 1.17 MG/DL
EOSINOPHIL # BLD: 0 X10(3) UL (ref 0–0.7)
EOSINOPHIL NFR BLD: 0 %
ERYTHROCYTE [DISTWIDTH] IN BLOOD BY AUTOMATED COUNT: 12.9 %
EST. AVERAGE GLUCOSE BLD GHB EST-MCNC: 232 MG/DL (ref 68–126)
GLOBULIN PLAS-MCNC: 5.1 G/DL (ref 2.8–4.4)
GLUCOSE BLD-MCNC: 108 MG/DL (ref 70–99)
GLUCOSE BLD-MCNC: 119 MG/DL (ref 70–99)
GLUCOSE BLD-MCNC: 54 MG/DL (ref 70–99)
GLUCOSE BLD-MCNC: 63 MG/DL (ref 70–99)
GLUCOSE BLD-MCNC: 78 MG/DL (ref 70–99)
GLUCOSE UR STRIP.AUTO-MCNC: NEGATIVE MG/DL
HBA1C MFR BLD HPLC: 9.7 % (ref ?–5.7)
HCT VFR BLD AUTO: 42.7 %
HGB BLD-MCNC: 13.7 G/DL
INR BLD: 1.21 (ref 0.89–1.11)
LACTATE SERPL-SCNC: 1.4 MMOL/L (ref 0.4–2)
LEUKOCYTE ESTERASE UR QL STRIP.AUTO: NEGATIVE
LYMPHOCYTES NFR BLD: 1.12 X10(3) UL (ref 1–4)
LYMPHOCYTES NFR BLD: 4 %
M PROTEIN MFR SERPL ELPH: 8.4 G/DL (ref 6.4–8.2)
MCH RBC QN AUTO: 27.8 PG (ref 26–34)
MCHC RBC AUTO-ENTMCNC: 32.1 G/DL (ref 31–37)
MCV RBC AUTO: 86.8 FL
MONOCYTES # BLD: 0.56 X10(3) UL (ref 0.1–1)
MONOCYTES NFR BLD: 2 %
MORPHOLOGY: NORMAL
NEUTROPHILS # BLD AUTO: 25.41 X10 (3) UL (ref 1.5–7.7)
NEUTROPHILS NFR BLD: 80 %
NEUTS BAND NFR BLD: 14 %
NEUTS HYPERSEG # BLD: 26.32 X10(3) UL (ref 1.5–7.7)
NEUTS VAC BLD QL SMEAR: PRESENT
NITRITE UR QL STRIP.AUTO: NEGATIVE
OSMOLALITY SERPL CALC.SUM OF ELEC: 280 MOSM/KG (ref 275–295)
PH UR STRIP.AUTO: 7 [PH] (ref 5–8)
PLATELET # BLD AUTO: 279 10(3)UL (ref 150–450)
PLATELET MORPHOLOGY: NORMAL
POTASSIUM SERPL-SCNC: 4.1 MMOL/L (ref 3.5–5.1)
PROT UR STRIP.AUTO-MCNC: 100 MG/DL
PSA SERPL DL<=0.01 NG/ML-MCNC: 15.6 SECONDS (ref 12.2–14.5)
RBC # BLD AUTO: 4.92 X10(6)UL
RBC #/AREA URNS AUTO: >10 /HPF
SARS-COV-2 RNA RESP QL NAA+PROBE: NOT DETECTED
SODIUM SERPL-SCNC: 134 MMOL/L (ref 136–145)
SP GR UR STRIP.AUTO: 1.01 (ref 1–1.03)
TOTAL CELLS COUNTED: 100
TROPONIN I SERPL-MCNC: <0.045 NG/ML (ref ?–0.04)
UROBILINOGEN UR STRIP.AUTO-MCNC: <2 MG/DL
WBC # BLD AUTO: 28 X10(3) UL (ref 4–11)

## 2021-08-21 PROCEDURE — 71045 X-RAY EXAM CHEST 1 VIEW: CPT | Performed by: EMERGENCY MEDICINE

## 2021-08-21 PROCEDURE — 70450 CT HEAD/BRAIN W/O DYE: CPT | Performed by: EMERGENCY MEDICINE

## 2021-08-21 PROCEDURE — 99223 1ST HOSP IP/OBS HIGH 75: CPT | Performed by: HOSPITALIST

## 2021-08-21 PROCEDURE — 73630 X-RAY EXAM OF FOOT: CPT | Performed by: EMERGENCY MEDICINE

## 2021-08-21 RX ORDER — METOCLOPRAMIDE 10 MG/1
10 TABLET ORAL
COMMUNITY

## 2021-08-21 RX ORDER — ATORVASTATIN CALCIUM 20 MG/1
20 TABLET, FILM COATED ORAL NIGHTLY
Status: DISCONTINUED | OUTPATIENT
Start: 2021-08-21 | End: 2021-08-25

## 2021-08-21 RX ORDER — FUROSEMIDE 20 MG/1
20 TABLET ORAL DAILY
Status: DISCONTINUED | OUTPATIENT
Start: 2021-08-22 | End: 2021-08-25

## 2021-08-21 RX ORDER — HYDRALAZINE HYDROCHLORIDE 25 MG/1
25 TABLET, FILM COATED ORAL 2 TIMES DAILY
Status: DISCONTINUED | OUTPATIENT
Start: 2021-08-21 | End: 2021-08-25

## 2021-08-21 RX ORDER — ACETAMINOPHEN 325 MG/1
650 TABLET ORAL EVERY 6 HOURS PRN
Status: DISCONTINUED | OUTPATIENT
Start: 2021-08-21 | End: 2021-08-25

## 2021-08-21 RX ORDER — ACETAMINOPHEN 500 MG
1000 TABLET ORAL ONCE
Status: COMPLETED | OUTPATIENT
Start: 2021-08-21 | End: 2021-08-21

## 2021-08-21 RX ORDER — POLYETHYLENE GLYCOL 3350 17 G/17G
17 POWDER, FOR SOLUTION ORAL DAILY PRN
Status: DISCONTINUED | OUTPATIENT
Start: 2021-08-21 | End: 2021-08-25

## 2021-08-21 RX ORDER — CARVEDILOL 12.5 MG/1
25 TABLET ORAL 2 TIMES DAILY WITH MEALS
Status: DISCONTINUED | OUTPATIENT
Start: 2021-08-22 | End: 2021-08-25

## 2021-08-21 RX ORDER — BISACODYL 10 MG
10 SUPPOSITORY, RECTAL RECTAL
Status: DISCONTINUED | OUTPATIENT
Start: 2021-08-21 | End: 2021-08-25

## 2021-08-21 RX ORDER — MONTELUKAST SODIUM 10 MG/1
10 TABLET ORAL DAILY
Status: DISCONTINUED | OUTPATIENT
Start: 2021-08-22 | End: 2021-08-25

## 2021-08-21 RX ORDER — SODIUM CHLORIDE 9 MG/ML
INJECTION, SOLUTION INTRAVENOUS CONTINUOUS
Status: DISCONTINUED | OUTPATIENT
Start: 2021-08-21 | End: 2021-08-21

## 2021-08-21 RX ORDER — DOCUSATE SODIUM 100 MG/1
100 CAPSULE, LIQUID FILLED ORAL 2 TIMES DAILY
Status: DISCONTINUED | OUTPATIENT
Start: 2021-08-21 | End: 2021-08-25

## 2021-08-21 RX ORDER — PROCHLORPERAZINE EDISYLATE 5 MG/ML
5 INJECTION INTRAMUSCULAR; INTRAVENOUS EVERY 8 HOURS PRN
Status: DISCONTINUED | OUTPATIENT
Start: 2021-08-21 | End: 2021-08-25

## 2021-08-21 RX ORDER — SODIUM PHOSPHATE, DIBASIC AND SODIUM PHOSPHATE, MONOBASIC 7; 19 G/133ML; G/133ML
1 ENEMA RECTAL ONCE AS NEEDED
Status: DISCONTINUED | OUTPATIENT
Start: 2021-08-21 | End: 2021-08-25

## 2021-08-21 RX ORDER — FUROSEMIDE 20 MG/1
20 TABLET ORAL DAILY
COMMUNITY

## 2021-08-21 RX ORDER — ONDANSETRON 2 MG/ML
4 INJECTION INTRAMUSCULAR; INTRAVENOUS EVERY 6 HOURS PRN
Status: DISCONTINUED | OUTPATIENT
Start: 2021-08-21 | End: 2021-08-25

## 2021-08-21 RX ORDER — ENOXAPARIN SODIUM 100 MG/ML
40 INJECTION SUBCUTANEOUS NIGHTLY
Status: DISCONTINUED | OUTPATIENT
Start: 2021-08-21 | End: 2021-08-25

## 2021-08-21 RX ORDER — DEXTROSE MONOHYDRATE 25 G/50ML
50 INJECTION, SOLUTION INTRAVENOUS
Status: DISCONTINUED | OUTPATIENT
Start: 2021-08-21 | End: 2021-08-25

## 2021-08-21 RX ORDER — ONDANSETRON 2 MG/ML
4 INJECTION INTRAMUSCULAR; INTRAVENOUS EVERY 4 HOURS PRN
Status: DISCONTINUED | OUTPATIENT
Start: 2021-08-21 | End: 2021-08-21 | Stop reason: HOSPADM

## 2021-08-21 RX ORDER — DEXTROSE AND SODIUM CHLORIDE 5; .9 G/100ML; G/100ML
INJECTION, SOLUTION INTRAVENOUS CONTINUOUS
Status: DISCONTINUED | OUTPATIENT
Start: 2021-08-21 | End: 2021-08-22

## 2021-08-21 RX ORDER — CETIRIZINE HYDROCHLORIDE 10 MG/1
10 TABLET ORAL DAILY
Status: DISCONTINUED | OUTPATIENT
Start: 2021-08-22 | End: 2021-08-25

## 2021-08-21 RX ORDER — AMLODIPINE BESYLATE 5 MG/1
5 TABLET ORAL DAILY
Status: DISCONTINUED | OUTPATIENT
Start: 2021-08-22 | End: 2021-08-25

## 2021-08-21 NOTE — ED INITIAL ASSESSMENT (HPI)
Pt found on the floor by family member with vomit surrounding her, unable to get up on her own. Pt states that she was feeling weak and fell down. No head trauma, no LOC per pt. No cough. Pt c/o neck pain since the fall.  Pt also concerned with ulcer to the

## 2021-08-21 NOTE — SEPSIS REASSESSMENT
[facility]    Sepsis Reassessment Note    /62   Pulse 112   Temp (S) (!) 101 °F (38.3 °C) (Temporal)   Resp 21   Wt 72.6 kg   SpO2 97%   BMI 32.32 kg/m²      I completed the sepsis reassessment at 16:32    Cardiac:  Regularity: Regular  Rate:

## 2021-08-21 NOTE — H&P
[facility]  History and Physical     Venessa Crawford Patient Status:  Emergency    1960 MRN [id number]   Location [facility] Attending Osman Larson, Renu Fisher MD   Hosp Day # 0 PCP None Pcp     Chief Complaint: left lower Take 25 mg by mouth 2 (two) times daily. , Disp: , Rfl:   loratadine 10 MG Oral Tab, Take 10 mg by mouth daily as needed. , Disp: , Rfl:   amLODIPine Besylate 5 MG Oral Tab, Take 5 mg by mouth daily. , Disp: , Rfl:   furosemide 20 MG Oral Tab, Take 2 tablet BILT 0.6   TP 8.4*       Estimated Creatinine Clearance: 56.4 mL/min (A) (based on SCr of 1.17 mg/dL (H)).     Recent Labs   Lab 08/21/21  1453   PTP 15.6*   INR 1.21*       COVID-19 Lab Results    COVID-19  Lab Results   Component Value Date    COVID19 N

## 2021-08-21 NOTE — ED QUICK NOTES
MD updated on pts fever. Verbal orders received. Pt updated on status. Voicing understanding with nurse.

## 2021-08-21 NOTE — ED QUICK NOTES
Speaking with pts son Saira Clark. Pts son states that left foot ulcer has been present for 3 years, \"it got some water in it\" and has been swollen for a couple of weeks. Pt had also been c/o pain in the neck and chest pain starting today.      Sons phon

## 2021-08-21 NOTE — ED PROVIDER NOTES
Patient Seen in: [facility] Emergency Department      History   Patient presents with:  Fall  Leg or Foot Injury  Fever    Stated Complaint: fall neck pain sob infected leg hypertension nvd    HPI/Subjective:   HPI    Patient is a 41-year-old female Exam    Vital signs noted. GENERAL: Patient is awake, supine on the cart, appearing generally unwell. HEENT: Head is without evidence of trauma. Extraocular muscles are intact. Pupils are equal and reactive to light. NECK: Neck is supple.  The trachea 25.41 (*)     All other components within normal limits   LACTIC ACID, PLASMA - Normal   TROPONIN I - Normal   PTT, ACTIVATED - Normal   RAPID SARS-COV-2 BY PCR - Normal   CBC WITH DIFFERENTIAL WITH PLATELET    Narrative:      The following orders were crea the wound itself deep soft tissue gas is not appreciated. EFFUSION:  None visible. OTHER:  Negative. CONCLUSION:  There is severe swelling and evidence of ulceration at the site of the wound without evidence of deep soft tissue gas.    Dictated b Technologist)  Patient offered no additional history at this time. FINDINGS:  Cardiac size and mediastinal contours are normal. Lungs clear. Otherwise unremarkable. Sternal wires noted. Evidence of prior CABG. CONCLUSION:  No acute process.

## 2021-08-22 LAB
ANION GAP SERPL CALC-SCNC: 3 MMOL/L (ref 0–18)
ATRIAL RATE: 110 BPM
BASOPHILS # BLD: 0 X10(3) UL (ref 0–0.2)
BASOPHILS NFR BLD: 0 %
BUN BLD-MCNC: 13 MG/DL (ref 7–18)
CALCIUM BLD-MCNC: 7.9 MG/DL (ref 8.5–10.1)
CHLORIDE SERPL-SCNC: 108 MMOL/L (ref 98–112)
CO2 SERPL-SCNC: 26 MMOL/L (ref 21–32)
CREAT BLD-MCNC: 0.81 MG/DL
EOSINOPHIL # BLD: 0 X10(3) UL (ref 0–0.7)
EOSINOPHIL NFR BLD: 0 %
ERYTHROCYTE [DISTWIDTH] IN BLOOD BY AUTOMATED COUNT: 12.9 %
GLUCOSE BLD-MCNC: 144 MG/DL (ref 70–99)
GLUCOSE BLD-MCNC: 148 MG/DL (ref 70–99)
GLUCOSE BLD-MCNC: 154 MG/DL (ref 70–99)
GLUCOSE BLD-MCNC: 178 MG/DL (ref 70–99)
GLUCOSE BLD-MCNC: 207 MG/DL (ref 70–99)
GLUCOSE BLD-MCNC: 230 MG/DL (ref 70–99)
GLUCOSE BLD-MCNC: 246 MG/DL (ref 70–99)
HCT VFR BLD AUTO: 36.8 %
HGB BLD-MCNC: 11.8 G/DL
LYMPHOCYTES NFR BLD: 0.69 X10(3) UL (ref 1–4)
LYMPHOCYTES NFR BLD: 4 %
MCH RBC QN AUTO: 27.7 PG (ref 26–34)
MCHC RBC AUTO-ENTMCNC: 32.1 G/DL (ref 31–37)
MCV RBC AUTO: 86.4 FL
MONOCYTES # BLD: 0 X10(3) UL (ref 0.1–1)
MONOCYTES NFR BLD: 0 %
MORPHOLOGY: NORMAL
NEUTROPHILS # BLD AUTO: 15.01 X10 (3) UL (ref 1.5–7.7)
NEUTROPHILS NFR BLD: 87 %
NEUTS BAND NFR BLD: 9 %
NEUTS HYPERSEG # BLD: 16.51 X10(3) UL (ref 1.5–7.7)
OSMOLALITY SERPL CALC.SUM OF ELEC: 287 MOSM/KG (ref 275–295)
P AXIS: 49 DEGREES
P-R INTERVAL: 186 MS
PLATELET # BLD AUTO: 213 10(3)UL (ref 150–450)
PLATELET MORPHOLOGY: NORMAL
POTASSIUM SERPL-SCNC: 3.6 MMOL/L (ref 3.5–5.1)
Q-T INTERVAL: 312 MS
QRS DURATION: 78 MS
QTC CALCULATION (BEZET): 422 MS
R AXIS: 55 DEGREES
RBC # BLD AUTO: 4.26 X10(6)UL
SODIUM SERPL-SCNC: 137 MMOL/L (ref 136–145)
T AXIS: 86 DEGREES
TOTAL CELLS COUNTED: 100
VENTRICULAR RATE: 110 BPM
WBC # BLD AUTO: 17.2 X10(3) UL (ref 4–11)

## 2021-08-22 PROCEDURE — 99232 SBSQ HOSP IP/OBS MODERATE 35: CPT | Performed by: HOSPITALIST

## 2021-08-22 NOTE — PLAN OF CARE
NURSING ADMISSION NOTE      Patient admitted via Cart  Oriented to room. Safety precautions initiated. Bed in low position. Call light in reach. Pt arrived from ER with admitting orders. Pt drowsy but arousable, vitals stable, afebrile.  BG 54 start

## 2021-08-22 NOTE — PROGRESS NOTES
[facility]  Progress Note     Kae Mervat Patient Status:  Inpatient    1960 MRN [id number]   [facility] 4NW-A Attending Radha Mayer [address] Day # 1 PCP None Pcp     Chief Complaint: Left foot ulcer    S: Patien PTP 15.6*   INR 1.21*            COVID-19 Lab Results    COVID-19  Lab Results   Component Value Date    COVID19 Not Detected 08/21/2021    COVID19 Not Detected 04/15/2021    COVID19 Not Detected 11/04/2020       Pro-Calcitonin  No results for input(s): Yes    Will the patient be referred to TCC on discharge?:  No  Estimated date of discharge: TBD  Discharge is dependent on: Clinical improvement  At this point Ms. Carol Owen is expected to be discharge to: TBD    Plan of care discussed with patient and nursing

## 2021-08-23 ENCOUNTER — APPOINTMENT (OUTPATIENT)
Dept: MRI IMAGING | Facility: HOSPITAL | Age: 61
DRG: 571 | End: 2021-08-23
Attending: HOSPITALIST
Payer: MEDICAID

## 2021-08-23 LAB
ANION GAP SERPL CALC-SCNC: 1 MMOL/L (ref 0–18)
BUN BLD-MCNC: 13 MG/DL (ref 7–18)
CALCIUM BLD-MCNC: 8.7 MG/DL (ref 8.5–10.1)
CHLORIDE SERPL-SCNC: 108 MMOL/L (ref 98–112)
CO2 SERPL-SCNC: 28 MMOL/L (ref 21–32)
CREAT BLD-MCNC: 0.84 MG/DL
ERYTHROCYTE [DISTWIDTH] IN BLOOD BY AUTOMATED COUNT: 12.9 %
GLUCOSE BLD-MCNC: 101 MG/DL (ref 70–99)
GLUCOSE BLD-MCNC: 104 MG/DL (ref 70–99)
GLUCOSE BLD-MCNC: 165 MG/DL (ref 70–99)
GLUCOSE BLD-MCNC: 194 MG/DL (ref 70–99)
GLUCOSE BLD-MCNC: 234 MG/DL (ref 70–99)
GLUCOSE BLD-MCNC: 377 MG/DL (ref 70–99)
GLUCOSE BLD-MCNC: 59 MG/DL (ref 70–99)
GLUCOSE BLD-MCNC: 90 MG/DL (ref 70–99)
HCT VFR BLD AUTO: 34.9 %
HGB BLD-MCNC: 11.3 G/DL
MCH RBC QN AUTO: 28 PG (ref 26–34)
MCHC RBC AUTO-ENTMCNC: 32.4 G/DL (ref 31–37)
MCV RBC AUTO: 86.6 FL
OSMOLALITY SERPL CALC.SUM OF ELEC: 288 MOSM/KG (ref 275–295)
PLATELET # BLD AUTO: 205 10(3)UL (ref 150–450)
POTASSIUM SERPL-SCNC: 3.8 MMOL/L (ref 3.5–5.1)
RBC # BLD AUTO: 4.03 X10(6)UL
SODIUM SERPL-SCNC: 137 MMOL/L (ref 136–145)
WBC # BLD AUTO: 10 X10(3) UL (ref 4–11)

## 2021-08-23 PROCEDURE — 73720 MRI LWR EXTREMITY W/O&W/DYE: CPT | Performed by: HOSPITALIST

## 2021-08-23 PROCEDURE — 99232 SBSQ HOSP IP/OBS MODERATE 35: CPT | Performed by: HOSPITALIST

## 2021-08-23 RX ORDER — DEXTROSE AND SODIUM CHLORIDE 5; .9 G/100ML; G/100ML
INJECTION, SOLUTION INTRAVENOUS CONTINUOUS
Status: DISCONTINUED | OUTPATIENT
Start: 2021-08-23 | End: 2021-08-23

## 2021-08-23 NOTE — CONSULTS
[facility]  Report of Inpatient Wound Care Consultation    Venessa Crawford Patient Status:  Inpatient    1960 MRN [id number]   [facility] 4NW-A Attending Abisai [address] [place] [address] [place] Day # 2 PCP None Pcp     Reason for [facility]) Left;Plantar (Active)   Date First Assessed/Time First Assessed: 08/23/21 1025   Wound Location Orientation: Left;Plantar      Assessments 8/23/2021 10:25 AM   Wound Image         L plantar-hindfoot diabetic foot ulcer, L dorsal pedal pulse noted, capillar

## 2021-08-23 NOTE — PAYOR COMM NOTE
--------------  ADMISSION REVIEW     Payor: Frankie Liang #:  [id number]  Authorization Number: [id number]    Admit date: 8/21/21  Admit time:  8:51 PM       History   HPI  Patient is a 64-year-old female, with multi 50 (*)     GFR, -American 57 (*)     AST 50 (*)     Total Protein 8.4 (*)     Albumin 3.3 (*)     Globulin  5.1 (*)     A/G Ratio 0.6 (*)     All other components within normal limits   URINALYSIS WITH CULTURE REFLEX - Abnormal; Notable for the foll status post CABG, type 2 diabetes, hypertension, hyperlipidemia who states earlier today was feeling weak and fell to the ground family found patient on the floor surrounded by her vomit. Patient denies losing consciousness.   Patient was having some fever We will also add vancomycin for MRSA coverage with patient being diabetic. 2. Type 2 diabetes-we will place on hypoglycemia protocol with long-acting insulin during the day and correction factor insulin. Will hold patient's oral diabetes medications.   3. vancomycin. Will consult wound care for tomorrow. We will have them evaluate and see if patient needs a debridement.   2. Type 2 diabetes-we will place on hypoglycemia protocol with long-acting insulin during the day and correction factor insulin.  Will h 8/23/2021 0815 Given 100 mg Oral Teresita Huff RN    8/22/2021 2151 Given 100 mg Oral Logan Magana RN      enoxaparin (LOVENOX) 40 MG/0.4ML injection 40 mg     Date Action Dose Route User    8/22/2021 2151 Given 40 mg Subcutaneous (Left Lower Abdo

## 2021-08-23 NOTE — PROGRESS NOTES
[facility] Dosing Service  Antibiotic Dosing    Eve Farmer is a 59year old for whom pharmacy is dosing Unasyn for treatment of  cellulitis. Allergies: has No Known Allergies.     Vitals: /52 (BP Location: Right arm)   Pulse 70   Temp 98.7 °F

## 2021-08-23 NOTE — PROGRESS NOTES
[facility]  Progress Note     Katerina Ceballos Patient Status:  Inpatient    1960 MRN [id number]   [facility] 4NW-A Attending Ponce LundKATHARINE [facility] Day [facility] PCP None Pcp     Chief Complaint: Left foot ulcer    S: Patien --   --        Estimated Creatinine Clearance: 84.3 mL/min (based on SCr of 0.84 mg/dL).     Recent Labs   Lab 08/21/21  1453   PTP 15.6*   INR 1.21*            COVID-19 Lab Results    COVID-19  Lab Results   Component Value Date    COVID19 Not Detected 08 and will order albuterol inhaler as needed    Plan of care: As above    Quality:  · DVT Prophylaxis: Lovenox  · CODE status: Full  · Reina: None  · Central line: None  · If COVID testing is negative, may discontinue isolation: Yes    Will the patient be re

## 2021-08-23 NOTE — DIETARY NOTE
[address] Galvez     Admitting diagnosis:  Left leg cellulitis [J58.625]  Diabetic foot infection ([facility] [R53.541, L08.9]  Non-intractable vomiting, presence of nausea not specified, unspecified vomiting type [R11.10] Patient is at low nutrition risk at this time. Please consult if patient status changes or nutrition issues arise.     Blank Gonzalez RDN  Clinical Dietitian  Pager- [phone] Hxbkxnn- [phone]

## 2021-08-23 NOTE — PLAN OF CARE
Received pt aaox4, denies pain, left plantar wound cleansed with saline and covered with mepilex, nted with small amount of bloody drainage, BP 87/37, dr. Mihai Peralta informed, 0.9 bolus 500ml given and BP improved.   Problem: Patient/Family Goals  Goal: Jeannie Manzano physical limitations  - Instruct pt to call for assistance with activity based on assessment  - Modify environment to reduce risk of injury  - Provide assistive devices as appropriate  - Consider OT/PT consult to assist with strengthening/mobility  - Encou

## 2021-08-23 NOTE — PLAN OF CARE
Pt AOx3, VSS, Up to bathroom. Seen by woundcare this morning. MRI of foot ordered to evaluate for osteo. MRI screening form completed with pt's son. IV abx per MAR. IVF with dextrose started s/p hypoglycemia this morning. Blood glucose stable.  Good appetit

## 2021-08-23 NOTE — PLAN OF CARE
Patient is A/O. Tele with NSR. Room air. On IV Unasyn for left foot wound. Plan for wound care to evaluate today. Dressing intact. Able to march to the bathroom with 1 person assist. Fall precaution maintained.    Problem: RISK FOR INFECTION - ADULT  Goal:

## 2021-08-24 LAB
GLUCOSE BLD-MCNC: 123 MG/DL (ref 70–99)
GLUCOSE BLD-MCNC: 224 MG/DL (ref 70–99)
GLUCOSE BLD-MCNC: 273 MG/DL (ref 70–99)
GLUCOSE BLD-MCNC: 276 MG/DL (ref 70–99)

## 2021-08-24 PROCEDURE — 0JBR0ZZ EXCISION OF LEFT FOOT SUBCUTANEOUS TISSUE AND FASCIA, OPEN APPROACH: ICD-10-PCS | Performed by: PODIATRIST

## 2021-08-24 PROCEDURE — 99232 SBSQ HOSP IP/OBS MODERATE 35: CPT | Performed by: HOSPITALIST

## 2021-08-24 NOTE — PHYSICAL THERAPY NOTE
Pt eval orders recd, chart reviewed. Podiatry eval still pending, per clarisa Walker, hold PT until done. Will re attempt when appropriate.

## 2021-08-24 NOTE — PLAN OF CARE
A/o. Eating and drinking. MRI Left foot completed tonight. Afebrile. Up to the bathroom with 1 assist. Nausea with small emesis prn zofran given with relief. Ptn tylenol for pain. Dressing intact. Ankit Lockhart continues.    Problem: RISK FOR INFECTION - ADULT

## 2021-08-24 NOTE — PROGRESS NOTES
[facility]  Progress Note     Venessa Crawford Patient Status:  Inpatient    1960 MRN [id number]   [facility] 4NW-A Attending Chaya Earing  [address] Day # 3 PCP None Pcp     Chief Complaint: Left foot ulcer    S: Yobani 8.4*  --   --        Estimated Creatinine Clearance: 84.7 mL/min (based on SCr of 0.84 mg/dL).     Recent Labs   Lab 08/21/21  1453   PTP 15.6*   INR 1.21*            COVID-19 Lab Results    COVID-19  Lab Results   Component Value Date    COVID19 Not Detect above    Quality:  · DVT Prophylaxis: Lovenox  · CODE status: Full  · Reina: None  · Central line: None  · If COVID testing is negative, may discontinue isolation: Yes    Will the patient be referred to TCC on discharge?:  No  Estimated date of discharge:

## 2021-08-24 NOTE — PLAN OF CARE
Pt AOx3, VSS. Up in chair. IV abx per MAR. Blood glucose more stable. Good appetite. Podiatry consult placed, will see this evening. One emesis later this morning, but no nausea afterward. Prn zofran given. 1800--seen by podiatry this afternoon.  No surge

## 2021-08-24 NOTE — CONSULTS
Podiatry Consult    Reason for Consult: Ulceration plantar aspect left foot. HPI: Patient is a 57-year-old female with multiple comorbidities including diabetes. Patient relates a longstanding history of an ulceration to her left foot.   He is fol atorvastatin 20 MG Oral Tab, Take 20 mg by mouth nightly., Disp: , Rfl:   carvedilol 25 MG Oral Tab, Take 25 mg by mouth 2 (two) times daily with meals. , Disp: , Rfl:           ROS:  A comprehensive 10 point review of systems was completed.   Pertinent po likely patient will need some type of reconstructive surgery with her Charcot deformity. Would recommend patient follow-up with Estela Urbina as an outpatient to discuss timing and planning of possible surgical intervention.   Currently no need for further I&

## 2021-08-24 NOTE — PAYOR COMM NOTE
--------------  CONTINUED STAY REVIEW---REQUESTING ADDITIONAL DAY 8/24      Payor: Frankie Liang #:  [id number]  Authorization Number: [id number]    Admit date: 8/21/21  Admit time:  8:51 PM    Admitting Physician: Magda Yeh 8. 7   ALB 3.3*  --   --    * 137 137   K 4.1 3.6 3.8    108 108   CO2 28.0 26.0 28.0   ALKPHO 82  --   --    AST 50*  --   --    ALT 26  --   --    BILT 0.6  --   --    TP 8.4*  --   --          Estimated Creatinine Clearance: 84.7 mL/min (base on amlodipine, carvedilol and hydralazine. 5. Hyperlipidemia-we will continue statin therapy.   6. Asthma-we will continue on Singulair and will order albuterol inhaler as needed     Plan of care: As above     Quality:  · DVT Prophylaxis: Lovenox  · CODE s enoxaparin (LOVENOX) 40 MG/0.4ML injection 40 mg     Date Action Dose Route User    8/23/2021 2023 Given 40 mg Subcutaneous (Left Lower Abdomen) Logan Magana, RN      furosemide (LASIX) tab 20 mg     Date Action Dose Route User    8/24/2021 8496

## 2021-08-25 VITALS
SYSTOLIC BLOOD PRESSURE: 168 MMHG | WEIGHT: 172.63 LBS | HEART RATE: 77 BPM | RESPIRATION RATE: 18 BRPM | TEMPERATURE: 98 F | OXYGEN SATURATION: 97 % | BODY MASS INDEX: 35 KG/M2 | DIASTOLIC BLOOD PRESSURE: 63 MMHG

## 2021-08-25 LAB
GLUCOSE BLD-MCNC: 219 MG/DL (ref 70–99)
GLUCOSE BLD-MCNC: 326 MG/DL (ref 70–99)

## 2021-08-25 PROCEDURE — 99239 HOSP IP/OBS DSCHRG MGMT >30: CPT | Performed by: HOSPITALIST

## 2021-08-25 RX ORDER — CEPHALEXIN 500 MG/1
500 CAPSULE ORAL 4 TIMES DAILY
Qty: 40 CAPSULE | Refills: 0 | Status: SHIPPED | OUTPATIENT
Start: 2021-08-25 | End: 2021-09-04

## 2021-08-25 NOTE — PROGRESS NOTES
Patient was provided with discharge instructions, education, and follow up information. This RN also reviewed discharge instructions (including new medication) with patient's daughter Yoanna Farmer over the phone prior to discharge.   Prescriptions were already se

## 2021-08-25 NOTE — PROGRESS NOTES
[facility]  Progress Note     Eric Cordoba Patient Status:  Inpatient    1960 MRN [id number]   [facility]NW-A [facility] None Pcp     Chief Complaint: Left foot ulcer    S: Patien Estimated Creatinine Clearance: 84.7 mL/min (based on SCr of 0.84 mg/dL).     Recent Labs   Lab 08/21/21  1453   PTP 15.6*   INR 1.21*            COVID-19 Lab Results    COVID-19  Lab Results   Component Value Date    COVID19 Not Detected 08/21/2021 above    Quality:  · DVT Prophylaxis: Lovenox  · CODE status: Full  · Reina: None  · Central line: None  · If COVID testing is negative, may discontinue isolation: Yes    Will the patient be referred to TCC on discharge?:  No  Estimated date of discharge:

## 2021-08-25 NOTE — PHYSICAL THERAPY NOTE
PHYSICAL THERAPY QUICK EVALUATION - INPATIENT    Room Number: 416/416-A  Evaluation Date: 8/25/2021  Presenting Problem: L foot cellulitis  Physician Order: PT Eval and Treat    Problem List  Principal Problem:    Left leg cellulitis  Active Problems: difficulty does the patient currently have. ..  -   Turning over in bed (including adjusting bedclothes, sheets and blankets)?: None   -   Sitting down on and standing up from a chair with arms (e.g., wheelchair, bedside commode, etc.): None   -   Moving fr Therapy needs at this time. Patient discharged from Physical Therapy services. Please re-order if a new functional limitation presents during this admission. GOALS  Patient was able to achieve the following goals . ..     Patient was able to transfer Sa

## 2021-08-25 NOTE — PLAN OF CARE
Patient declined wound dressing change to the wound. Wound care supplies at bedside packed by discharge RN & sent w/ patient. WIll follow up w/ wound clinic out patient as well as podiatry.

## 2021-08-25 NOTE — CM/SW NOTE
Patient screened for needs at [place]. She has been evaluated by PT who recommends home. No home health recommended. Patient has history with outpatient wound care clinic and per podiatrist note, is to follow up there at [place].  CM tries to call both sons listed on

## 2021-08-25 NOTE — PLAN OF CARE
Pt is aox4. Does understand some [place]. Medicated per orders. States no pain. Cliff Star in between abx. Up ad lebron. Left foot wound wrapped and dressing is c/d/I. Insulin coverage per protocol.       Problem: PAIN - ADULT  Goal: Verbalizes/displays adequate INTERVENTIONS:  - Monitor Blood Glucose as ordered  - Assess for signs and symptoms of hyperglycemia and hypoglycemia  - Administer ordered medications to maintain glucose within target range  - Assess barriers to adequate nutritional intake and initiate n

## 2021-08-25 NOTE — CM/SW NOTE
08/25/21 1600   Discharge disposition   Expected discharge disposition Home-Health   Post Acute Care Provider [address]  ([facility])   Discharge transportation Private car

## 2021-08-25 NOTE — PLAN OF CARE
HAd good food intake for breakfast. No glycemic reactions. Ambulated on the halls w/ walker. Per therapy patient did very well & no recommendations made. Patient has own walker at home. Wound dressing intact & dry,no complications noted.

## 2021-08-26 ENCOUNTER — PATIENT OUTREACH (OUTPATIENT)
Dept: CASE MANAGEMENT | Age: 61
End: 2021-08-26

## 2021-08-26 NOTE — PAYOR COMM NOTE
--------------  DISCHARGE REVIEW    Payor: Frankie Liang #:  [id number]  Authorization Number: [id number]    Admit date: 8/21/21  Admit time:   8:51 PM  Discharge Date: 8/25/2021  5:42 PM     Admitting Physician: Yelena Mason

## 2021-08-26 NOTE — PROGRESS NOTES
NCM attempted to contact the patient for HFU. No answer and unable to leave a message as the VM is not set up. NCM will try again later.

## 2021-08-30 NOTE — DISCHARGE SUMMARY
[facility] HOSPITALIST  DISCHARGE SUMMARY     Marty Murray Patient Status:  Inpatient    1960 MRN [id number]   [facility] 4NW-A Attending No att. providers found   [address] Day # 4 PCP None Pcp     Date of Admission: 2021  Date of Disch pending at Discharge:   · None    Consultants:  • Podiatry-Dr. Jalen Gamino    Discharge Medication List:     Discharge Medications      START taking these medications      Instructions Prescription details   cephalexin 500 MG Caps  Commonly known as: Sivan Moya [phone], [address] [id number]    Hours: 24-hours Phone: [phone]   · cephalexin 500 MG Caps         [place]    Follow-up appointment:   JESSICA [address] [place] [address]

## 2021-09-07 LAB
ALBUMIN SERPL-MCNC: 2.9 G/DL (ref 3.6–5.1)
ALBUMIN/GLOB SERPL: 0.6 {RATIO} (ref 1–2.4)
ALP SERPL-CCNC: 69 UNITS/L (ref 45–117)
ALT SERPL-CCNC: 21 UNITS/L
ANION GAP SERPL CALC-SCNC: 9 MMOL/L (ref 10–20)
AST SERPL-CCNC: 32 UNITS/L
BASOPHILS # BLD: 0 K/MCL (ref 0–0.3)
BASOPHILS NFR BLD: 0 %
BILIRUB SERPL-MCNC: 0.3 MG/DL (ref 0.2–1)
BUN SERPL-MCNC: 22 MG/DL (ref 6–20)
BUN/CREAT SERPL: 21 (ref 7–25)
CALCIUM SERPL-MCNC: 8.1 MG/DL (ref 8.4–10.2)
CHLORIDE SERPL-SCNC: 97 MMOL/L (ref 98–107)
CO2 SERPL-SCNC: 27 MMOL/L (ref 21–32)
CREAT SERPL-MCNC: 1.07 MG/DL (ref 0.51–0.95)
DEPRECATED RDW RBC: 40.9 FL (ref 39–50)
EOSINOPHIL # BLD: 0 K/MCL (ref 0–0.5)
EOSINOPHIL NFR BLD: 0 %
ERYTHROCYTE [DISTWIDTH] IN BLOOD: 13 % (ref 11–15)
FASTING DURATION TIME PATIENT: ABNORMAL H
GFR SERPLBLD BASED ON 1.73 SQ M-ARVRAT: 55 ML/MIN
GLOBULIN SER-MCNC: 4.5 G/DL (ref 2–4)
GLUCOSE SERPL-MCNC: 228 MG/DL (ref 65–99)
HCT VFR BLD CALC: 38.8 % (ref 36–46.5)
HGB BLD-MCNC: 12.6 G/DL (ref 12–15.5)
IMM GRANULOCYTES # BLD AUTO: 0 K/MCL (ref 0–0.2)
IMM GRANULOCYTES # BLD: 0 %
LYMPHOCYTES # BLD: 1.7 K/MCL (ref 1–4)
LYMPHOCYTES NFR BLD: 33 %
MCH RBC QN AUTO: 28.1 PG (ref 26–34)
MCHC RBC AUTO-ENTMCNC: 32.5 G/DL (ref 32–36.5)
MCV RBC AUTO: 86.4 FL (ref 78–100)
MONOCYTES # BLD: 0.6 K/MCL (ref 0.3–0.9)
MONOCYTES NFR BLD: 12 %
NEUTROPHILS # BLD: 2.7 K/MCL (ref 1.8–7.7)
NEUTROPHILS NFR BLD: 55 %
NRBC BLD MANUAL-RTO: 0 /100 WBC
PLATELET # BLD AUTO: 212 K/MCL (ref 140–450)
POTASSIUM SERPL-SCNC: 4.1 MMOL/L (ref 3.4–5.1)
PROT SERPL-MCNC: 7.4 G/DL (ref 6.4–8.2)
RBC # BLD: 4.49 MIL/MCL (ref 4–5.2)
SODIUM SERPL-SCNC: 129 MMOL/L (ref 135–145)
WBC # BLD: 5 K/MCL (ref 4.2–11)

## 2021-09-07 PROCEDURE — 80053 COMPREHEN METABOLIC PANEL: CPT | Performed by: EMERGENCY MEDICINE

## 2021-09-07 PROCEDURE — C9803 HOPD COVID-19 SPEC COLLECT: HCPCS

## 2021-09-07 PROCEDURE — 96361 HYDRATE IV INFUSION ADD-ON: CPT

## 2021-09-07 PROCEDURE — 85025 COMPLETE CBC W/AUTO DIFF WBC: CPT | Performed by: EMERGENCY MEDICINE

## 2021-09-07 PROCEDURE — 99283 EMERGENCY DEPT VISIT LOW MDM: CPT

## 2021-09-07 PROCEDURE — 96374 THER/PROPH/DIAG INJ IV PUSH: CPT

## 2021-09-08 ENCOUNTER — APPOINTMENT (OUTPATIENT)
Dept: GENERAL RADIOLOGY | Age: 61
End: 2021-09-08
Attending: EMERGENCY MEDICINE

## 2021-09-08 ENCOUNTER — HOSPITAL ENCOUNTER (EMERGENCY)
Age: 61
Discharge: HOME | End: 2021-09-08
Attending: EMERGENCY MEDICINE

## 2021-09-08 VITALS
SYSTOLIC BLOOD PRESSURE: 156 MMHG | DIASTOLIC BLOOD PRESSURE: 69 MMHG | TEMPERATURE: 96.5 F | HEART RATE: 76 BPM | OXYGEN SATURATION: 97 % | RESPIRATION RATE: 16 BRPM

## 2021-09-08 DIAGNOSIS — U07.1 COVID-19 VIRUS INFECTION: Primary | ICD-10-CM

## 2021-09-08 LAB
APPEARANCE UR: CLEAR
BILIRUB UR QL STRIP: NEGATIVE
COLOR UR: YELLOW
GLUCOSE UR STRIP-MCNC: NEGATIVE MG/DL
HGB UR QL STRIP: NEGATIVE
KETONES UR STRIP-MCNC: NEGATIVE MG/DL
LACTATE BLDV-SCNC: 0.6 MMOL/L (ref 0–2)
LEUKOCYTE ESTERASE UR QL STRIP: NEGATIVE
NITRITE UR QL STRIP: NEGATIVE
PH UR STRIP: 5 [PH] (ref 5–7)
PROT UR STRIP-MCNC: NEGATIVE MG/DL
SARS-COV-2 N GENE CT SPEC QN NAA N2: 19.6
SARS-COV-2 RNA RESP QL NAA+PROBE: DETECTED
SERVICE CMNT-IMP: ABNORMAL
SP GR UR STRIP: 1.01 (ref 1–1.03)
UROBILINOGEN UR STRIP-MCNC: 0.2 MG/DL

## 2021-09-08 PROCEDURE — 83605 ASSAY OF LACTIC ACID: CPT | Performed by: EMERGENCY MEDICINE

## 2021-09-08 PROCEDURE — 10002800 HB RX 250 W HCPCS: Performed by: EMERGENCY MEDICINE

## 2021-09-08 PROCEDURE — 10002807 HB RX 258: Performed by: EMERGENCY MEDICINE

## 2021-09-08 PROCEDURE — 71045 X-RAY EXAM CHEST 1 VIEW: CPT

## 2021-09-08 PROCEDURE — 96374 THER/PROPH/DIAG INJ IV PUSH: CPT

## 2021-09-08 PROCEDURE — 87635 SARS-COV-2 COVID-19 AMP PRB: CPT | Performed by: EMERGENCY MEDICINE

## 2021-09-08 PROCEDURE — 81003 URINALYSIS AUTO W/O SCOPE: CPT | Performed by: EMERGENCY MEDICINE

## 2021-09-08 PROCEDURE — 99284 EMERGENCY DEPT VISIT MOD MDM: CPT | Performed by: EMERGENCY MEDICINE

## 2021-09-08 RX ORDER — ONDANSETRON 4 MG/1
4 TABLET, ORALLY DISINTEGRATING ORAL EVERY 8 HOURS PRN
Qty: 12 TABLET | Refills: 0 | Status: SHIPPED | OUTPATIENT
Start: 2021-09-08 | End: 2022-03-29

## 2021-09-08 RX ORDER — ONDANSETRON 2 MG/ML
4 INJECTION INTRAMUSCULAR; INTRAVENOUS ONCE
Status: COMPLETED | OUTPATIENT
Start: 2021-09-08 | End: 2021-09-08

## 2021-09-08 RX ADMIN — ONDANSETRON 4 MG: 2 INJECTION INTRAMUSCULAR; INTRAVENOUS at 03:24

## 2021-09-08 RX ADMIN — SODIUM CHLORIDE 1000 ML: 9 INJECTION, SOLUTION INTRAVENOUS at 03:24

## 2021-09-08 ASSESSMENT — PAIN SCALES - GENERAL: PAINLEVEL_OUTOF10: 3

## 2021-09-08 ASSESSMENT — PAIN DESCRIPTION - PAIN TYPE: TYPE: ACUTE PAIN

## 2022-02-22 ENCOUNTER — EXTERNAL RECORD (OUTPATIENT)
Dept: HEALTH INFORMATION MANAGEMENT | Facility: OTHER | Age: 62
End: 2022-02-22

## 2022-03-02 ENCOUNTER — HOSPITAL ENCOUNTER (OUTPATIENT)
Dept: NUCLEAR MEDICINE | Facility: HOSPITAL | Age: 62
Discharge: HOME | End: 2022-03-02
Attending: SPECIALIST
Payer: MEDICAID

## 2022-03-02 ENCOUNTER — HOSPITAL ENCOUNTER (OUTPATIENT)
Dept: CV DIAGNOSTICS | Facility: HOSPITAL | Age: 62
Discharge: HOME | End: 2022-03-02
Attending: SPECIALIST
Payer: MEDICAID

## 2022-03-02 DIAGNOSIS — I25.10 CORONARY ARTERY DISEASE: ICD-10-CM

## 2022-03-02 DIAGNOSIS — E11.9 DIABETES (HCC): ICD-10-CM

## 2022-03-02 DIAGNOSIS — I25.10 CAD (CORONARY ARTERY DISEASE): ICD-10-CM

## 2022-03-02 DIAGNOSIS — I10 ESSENTIAL HYPERTENSION, MALIGNANT: ICD-10-CM

## 2022-03-02 DIAGNOSIS — E78.2 MIXED HYPERLIPIDEMIA: ICD-10-CM

## 2022-03-02 DIAGNOSIS — R06.02 SHORTNESS OF BREATH: ICD-10-CM

## 2022-03-02 DIAGNOSIS — R94.31 ABNORMAL ELECTROCARDIOGRAM: ICD-10-CM

## 2022-03-02 DIAGNOSIS — E11.9 DIABETES MELLITUS (HCC): ICD-10-CM

## 2022-03-02 PROCEDURE — 93306 TTE W/DOPPLER COMPLETE: CPT | Performed by: SPECIALIST

## 2022-03-02 PROCEDURE — 93016 CV STRESS TEST SUPVJ ONLY: CPT | Performed by: SPECIALIST

## 2022-03-02 PROCEDURE — 93017 CV STRESS TEST TRACING ONLY: CPT | Performed by: SPECIALIST

## 2022-03-02 PROCEDURE — 93018 CV STRESS TEST I&R ONLY: CPT | Performed by: SPECIALIST

## 2022-03-02 PROCEDURE — 78452 HT MUSCLE IMAGE SPECT MULT: CPT | Performed by: SPECIALIST

## 2022-03-22 ENCOUNTER — HOSPITAL ENCOUNTER (OUTPATIENT)
Dept: LAB | Age: 62
Discharge: HOME | End: 2022-03-22
Attending: SPECIALIST

## 2022-03-22 DIAGNOSIS — E11.9 TYPE 2 DIABETES MELLITUS WITHOUT COMPLICATIONS (CMD): ICD-10-CM

## 2022-03-22 DIAGNOSIS — R06.02 SHORTNESS OF BREATH: ICD-10-CM

## 2022-03-22 DIAGNOSIS — Z01.810 ENCOUNTER FOR PREPROCEDURAL CARDIOVASCULAR EXAMINATION: ICD-10-CM

## 2022-03-22 DIAGNOSIS — R94.39 ABNORMAL RESULT OF OTHER CARDIOVASCULAR FUNCTION STUDY: Primary | ICD-10-CM

## 2022-03-22 DIAGNOSIS — Z95.1 PRESENCE OF AORTOCORONARY BYPASS GRAFT: ICD-10-CM

## 2022-03-22 DIAGNOSIS — I10 ESSENTIAL (PRIMARY) HYPERTENSION: ICD-10-CM

## 2022-03-22 DIAGNOSIS — I25.10 ATHEROSCLEROTIC HEART DISEASE OF NATIVE CORONARY ARTERY WITHOUT ANGINA PECTORIS: ICD-10-CM

## 2022-03-22 LAB
ALBUMIN SERPL-MCNC: 3.4 G/DL (ref 3.6–5.1)
ALBUMIN/GLOB SERPL: 0.8 {RATIO} (ref 1–2.4)
ALP SERPL-CCNC: 87 UNITS/L (ref 45–117)
ALT SERPL-CCNC: 17 UNITS/L
ANION GAP SERPL CALC-SCNC: 7 MMOL/L (ref 10–20)
AST SERPL-CCNC: 14 UNITS/L
BASOPHILS # BLD: 0.1 K/MCL (ref 0–0.3)
BASOPHILS NFR BLD: 1 %
BILIRUB SERPL-MCNC: 0.3 MG/DL (ref 0.2–1)
BUN SERPL-MCNC: 25 MG/DL (ref 6–20)
BUN/CREAT SERPL: 26 (ref 7–25)
CALCIUM SERPL-MCNC: 9 MG/DL (ref 8.4–10.2)
CHLORIDE SERPL-SCNC: 109 MMOL/L (ref 98–107)
CO2 SERPL-SCNC: 27 MMOL/L (ref 21–32)
CREAT SERPL-MCNC: 0.98 MG/DL (ref 0.51–0.95)
DEPRECATED RDW RBC: 43.8 FL (ref 39–50)
EOSINOPHIL # BLD: 0.2 K/MCL (ref 0–0.5)
EOSINOPHIL NFR BLD: 3 %
ERYTHROCYTE [DISTWIDTH] IN BLOOD: 13.7 % (ref 11–15)
FASTING DURATION TIME PATIENT: ABNORMAL H
GFR SERPLBLD BASED ON 1.73 SQ M-ARVRAT: 62 ML/MIN
GLOBULIN SER-MCNC: 4.2 G/DL (ref 2–4)
GLUCOSE SERPL-MCNC: 125 MG/DL (ref 70–99)
HCT VFR BLD CALC: 39.4 % (ref 36–46.5)
HGB BLD-MCNC: 12.3 G/DL (ref 12–15.5)
IMM GRANULOCYTES # BLD AUTO: 0 K/MCL (ref 0–0.2)
IMM GRANULOCYTES # BLD: 0 %
INR PPP: 1
LYMPHOCYTES # BLD: 2.8 K/MCL (ref 1–4)
LYMPHOCYTES NFR BLD: 35 %
MCH RBC QN AUTO: 27.3 PG (ref 26–34)
MCHC RBC AUTO-ENTMCNC: 31.2 G/DL (ref 32–36.5)
MCV RBC AUTO: 87.6 FL (ref 78–100)
MONOCYTES # BLD: 0.7 K/MCL (ref 0.3–0.9)
MONOCYTES NFR BLD: 8 %
NEUTROPHILS # BLD: 4.2 K/MCL (ref 1.8–7.7)
NEUTROPHILS NFR BLD: 53 %
NRBC BLD MANUAL-RTO: 0 /100 WBC
PLATELET # BLD AUTO: 259 K/MCL (ref 140–450)
POTASSIUM SERPL-SCNC: 4.1 MMOL/L (ref 3.4–5.1)
PROT SERPL-MCNC: 7.6 G/DL (ref 6.4–8.2)
PROTHROMBIN TIME: 10.6 SEC (ref 9.7–11.8)
RBC # BLD: 4.5 MIL/MCL (ref 4–5.2)
SODIUM SERPL-SCNC: 139 MMOL/L (ref 135–145)
WBC # BLD: 7.9 K/MCL (ref 4.2–11)

## 2022-03-22 PROCEDURE — 85610 PROTHROMBIN TIME: CPT

## 2022-03-22 PROCEDURE — 36415 COLL VENOUS BLD VENIPUNCTURE: CPT

## 2022-03-22 PROCEDURE — 85025 COMPLETE CBC W/AUTO DIFF WBC: CPT

## 2022-03-22 PROCEDURE — 80053 COMPREHEN METABOLIC PANEL: CPT

## 2022-03-23 DIAGNOSIS — Z01.812 PRE-PROCEDURE LAB EXAM: Primary | ICD-10-CM

## 2022-03-29 ENCOUNTER — LAB SERVICES (OUTPATIENT)
Dept: LAB | Age: 62
End: 2022-03-29

## 2022-03-29 DIAGNOSIS — Z01.812 PRE-PROCEDURE LAB EXAM: ICD-10-CM

## 2022-03-29 PROCEDURE — U0003 INFECTIOUS AGENT DETECTION BY NUCLEIC ACID (DNA OR RNA); SEVERE ACUTE RESPIRATORY SYNDROME CORONAVIRUS 2 (SARS-COV-2) (CORONAVIRUS DISEASE [COVID-19]), AMPLIFIED PROBE TECHNIQUE, MAKING USE OF HIGH THROUGHPUT TECHNOLOGIES AS DESCRIBED BY CMS-2020-01-R: HCPCS | Performed by: INTERNAL MEDICINE

## 2022-03-29 PROCEDURE — U0005 INFEC AGEN DETEC AMPLI PROBE: HCPCS | Performed by: INTERNAL MEDICINE

## 2022-03-30 LAB
FLUAV RNA RESP QL NAA+PROBE: NOT DETECTED
FLUBV RNA RESP QL NAA+PROBE: NOT DETECTED
SARS-COV-2 RNA RESP QL NAA+PROBE: NOT DETECTED
SERVICE CMNT-IMP: NORMAL
SERVICE CMNT-IMP: NORMAL

## 2022-03-31 ENCOUNTER — HOSPITAL ENCOUNTER (INPATIENT)
Age: 62
LOS: 1 days | Discharge: HOME | DRG: 191 | End: 2022-04-01
Attending: SPECIALIST | Admitting: SPECIALIST

## 2022-03-31 DIAGNOSIS — R94.39 ABNORMAL NUCLEAR STRESS TEST: ICD-10-CM

## 2022-03-31 DIAGNOSIS — E11.9 TYPE 2 DIABETES MELLITUS WITHOUT COMPLICATION, UNSPECIFIED WHETHER LONG TERM INSULIN USE (CMD): ICD-10-CM

## 2022-03-31 DIAGNOSIS — R06.02 SHORTNESS OF BREATH: ICD-10-CM

## 2022-03-31 DIAGNOSIS — I25.10 CORONARY ARTERY DISEASE, UNSPECIFIED VESSEL OR LESION TYPE, UNSPECIFIED WHETHER ANGINA PRESENT, UNSPECIFIED WHETHER NATIVE OR TRANSPLANTED HEART: ICD-10-CM

## 2022-03-31 DIAGNOSIS — Z95.1 S/P CABG (CORONARY ARTERY BYPASS GRAFT): ICD-10-CM

## 2022-03-31 PROBLEM — I10 HTN (HYPERTENSION), BENIGN: Status: ACTIVE | Noted: 2022-03-31

## 2022-03-31 PROBLEM — E78.5 DYSLIPIDEMIA: Status: ACTIVE | Noted: 2022-03-31

## 2022-03-31 PROBLEM — N18.9 CKD (CHRONIC KIDNEY DISEASE): Status: ACTIVE | Noted: 2022-03-31

## 2022-03-31 LAB
ACT BLD: 400 BASELINE/TARGET RANGES ARE SET BY CLINICIANS FOR EACH PATIENT/PROCEDURE
ACT BLD: 400 BASELINE/TARGET RANGES ARE SET BY CLINICIANS FOR EACH PATIENT/PROCEDURE
GLUCOSE BLDC GLUCOMTR-MCNC: 100 MG/DL (ref 70–99)
GLUCOSE BLDC GLUCOMTR-MCNC: 104 MG/DL (ref 70–99)
GLUCOSE BLDC GLUCOMTR-MCNC: 147 MG/DL (ref 70–99)

## 2022-03-31 PROCEDURE — 10002803 HB RX 637: Performed by: NURSE PRACTITIONER

## 2022-03-31 PROCEDURE — B2131ZZ FLUOROSCOPY OF MULTIPLE CORONARY ARTERY BYPASS GRAFTS USING LOW OSMOLAR CONTRAST: ICD-10-PCS | Performed by: SPECIALIST

## 2022-03-31 PROCEDURE — C1894 INTRO/SHEATH, NON-LASER: HCPCS | Performed by: SPECIALIST

## 2022-03-31 PROCEDURE — C1760 CLOSURE DEV, VASC: HCPCS | Performed by: SPECIALIST

## 2022-03-31 PROCEDURE — C1887 CATHETER, GUIDING: HCPCS | Performed by: SPECIALIST

## 2022-03-31 PROCEDURE — 10002803 HB RX 637: Performed by: SPECIALIST

## 2022-03-31 PROCEDURE — 4A023N7 MEASUREMENT OF CARDIAC SAMPLING AND PRESSURE, LEFT HEART, PERCUTANEOUS APPROACH: ICD-10-PCS | Performed by: SPECIALIST

## 2022-03-31 PROCEDURE — 10002807 HB RX 258: Performed by: SPECIALIST

## 2022-03-31 PROCEDURE — B2111ZZ FLUOROSCOPY OF MULTIPLE CORONARY ARTERIES USING LOW OSMOLAR CONTRAST: ICD-10-PCS | Performed by: SPECIALIST

## 2022-03-31 PROCEDURE — 92920 PRQ TRLUML C ANGIOP 1ART&/BR: CPT | Performed by: SPECIALIST

## 2022-03-31 PROCEDURE — 82962 GLUCOSE BLOOD TEST: CPT

## 2022-03-31 PROCEDURE — B3101ZZ FLUOROSCOPY OF THORACIC AORTA USING LOW OSMOLAR CONTRAST: ICD-10-PCS | Performed by: SPECIALIST

## 2022-03-31 PROCEDURE — 93005 ELECTROCARDIOGRAM TRACING: CPT | Performed by: SPECIALIST

## 2022-03-31 PROCEDURE — 10002800 HB RX 250 W HCPCS: Performed by: SPECIALIST

## 2022-03-31 PROCEDURE — 10006027 HB SUPPLY 278: Performed by: SPECIALIST

## 2022-03-31 PROCEDURE — 10006023 HB SUPPLY 272: Performed by: SPECIALIST

## 2022-03-31 PROCEDURE — 13000001 HB PHASE II RECOVERY EA 30 MINUTES: Performed by: SPECIALIST

## 2022-03-31 PROCEDURE — 99152 MOD SED SAME PHYS/QHP 5/>YRS: CPT | Performed by: SPECIALIST

## 2022-03-31 PROCEDURE — 99153 MOD SED SAME PHYS/QHP EA: CPT | Performed by: SPECIALIST

## 2022-03-31 PROCEDURE — 10002801 HB RX 250 W/O HCPCS: Performed by: SPECIALIST

## 2022-03-31 PROCEDURE — 10002805 HB CONTRAST AGENT: Performed by: SPECIALIST

## 2022-03-31 PROCEDURE — C1769 GUIDE WIRE: HCPCS | Performed by: SPECIALIST

## 2022-03-31 PROCEDURE — 93459 L HRT ART/GRFT ANGIO: CPT | Performed by: SPECIALIST

## 2022-03-31 PROCEDURE — 10006031 HB ROOM CHARGE TELEMETRY

## 2022-03-31 PROCEDURE — B2181ZZ FLUOROSCOPY OF LEFT INTERNAL MAMMARY BYPASS GRAFT USING LOW OSMOLAR CONTRAST: ICD-10-PCS | Performed by: SPECIALIST

## 2022-03-31 PROCEDURE — 10002800 HB RX 250 W HCPCS: Performed by: NURSE PRACTITIONER

## 2022-03-31 PROCEDURE — C1725 CATH, TRANSLUMIN NON-LASER: HCPCS | Performed by: SPECIALIST

## 2022-03-31 PROCEDURE — 85347 COAGULATION TIME ACTIVATED: CPT | Performed by: SPECIALIST

## 2022-03-31 DEVICE — PERCLOSE™ PROSTYLE™ SUTURE-MEDIATED CLOSURE AND REPAIR SYSTEM
Type: IMPLANTABLE DEVICE | Site: FEMORAL ARTERY | Status: FUNCTIONAL
Brand: PERCLOSE™ PROSTYLE™

## 2022-03-31 RX ORDER — 0.9 % SODIUM CHLORIDE 0.9 %
2 VIAL (ML) INJECTION EVERY 12 HOURS SCHEDULED
Status: DISCONTINUED | OUTPATIENT
Start: 2022-03-31 | End: 2022-03-31

## 2022-03-31 RX ORDER — LIDOCAINE HYDROCHLORIDE 20 MG/ML
INJECTION, SOLUTION EPIDURAL; INFILTRATION; INTRACAUDAL; PERINEURAL PRN
Status: DISCONTINUED | OUTPATIENT
Start: 2022-03-31 | End: 2022-03-31 | Stop reason: HOSPADM

## 2022-03-31 RX ORDER — ACETAMINOPHEN 325 MG/1
650 TABLET ORAL EVERY 4 HOURS PRN
Status: DISCONTINUED | OUTPATIENT
Start: 2022-03-31 | End: 2022-04-01 | Stop reason: HOSPADM

## 2022-03-31 RX ORDER — DEXTROSE MONOHYDRATE 25 G/50ML
25 INJECTION, SOLUTION INTRAVENOUS PRN
Status: DISCONTINUED | OUTPATIENT
Start: 2022-03-31 | End: 2022-04-01 | Stop reason: HOSPADM

## 2022-03-31 RX ORDER — ENOXAPARIN SODIUM 100 MG/ML
40 INJECTION SUBCUTANEOUS DAILY
Status: DISCONTINUED | OUTPATIENT
Start: 2022-04-01 | End: 2022-04-01 | Stop reason: HOSPADM

## 2022-03-31 RX ORDER — NICOTINE POLACRILEX 4 MG
30 LOZENGE BUCCAL PRN
Status: DISCONTINUED | OUTPATIENT
Start: 2022-03-31 | End: 2022-04-01 | Stop reason: HOSPADM

## 2022-03-31 RX ORDER — HYDRALAZINE HYDROCHLORIDE 20 MG/ML
10 INJECTION INTRAMUSCULAR; INTRAVENOUS EVERY 4 HOURS PRN
Status: DISCONTINUED | OUTPATIENT
Start: 2022-03-31 | End: 2022-03-31

## 2022-03-31 RX ORDER — INSULIN GLARGINE 100 [IU]/ML
10 INJECTION, SOLUTION SUBCUTANEOUS NIGHTLY
Status: DISCONTINUED | OUTPATIENT
Start: 2022-03-31 | End: 2022-04-01 | Stop reason: HOSPADM

## 2022-03-31 RX ORDER — PROCHLORPERAZINE MALEATE 5 MG/1
5 TABLET ORAL EVERY 4 HOURS PRN
Status: DISCONTINUED | OUTPATIENT
Start: 2022-03-31 | End: 2022-04-01 | Stop reason: HOSPADM

## 2022-03-31 RX ORDER — ASPIRIN 81 MG/1
81 TABLET, CHEWABLE ORAL ONCE
Status: DISCONTINUED | OUTPATIENT
Start: 2022-03-31 | End: 2022-03-31

## 2022-03-31 RX ORDER — 0.9 % SODIUM CHLORIDE 0.9 %
2 VIAL (ML) INJECTION EVERY 12 HOURS SCHEDULED
Status: DISCONTINUED | OUTPATIENT
Start: 2022-03-31 | End: 2022-04-01 | Stop reason: HOSPADM

## 2022-03-31 RX ORDER — CLONIDINE HYDROCHLORIDE 0.1 MG/1
0.1 TABLET ORAL EVERY 4 HOURS PRN
Status: DISCONTINUED | OUTPATIENT
Start: 2022-03-31 | End: 2022-03-31

## 2022-03-31 RX ORDER — AMLODIPINE BESYLATE 5 MG/1
5 TABLET ORAL DAILY
Status: DISCONTINUED | OUTPATIENT
Start: 2022-03-31 | End: 2022-04-01 | Stop reason: HOSPADM

## 2022-03-31 RX ORDER — FAMOTIDINE 20 MG/1
20 TABLET, FILM COATED ORAL 2 TIMES DAILY
Status: DISCONTINUED | OUTPATIENT
Start: 2022-03-31 | End: 2022-04-01 | Stop reason: HOSPADM

## 2022-03-31 RX ORDER — DEXTROSE MONOHYDRATE 25 G/50ML
12.5 INJECTION, SOLUTION INTRAVENOUS PRN
Status: DISCONTINUED | OUTPATIENT
Start: 2022-03-31 | End: 2022-04-01 | Stop reason: HOSPADM

## 2022-03-31 RX ORDER — POLYETHYLENE GLYCOL 3350 17 G/17G
17 POWDER, FOR SOLUTION ORAL DAILY PRN
Status: DISCONTINUED | OUTPATIENT
Start: 2022-03-31 | End: 2022-04-01 | Stop reason: HOSPADM

## 2022-03-31 RX ORDER — ISOSORBIDE MONONITRATE 60 MG/1
60 TABLET, EXTENDED RELEASE ORAL DAILY
Status: DISCONTINUED | OUTPATIENT
Start: 2022-03-31 | End: 2022-04-01 | Stop reason: HOSPADM

## 2022-03-31 RX ORDER — ATORVASTATIN CALCIUM 20 MG/1
20 TABLET, FILM COATED ORAL DAILY
Status: DISCONTINUED | OUTPATIENT
Start: 2022-03-31 | End: 2022-04-01 | Stop reason: HOSPADM

## 2022-03-31 RX ORDER — NICOTINE POLACRILEX 4 MG
15 LOZENGE BUCCAL PRN
Status: DISCONTINUED | OUTPATIENT
Start: 2022-03-31 | End: 2022-04-01 | Stop reason: HOSPADM

## 2022-03-31 RX ORDER — CARVEDILOL 25 MG/1
25 TABLET ORAL 2 TIMES DAILY
Status: DISCONTINUED | OUTPATIENT
Start: 2022-03-31 | End: 2022-04-01 | Stop reason: HOSPADM

## 2022-03-31 RX ORDER — SODIUM CHLORIDE 9 MG/ML
1 INJECTION, SOLUTION INTRAVENOUS CONTINUOUS
Status: DISCONTINUED | OUTPATIENT
Start: 2022-03-31 | End: 2022-04-01 | Stop reason: HOSPADM

## 2022-03-31 RX ORDER — MAGNESIUM HYDROXIDE/ALUMINUM HYDROXICE/SIMETHICONE 120; 1200; 1200 MG/30ML; MG/30ML; MG/30ML
30 SUSPENSION ORAL EVERY 4 HOURS PRN
Status: DISCONTINUED | OUTPATIENT
Start: 2022-03-31 | End: 2022-04-01 | Stop reason: HOSPADM

## 2022-03-31 RX ORDER — HEPARIN SODIUM 1000 [USP'U]/ML
INJECTION, SOLUTION INTRAVENOUS; SUBCUTANEOUS PRN
Status: DISCONTINUED | OUTPATIENT
Start: 2022-03-31 | End: 2022-03-31 | Stop reason: HOSPADM

## 2022-03-31 RX ORDER — FUROSEMIDE 40 MG/1
80 TABLET ORAL DAILY
Status: DISCONTINUED | OUTPATIENT
Start: 2022-03-31 | End: 2022-04-01 | Stop reason: HOSPADM

## 2022-03-31 RX ORDER — MIDAZOLAM HYDROCHLORIDE 1 MG/ML
INJECTION, SOLUTION INTRAMUSCULAR; INTRAVENOUS PRN
Status: DISCONTINUED | OUTPATIENT
Start: 2022-03-31 | End: 2022-03-31 | Stop reason: HOSPADM

## 2022-03-31 RX ORDER — AMOXICILLIN 250 MG
2 CAPSULE ORAL DAILY PRN
Status: DISCONTINUED | OUTPATIENT
Start: 2022-03-31 | End: 2022-04-01 | Stop reason: HOSPADM

## 2022-03-31 RX ORDER — GLIPIZIDE 10 MG/1
10 TABLET ORAL
Status: DISCONTINUED | OUTPATIENT
Start: 2022-03-31 | End: 2022-04-01 | Stop reason: HOSPADM

## 2022-03-31 RX ORDER — SODIUM CHLORIDE 9 MG/ML
INJECTION, SOLUTION INTRAVENOUS CONTINUOUS
Status: DISCONTINUED | OUTPATIENT
Start: 2022-03-31 | End: 2022-03-31

## 2022-03-31 RX ORDER — ONDANSETRON 2 MG/ML
4 INJECTION INTRAMUSCULAR; INTRAVENOUS 2 TIMES DAILY PRN
Status: DISCONTINUED | OUTPATIENT
Start: 2022-03-31 | End: 2022-04-01 | Stop reason: HOSPADM

## 2022-03-31 RX ORDER — HYDRALAZINE HYDROCHLORIDE 50 MG/1
50 TABLET, FILM COATED ORAL 2 TIMES DAILY
Status: DISCONTINUED | OUTPATIENT
Start: 2022-03-31 | End: 2022-04-01 | Stop reason: HOSPADM

## 2022-03-31 RX ORDER — ACETAMINOPHEN 650 MG/1
650 SUPPOSITORY RECTAL EVERY 4 HOURS PRN
Status: DISCONTINUED | OUTPATIENT
Start: 2022-03-31 | End: 2022-04-01 | Stop reason: HOSPADM

## 2022-03-31 RX ADMIN — CARVEDILOL 25 MG: 25 TABLET, FILM COATED ORAL at 21:12

## 2022-03-31 RX ADMIN — SODIUM CHLORIDE 1 ML/KG/HR: 9 INJECTION, SOLUTION INTRAVENOUS at 15:57

## 2022-03-31 RX ADMIN — ATORVASTATIN CALCIUM 20 MG: 20 TABLET, FILM COATED ORAL at 18:54

## 2022-03-31 RX ADMIN — INSULIN GLARGINE 10 UNITS: 100 INJECTION, SOLUTION SUBCUTANEOUS at 21:12

## 2022-03-31 RX ADMIN — GLIPIZIDE 10 MG: 10 TABLET ORAL at 18:54

## 2022-03-31 RX ADMIN — SODIUM CHLORIDE: 9 INJECTION, SOLUTION INTRAVENOUS at 09:51

## 2022-03-31 RX ADMIN — AMLODIPINE BESYLATE 5 MG: 5 TABLET ORAL at 18:54

## 2022-03-31 RX ADMIN — FAMOTIDINE 20 MG: 20 TABLET ORAL at 21:12

## 2022-03-31 RX ADMIN — ISOSORBIDE MONONITRATE 60 MG: 60 TABLET, EXTENDED RELEASE ORAL at 18:54

## 2022-03-31 RX ADMIN — HYDRALAZINE HYDROCHLORIDE 50 MG: 50 TABLET, FILM COATED ORAL at 21:12

## 2022-03-31 ASSESSMENT — LIFESTYLE VARIABLES
HOW OFTEN DO YOU HAVE A DRINK CONTAINING ALCOHOL: NEVER
ALCOHOL_USE_STATUS: NO OR LOW RISK WITH VALIDATED TOOL
AUDIT-C TOTAL SCORE: 0
HOW MANY STANDARD DRINKS CONTAINING ALCOHOL DO YOU HAVE ON A TYPICAL DAY: 0,1 OR 2
HOW OFTEN DO YOU HAVE 6 OR MORE DRINKS ON ONE OCCASION: NEVER

## 2022-03-31 ASSESSMENT — PAIN SCALES - GENERAL
PAINLEVEL_OUTOF10: 0

## 2022-03-31 ASSESSMENT — ACTIVITIES OF DAILY LIVING (ADL)
ADL_SCORE: 12
ADL_SHORT_OF_BREATH: NO
CHRONIC_PAIN_PRESENT: NO
ADL_BEFORE_ADMISSION: INDEPENDENT
RECENT_DECLINE_ADL: NO

## 2022-03-31 ASSESSMENT — COGNITIVE AND FUNCTIONAL STATUS - GENERAL
DO YOU HAVE SERIOUS DIFFICULTY WALKING OR CLIMBING STAIRS: NO
BECAUSE OF A PHYSICAL, MENTAL, OR EMOTIONAL CONDITION, DO YOU HAVE SERIOUS DIFFICULTY CONCENTRATING, REMEMBERING OR MAKING DECISIONS: NO
DO YOU HAVE DIFFICULTY DRESSING OR BATHING: NO
ARE YOU BLIND OR DO YOU HAVE SERIOUS DIFFICULTY SEEING, EVEN WHEN WEARING GLASSES: NO
ARE YOU DEAF OR DO YOU HAVE SERIOUS DIFFICULTY  HEARING: NO
BECAUSE OF A PHYSICAL, MENTAL, OR EMOTIONAL CONDITION, DO YOU HAVE DIFFICULTY DOING ERRANDS ALONE: NO

## 2022-04-01 VITALS
HEART RATE: 73 BPM | HEIGHT: 63 IN | DIASTOLIC BLOOD PRESSURE: 70 MMHG | TEMPERATURE: 97.7 F | OXYGEN SATURATION: 98 % | RESPIRATION RATE: 14 BRPM | WEIGHT: 173.72 LBS | BODY MASS INDEX: 30.78 KG/M2 | SYSTOLIC BLOOD PRESSURE: 148 MMHG

## 2022-04-01 LAB
ALBUMIN SERPL-MCNC: 2.8 G/DL (ref 3.6–5.1)
ALBUMIN/GLOB SERPL: 0.8 {RATIO} (ref 1–2.4)
ALP SERPL-CCNC: 74 UNITS/L (ref 45–117)
ALT SERPL-CCNC: 18 UNITS/L
ANION GAP SERPL CALC-SCNC: 10 MMOL/L (ref 10–20)
AST SERPL-CCNC: 17 UNITS/L
ATRIAL RATE (BPM): 71
ATRIAL RATE (BPM): 73
BASOPHILS # BLD: 0 K/MCL (ref 0–0.3)
BASOPHILS NFR BLD: 0 %
BILIRUB SERPL-MCNC: 0.3 MG/DL (ref 0.2–1)
BUN SERPL-MCNC: 18 MG/DL (ref 6–20)
BUN/CREAT SERPL: 17 (ref 7–25)
CALCIUM SERPL-MCNC: 8.8 MG/DL (ref 8.4–10.2)
CHLORIDE SERPL-SCNC: 108 MMOL/L (ref 98–107)
CHOLEST SERPL-MCNC: 148 MG/DL
CHOLEST/HDLC SERPL: 3.3 {RATIO}
CO2 SERPL-SCNC: 26 MMOL/L (ref 21–32)
CREAT SERPL-MCNC: 1.06 MG/DL (ref 0.51–0.95)
DEPRECATED RDW RBC: 44.4 FL (ref 39–50)
EOSINOPHIL # BLD: 0.2 K/MCL (ref 0–0.5)
EOSINOPHIL NFR BLD: 2 %
ERYTHROCYTE [DISTWIDTH] IN BLOOD: 13.9 % (ref 11–15)
FASTING DURATION TIME PATIENT: ABNORMAL H
FASTING DURATION TIME PATIENT: ABNORMAL H
GFR SERPLBLD BASED ON 1.73 SQ M-ARVRAT: 56 ML/MIN
GLOBULIN SER-MCNC: 3.6 G/DL (ref 2–4)
GLUCOSE BLDC GLUCOMTR-MCNC: 145 MG/DL (ref 70–99)
GLUCOSE SERPL-MCNC: 139 MG/DL (ref 70–99)
HBA1C MFR BLD: 8.9 % (ref 4.5–5.6)
HCT VFR BLD CALC: 36.8 % (ref 36–46.5)
HDLC SERPL-MCNC: 45 MG/DL
HGB BLD-MCNC: 11.7 G/DL (ref 12–15.5)
IMM GRANULOCYTES # BLD AUTO: 0 K/MCL (ref 0–0.2)
IMM GRANULOCYTES # BLD: 1 %
LDLC SERPL CALC-MCNC: 60 MG/DL
LYMPHOCYTES # BLD: 2.3 K/MCL (ref 1–4)
LYMPHOCYTES NFR BLD: 26 %
MAGNESIUM SERPL-MCNC: 2 MG/DL (ref 1.7–2.4)
MCH RBC QN AUTO: 27.7 PG (ref 26–34)
MCHC RBC AUTO-ENTMCNC: 31.8 G/DL (ref 32–36.5)
MCV RBC AUTO: 87 FL (ref 78–100)
MONOCYTES # BLD: 0.7 K/MCL (ref 0.3–0.9)
MONOCYTES NFR BLD: 8 %
NEUTROPHILS # BLD: 5.4 K/MCL (ref 1.8–7.7)
NEUTROPHILS NFR BLD: 63 %
NONHDLC SERPL-MCNC: 103 MG/DL
NRBC BLD MANUAL-RTO: 0 /100 WBC
P AXIS (DEGREES): 37
P AXIS (DEGREES): 40
PLATELET # BLD AUTO: 243 K/MCL (ref 140–450)
POTASSIUM SERPL-SCNC: 3.9 MMOL/L (ref 3.4–5.1)
PR-INTERVAL (MSEC): 188
PR-INTERVAL (MSEC): 200
PROT SERPL-MCNC: 6.4 G/DL (ref 6.4–8.2)
QRS-INTERVAL (MSEC): 82
QRS-INTERVAL (MSEC): 96
QT-INTERVAL (MSEC): 396
QT-INTERVAL (MSEC): 408
QTC: 436
QTC: 443
R AXIS (DEGREES): 42
R AXIS (DEGREES): 49
RBC # BLD: 4.23 MIL/MCL (ref 4–5.2)
REPORT TEXT: NORMAL
REPORT TEXT: NORMAL
SODIUM SERPL-SCNC: 140 MMOL/L (ref 135–145)
T AXIS (DEGREES): 119
T AXIS (DEGREES): 87
TRIGL SERPL-MCNC: 215 MG/DL
TSH SERPL-ACNC: 3.82 MCUNITS/ML (ref 0.35–5)
VENTRICULAR RATE EKG/MIN (BPM): 71
VENTRICULAR RATE EKG/MIN (BPM): 73
WBC # BLD: 8.6 K/MCL (ref 4.2–11)

## 2022-04-01 PROCEDURE — 36415 COLL VENOUS BLD VENIPUNCTURE: CPT | Performed by: NURSE PRACTITIONER

## 2022-04-01 PROCEDURE — 83735 ASSAY OF MAGNESIUM: CPT | Performed by: NURSE PRACTITIONER

## 2022-04-01 PROCEDURE — 10004651 HB RX, NO CHARGE ITEM: Performed by: SPECIALIST

## 2022-04-01 PROCEDURE — 85025 COMPLETE CBC W/AUTO DIFF WBC: CPT | Performed by: NURSE PRACTITIONER

## 2022-04-01 PROCEDURE — 84443 ASSAY THYROID STIM HORMONE: CPT | Performed by: NURSE PRACTITIONER

## 2022-04-01 PROCEDURE — 83036 HEMOGLOBIN GLYCOSYLATED A1C: CPT | Performed by: NURSE PRACTITIONER

## 2022-04-01 PROCEDURE — 80053 COMPREHEN METABOLIC PANEL: CPT | Performed by: NURSE PRACTITIONER

## 2022-04-01 PROCEDURE — 10004651 HB RX, NO CHARGE ITEM: Performed by: NURSE PRACTITIONER

## 2022-04-01 PROCEDURE — 10002800 HB RX 250 W HCPCS: Performed by: NURSE PRACTITIONER

## 2022-04-01 PROCEDURE — 80061 LIPID PANEL: CPT | Performed by: NURSE PRACTITIONER

## 2022-04-01 PROCEDURE — 10002803 HB RX 637: Performed by: NURSE PRACTITIONER

## 2022-04-01 PROCEDURE — 93005 ELECTROCARDIOGRAM TRACING: CPT | Performed by: SPECIALIST

## 2022-04-01 PROCEDURE — 10002803 HB RX 637: Performed by: SPECIALIST

## 2022-04-01 RX ORDER — POTASSIUM CHLORIDE 20 MEQ/1
40 TABLET, EXTENDED RELEASE ORAL ONCE
Status: COMPLETED | OUTPATIENT
Start: 2022-04-01 | End: 2022-04-01

## 2022-04-01 RX ORDER — ISOSORBIDE MONONITRATE 60 MG/1
60 TABLET, EXTENDED RELEASE ORAL DAILY
Qty: 30 TABLET | Refills: 0 | Status: SHIPPED | OUTPATIENT
Start: 2022-04-02 | End: 2022-11-29 | Stop reason: SDUPTHER

## 2022-04-01 RX ADMIN — ATORVASTATIN CALCIUM 20 MG: 20 TABLET, FILM COATED ORAL at 08:21

## 2022-04-01 RX ADMIN — ENOXAPARIN SODIUM 40 MG: 40 INJECTION SUBCUTANEOUS at 08:20

## 2022-04-01 RX ADMIN — GLIPIZIDE 10 MG: 10 TABLET ORAL at 08:22

## 2022-04-01 RX ADMIN — ASPIRIN 81 MG: 81 TABLET, COATED ORAL at 08:21

## 2022-04-01 RX ADMIN — ISOSORBIDE MONONITRATE 60 MG: 60 TABLET, EXTENDED RELEASE ORAL at 08:21

## 2022-04-01 RX ADMIN — FUROSEMIDE 80 MG: 40 TABLET ORAL at 08:21

## 2022-04-01 RX ADMIN — AMLODIPINE BESYLATE 5 MG: 5 TABLET ORAL at 08:22

## 2022-04-01 RX ADMIN — CARVEDILOL 25 MG: 25 TABLET, FILM COATED ORAL at 08:22

## 2022-04-01 RX ADMIN — POTASSIUM CHLORIDE 40 MEQ: 1500 TABLET, EXTENDED RELEASE ORAL at 08:21

## 2022-04-01 RX ADMIN — FAMOTIDINE 20 MG: 20 TABLET ORAL at 08:22

## 2022-04-01 RX ADMIN — HYDRALAZINE HYDROCHLORIDE 50 MG: 50 TABLET, FILM COATED ORAL at 08:21

## 2022-04-01 RX ADMIN — SODIUM CHLORIDE, PRESERVATIVE FREE 2 ML: 5 INJECTION INTRAVENOUS at 08:24

## 2022-04-01 RX ADMIN — ACETAMINOPHEN 650 MG: 325 TABLET ORAL at 11:27

## 2022-04-01 RX ADMIN — TICAGRELOR 90 MG: 90 TABLET ORAL at 08:21

## 2022-04-01 ASSESSMENT — COGNITIVE AND FUNCTIONAL STATUS - GENERAL
DO YOU HAVE DIFFICULTY DRESSING OR BATHING: NO
BECAUSE OF A PHYSICAL, MENTAL, OR EMOTIONAL CONDITION, DO YOU HAVE DIFFICULTY DOING ERRANDS ALONE: YES
BECAUSE OF A PHYSICAL, MENTAL, OR EMOTIONAL CONDITION, DO YOU HAVE SERIOUS DIFFICULTY CONCENTRATING, REMEMBERING OR MAKING DECISIONS: NO
DO YOU HAVE SERIOUS DIFFICULTY WALKING OR CLIMBING STAIRS: NO

## 2022-04-01 ASSESSMENT — PAIN SCALES - GENERAL: PAINLEVEL_OUTOF10: 0

## 2022-05-16 NOTE — PAT NURSING NOTE
Spoke with Anamaria Sultana at Dr. Kole Mccloud office re need for name correction on scheduling ticket.  She will verify and send corrected ticket to PAT office

## 2022-05-17 ENCOUNTER — LAB ENCOUNTER (OUTPATIENT)
Dept: LAB | Age: 62
End: 2022-05-17
Attending: OPHTHALMOLOGY
Payer: MEDICAID

## 2022-05-17 DIAGNOSIS — Z01.818 PRE-OP TESTING: ICD-10-CM

## 2022-05-17 RX ORDER — PANTOPRAZOLE SODIUM 40 MG/1
40 TABLET, DELAYED RELEASE ORAL
COMMUNITY

## 2022-05-17 RX ORDER — ACETAMINOPHEN 500 MG
500 TABLET ORAL EVERY 6 HOURS PRN
COMMUNITY

## 2022-05-17 RX ORDER — ASPIRIN 81 MG/1
81 TABLET ORAL EVERY MORNING
COMMUNITY

## 2022-05-17 RX ORDER — LEVOTHYROXINE SODIUM 0.03 MG/1
25 TABLET ORAL
COMMUNITY

## 2022-05-18 LAB — SARS-COV-2 RNA RESP QL NAA+PROBE: NOT DETECTED

## 2022-05-18 RX ORDER — GENTAMICIN SULFATE 3 MG/ML
2 SOLUTION/ DROPS OPHTHALMIC
Status: CANCELLED | OUTPATIENT
Start: 2022-05-18

## 2022-05-23 ENCOUNTER — PRIOR ORIGINAL RECORDS (OUTPATIENT)
Dept: HEALTH INFORMATION MANAGEMENT | Facility: OTHER | Age: 62
End: 2022-05-23

## 2022-06-07 ENCOUNTER — LAB ENCOUNTER (OUTPATIENT)
Dept: LAB | Age: 62
End: 2022-06-07
Attending: OPHTHALMOLOGY
Payer: MEDICAID

## 2022-06-07 DIAGNOSIS — Z01.818 PRE-OP TESTING: ICD-10-CM

## 2022-06-08 ENCOUNTER — ANESTHESIA EVENT (OUTPATIENT)
Dept: SURGERY | Facility: HOSPITAL | Age: 62
End: 2022-06-08
Payer: MEDICAID

## 2022-06-08 LAB — SARS-COV-2 RNA RESP QL NAA+PROBE: NOT DETECTED

## 2022-06-09 ENCOUNTER — ANESTHESIA (OUTPATIENT)
Dept: SURGERY | Facility: HOSPITAL | Age: 62
End: 2022-06-09
Payer: MEDICAID

## 2022-06-09 ENCOUNTER — HOSPITAL ENCOUNTER (OUTPATIENT)
Facility: HOSPITAL | Age: 62
Setting detail: HOSPITAL OUTPATIENT SURGERY
Discharge: HOME | End: 2022-06-09
Attending: OPHTHALMOLOGY | Admitting: OPHTHALMOLOGY
Payer: MEDICAID

## 2022-06-09 VITALS
SYSTOLIC BLOOD PRESSURE: 139 MMHG | RESPIRATION RATE: 15 BRPM | HEIGHT: 61 IN | BODY MASS INDEX: 33.42 KG/M2 | TEMPERATURE: 97 F | WEIGHT: 177 LBS | DIASTOLIC BLOOD PRESSURE: 65 MMHG | OXYGEN SATURATION: 97 % | HEART RATE: 64 BPM

## 2022-06-09 DIAGNOSIS — Z01.818 PRE-OP TESTING: Primary | ICD-10-CM

## 2022-06-09 LAB
GLUCOSE BLDC GLUCOMTR-MCNC: 203 MG/DL (ref 70–99)
GLUCOSE BLDC GLUCOMTR-MCNC: 55 MG/DL (ref 70–99)
GLUCOSE BLDC GLUCOMTR-MCNC: 71 MG/DL (ref 70–99)

## 2022-06-09 PROCEDURE — 08NF3ZZ RELEASE LEFT RETINA, PERCUTANEOUS APPROACH: ICD-10-PCS | Performed by: OPHTHALMOLOGY

## 2022-06-09 PROCEDURE — 8E09XBZ COMPUTER ASSISTED PROCEDURE OF HEAD AND NECK REGION: ICD-10-PCS | Performed by: OPHTHALMOLOGY

## 2022-06-09 PROCEDURE — 08B53ZZ EXCISION OF LEFT VITREOUS, PERCUTANEOUS APPROACH: ICD-10-PCS | Performed by: OPHTHALMOLOGY

## 2022-06-09 PROCEDURE — 82962 GLUCOSE BLOOD TEST: CPT

## 2022-06-09 RX ORDER — DEXTROSE MONOHYDRATE 25 G/50ML
50 INJECTION, SOLUTION INTRAVENOUS
Status: DISCONTINUED | OUTPATIENT
Start: 2022-06-09 | End: 2022-06-09

## 2022-06-09 RX ORDER — ATROPINE SULFATE 10 MG/ML
1 SOLUTION/ DROPS OPHTHALMIC ONCE
Status: COMPLETED | OUTPATIENT
Start: 2022-06-09 | End: 2022-06-09

## 2022-06-09 RX ORDER — SODIUM CHLORIDE, SODIUM LACTATE, POTASSIUM CHLORIDE, CALCIUM CHLORIDE 600; 310; 30; 20 MG/100ML; MG/100ML; MG/100ML; MG/100ML
INJECTION, SOLUTION INTRAVENOUS CONTINUOUS
Status: DISCONTINUED | OUTPATIENT
Start: 2022-06-09 | End: 2022-06-09

## 2022-06-09 RX ORDER — NICOTINE POLACRILEX 4 MG
30 LOZENGE BUCCAL
Status: DISCONTINUED | OUTPATIENT
Start: 2022-06-09 | End: 2022-06-09

## 2022-06-09 RX ORDER — MORPHINE SULFATE 4 MG/ML
2 INJECTION, SOLUTION INTRAMUSCULAR; INTRAVENOUS EVERY 10 MIN PRN
Status: DISCONTINUED | OUTPATIENT
Start: 2022-06-09 | End: 2022-06-09

## 2022-06-09 RX ORDER — DEXAMETHASONE SODIUM PHOSPHATE 4 MG/ML
VIAL (ML) INJECTION AS NEEDED
Status: DISCONTINUED | OUTPATIENT
Start: 2022-06-09 | End: 2022-06-09 | Stop reason: SURG

## 2022-06-09 RX ORDER — ATROPINE SULFATE 10 MG/ML
SOLUTION/ DROPS OPHTHALMIC AS NEEDED
Status: DISCONTINUED | OUTPATIENT
Start: 2022-06-09 | End: 2022-06-09 | Stop reason: HOSPADM

## 2022-06-09 RX ORDER — ONDANSETRON 2 MG/ML
INJECTION INTRAMUSCULAR; INTRAVENOUS AS NEEDED
Status: DISCONTINUED | OUTPATIENT
Start: 2022-06-09 | End: 2022-06-09 | Stop reason: SURG

## 2022-06-09 RX ORDER — METOPROLOL TARTRATE 5 MG/5ML
2.5 INJECTION INTRAVENOUS ONCE
Status: DISCONTINUED | OUTPATIENT
Start: 2022-06-09 | End: 2022-06-09

## 2022-06-09 RX ORDER — NICOTINE POLACRILEX 4 MG
15 LOZENGE BUCCAL
Status: DISCONTINUED | OUTPATIENT
Start: 2022-06-09 | End: 2022-06-09

## 2022-06-09 RX ORDER — LIDOCAINE HYDROCHLORIDE 10 MG/ML
INJECTION, SOLUTION EPIDURAL; INFILTRATION; INTRACAUDAL; PERINEURAL AS NEEDED
Status: DISCONTINUED | OUTPATIENT
Start: 2022-06-09 | End: 2022-06-09 | Stop reason: SURG

## 2022-06-09 RX ORDER — HYDROMORPHONE HYDROCHLORIDE 1 MG/ML
0.4 INJECTION, SOLUTION INTRAMUSCULAR; INTRAVENOUS; SUBCUTANEOUS EVERY 5 MIN PRN
Status: DISCONTINUED | OUTPATIENT
Start: 2022-06-09 | End: 2022-06-09

## 2022-06-09 RX ORDER — ATROPINE SULFATE 10 MG/ML
1 SOLUTION/ DROPS OPHTHALMIC
Status: COMPLETED | OUTPATIENT
Start: 2022-06-09 | End: 2022-06-09

## 2022-06-09 RX ORDER — BALANCED SALT SOLUTION 6.4; .75; .48; .3; 3.9; 1.7 MG/ML; MG/ML; MG/ML; MG/ML; MG/ML; MG/ML
SOLUTION OPHTHALMIC AS NEEDED
Status: DISCONTINUED | OUTPATIENT
Start: 2022-06-09 | End: 2022-06-09 | Stop reason: HOSPADM

## 2022-06-09 RX ORDER — HYDROMORPHONE HYDROCHLORIDE 1 MG/ML
0.6 INJECTION, SOLUTION INTRAMUSCULAR; INTRAVENOUS; SUBCUTANEOUS EVERY 5 MIN PRN
Status: DISCONTINUED | OUTPATIENT
Start: 2022-06-09 | End: 2022-06-09

## 2022-06-09 RX ORDER — NALOXONE HYDROCHLORIDE 0.4 MG/ML
80 INJECTION, SOLUTION INTRAMUSCULAR; INTRAVENOUS; SUBCUTANEOUS AS NEEDED
Status: DISCONTINUED | OUTPATIENT
Start: 2022-06-09 | End: 2022-06-09

## 2022-06-09 RX ORDER — METOCLOPRAMIDE 10 MG/1
10 TABLET ORAL ONCE
Status: DISCONTINUED | OUTPATIENT
Start: 2022-06-09 | End: 2022-06-09 | Stop reason: HOSPADM

## 2022-06-09 RX ORDER — PHENYLEPHRINE HCL 2.5 %
2 DROPS OPHTHALMIC (EYE)
Status: COMPLETED | OUTPATIENT
Start: 2022-06-09 | End: 2022-06-09

## 2022-06-09 RX ORDER — GENTAMICIN SULFATE 3 MG/ML
2 SOLUTION/ DROPS OPHTHALMIC
Status: COMPLETED | OUTPATIENT
Start: 2022-06-09 | End: 2022-06-09

## 2022-06-09 RX ORDER — ACETAMINOPHEN 500 MG
1000 TABLET ORAL ONCE
Status: DISCONTINUED | OUTPATIENT
Start: 2022-06-09 | End: 2022-06-09 | Stop reason: HOSPADM

## 2022-06-09 RX ORDER — FAMOTIDINE 20 MG/1
20 TABLET, FILM COATED ORAL ONCE
Status: COMPLETED | OUTPATIENT
Start: 2022-06-09 | End: 2022-06-09

## 2022-06-09 RX ORDER — GENTAMICIN SULFATE 3 MG/ML
1 SOLUTION/ DROPS OPHTHALMIC ONCE
Status: COMPLETED | OUTPATIENT
Start: 2022-06-09 | End: 2022-06-09

## 2022-06-09 RX ORDER — MORPHINE SULFATE 10 MG/ML
6 INJECTION, SOLUTION INTRAMUSCULAR; INTRAVENOUS EVERY 10 MIN PRN
Status: DISCONTINUED | OUTPATIENT
Start: 2022-06-09 | End: 2022-06-09

## 2022-06-09 RX ORDER — HYDROMORPHONE HYDROCHLORIDE 1 MG/ML
0.2 INJECTION, SOLUTION INTRAMUSCULAR; INTRAVENOUS; SUBCUTANEOUS EVERY 5 MIN PRN
Status: DISCONTINUED | OUTPATIENT
Start: 2022-06-09 | End: 2022-06-09

## 2022-06-09 RX ORDER — PHENYLEPHRINE HCL 2.5 %
1 DROPS OPHTHALMIC (EYE) ONCE
Status: COMPLETED | OUTPATIENT
Start: 2022-06-09 | End: 2022-06-09

## 2022-06-09 RX ORDER — MORPHINE SULFATE 4 MG/ML
4 INJECTION, SOLUTION INTRAMUSCULAR; INTRAVENOUS EVERY 10 MIN PRN
Status: DISCONTINUED | OUTPATIENT
Start: 2022-06-09 | End: 2022-06-09

## 2022-06-09 RX ADMIN — LIDOCAINE HYDROCHLORIDE 50 MG: 10 INJECTION, SOLUTION EPIDURAL; INFILTRATION; INTRACAUDAL; PERINEURAL at 07:38:00

## 2022-06-09 RX ADMIN — SODIUM CHLORIDE, SODIUM LACTATE, POTASSIUM CHLORIDE, CALCIUM CHLORIDE: 600; 310; 30; 20 INJECTION, SOLUTION INTRAVENOUS at 07:30:00

## 2022-06-09 RX ADMIN — DEXAMETHASONE SODIUM PHOSPHATE 4 MG: 4 MG/ML VIAL (ML) INJECTION at 08:25:00

## 2022-06-09 RX ADMIN — ONDANSETRON 4 MG: 2 INJECTION INTRAMUSCULAR; INTRAVENOUS at 08:29:00

## 2022-06-09 RX ADMIN — SODIUM CHLORIDE, SODIUM LACTATE, POTASSIUM CHLORIDE, CALCIUM CHLORIDE: 600; 310; 30; 20 INJECTION, SOLUTION INTRAVENOUS at 08:38:00

## 2022-06-09 NOTE — BRIEF OP NOTE
Pre-Operative Diagnosis: macular pucker, left eye     Post-Operative Diagnosis: macular pucker, left eye      Procedure Performed:   Pars Plana Vitrectomy, Membrane Peeling (ERM and ILM), laser Left Eye  Surgeon(s) and Role:     Nasim Brasher MD - Primary    Assistant(s):   None     Surgical Findings: Macular Pucker Left Eye     Specimen: None     Estimated Blood Loss: No data recorded    Dictation Number:      Anthony Aguilar MD, MD  6/9/2022  8:33 AM

## 2022-06-09 NOTE — ANESTHESIA PROCEDURE NOTES
Airway  Date/Time: 6/9/2022 7:40 AM  Urgency: Elective    Airway not difficult    General Information and Staff    Patient location during procedure: OR  Anesthesiologist: Robb Madrigal MD  Performed: anesthesiologist     Indications and Patient Condition  Indications for airway management: anesthesia  Spontaneous ventilation: present  Sedation level: deep  Preoxygenated: yes  Patient position: sniffing  Mask difficulty assessment: 1 - vent by mask    Final Airway Details  Final airway type: supraglottic airway      Successful airway: classic  Size 3      Number of attempts at approach: 1

## 2022-06-09 NOTE — INTERVAL H&P NOTE
Pre-op Diagnosis: macular pucker, left eye    The above referenced H&P was reviewed by Jacquelin Yancey MD, MD on 6/9/2022, the patient was examined and no significant changes have occurred in the patient's condition since the H&P was performed. I discussed with the patient and/or legal representative the potential benefits, risks and side effects of this procedure; the likelihood of the patient achieving goals; and potential problems that might occur during recuperation. I discussed reasonable alternatives to the procedure, including risks, benefits and side effects related to the alternatives and risks related to not receiving this procedure. We will proceed with procedure as planned.

## 2022-06-10 NOTE — OPERATIVE REPORT
[facility]    PATIENT'S NAME: Moe Walton   ATTENDING PHYSICIAN: Governor Cheadle. Faby Lenz MD   OPERATING PHYSICIAN: Governor Cheadle. Faby Lenz MD   PATIENT ACCOUNT#:   [id number]    LOCATION:  [address]  MEDICAL RECORD #:   [id number]       YOB: 1960  ADMISSION DATE:       06/09/2022      OPERATION DATE:  06/09/2022    OPERATIVE REPORT    #####EDITING MAY BE REQUIRED#####    PREOPERATIVE DIAGNOSIS:  Macular pucker left eye. POSTOPERATIVE DIAGNOSIS:  Macular pucker left eye. PROCEDURE:  Pars plana vitrectomy, membrane peeling (epiretinal membrane and internal limiting membrane), and laser photocoagulation left eye. ANESTHESIA:  General with LMA insertion plus retrobulbar block left eye. COMPLICATIONS:  None. INDICATIONS:  The patient is a 70-year-old female with a history of severe proliferative diabetic retinopathy both eyes, for which she has undergone pars plana vitrectomy surgery in each eye in 2016. Her most recent examination revealed a visual acuity of 20/40 right eye and 20/200 left eye. External and extraocular motility examinations were normal.  Slit lamp examination of the right eye revealed 2+ corneal guttae. There was focal iris atrophy temporally and nasally. A posterior chamber intractable lens was seen in good position with a clear posterior capsule. Slit lamp examination of the left eye revealed focal iris atrophy temporally. There was a well-positioned posterior chamber intractable lens with a clear posterior capsule. Her intraocular pressures measured 25 right eye and 19 left eye. Dilated fundus examination of the right eye revealed a cup-to-disc ratio of 0.03. There was an epiretinal membrane nasal to the optic disc. There was no disc neovascularization. The retinal arterials were severely attenuated a mild epiretinal membrane could be seen overlying the surface of the macula with mild associated macular edema inferonasally.   Panretinal photocoagulations currently could be seen throughout retinal periphery. There was no retinal neovascularization. The retina was attached for 360 degrees. There were no open retinal tears, holes, or breaks identified for 360 degrees. The vitreous cavity was clear. Dilated fundus examination in the left eye revealed a cup-to-disc ratio of 0.3. There was a moderate epiretinal membrane surrounding the optic disc. There was no active disc neovascularization. The retinal arterials were severely attenuated. A mild epiretinal membrane could be seen in the superonasal and inferotemporal portions of the macula. The macula was dragged inferiorly. Mild dot and blot hemorrhages could be seen within the posterior pole. A moderate to severe epiretinal membrane could be seen superior to the optic disc. Panretinal photocoagulation currently could be seen throughout the retinal periphery. The retina was attached for 360 degrees. There were no open retinal tears, holes, or breaks identified for 360 degrees. The vitreous cavity was clear. After a thorough discussion of the risks, benefits, and alternative treatment options, the first plane of vitrectomy with membrane peeling was advised for her left eye. The risks of surgery discussed included, but were not limited to, pain, infection, bleeding, redness, loss of vision, loss of the eye, need for further surgery, retinal detachment, glaucoma, corneal decompensation, ptosis, diplopia, crusting of the eyes, scarring, swelling, optic neuropathy, as well as anesthetic-related risks, such as death, myocardial infarction, stroke, pulmonary embolism, etc.  The very guarded visual and anatomic prognosis was stressed with the patient and her family. She appeared to understand the above discussion and wished to proceed with the planned surgery for her left eye.     OPERATIVE TECHNIQUE:  The patient is brought to the operating room where general anesthesia with LMA insertion was induced without difficulty done by the Department of Anesthesiology. A retrobulbar injection of a 50/50 mixture of 2% lidocaine and 0.75% Marcaine was given to the retrobulbar space of the left eye using a flat grind, 25-gauge needle. A total of 3 mL of this anesthetic was delivered to the left retrobulbar space to help control both intraoperative and postoperative pain. The left eye was then prepped and draped in the usual sterile manner. A heavy wire lid speculum was placed between the lids of the left eye. A caliper was used to measure 3.5 mm posterior to the corneoscleral limbus in the inferotemporal quadrant. A 25-gauge trocar was placed in a beveled fashion after appropriate displacement of the conjunctiva through this 3.5 mm josé. The trocar was directed into the vitreous cavity and connected to a 25-gauge infusion cannula. The position of the infusion cannula was identified within the vitreous cavity and found to be free of retinal tissue. The infusion was turned on. Two additional 25-gauge trocars were then placed, 1 in the superotemporal, 1 in the superonasal quadrant. Each of these trocars were placed 3.5 mm posterior to the corneoscleral limbus in a beveled fashion after appropriate displacement of the conjunctiva. Viscoat was placed over the cornea of the left eye. The microscope was positioned over the cornea of the left eye using the binocular indirect operating microscope attachment. A light pipe was inserted into the supratemporal sclerotomy site and the microdebrider into the superonasal sclerotomy site. With suction set between 50 and 600 mmHg and cutting set at 20,000 cuts per minute, an anterior vitrectomy was performed. Most of the vitreous had been previously removed. The Microvit and binocular indirect operating microscope attachment were removed. A plano disposable contact lens was placed over the cornea of the left eye using Viscoat as an interface.   A shelley-dusted membrane scraper was placed through the superonasal sclerotomy site. Membranes along the posterior pole were brushed with a membrane scraper. A small edge to the large preretinal membrane was created superior to the optic disc. The membrane scraper was removed and an internal limiting membrane forceps placed through the superonasal sclerotomy site. The edge of the membrane was grasped and a very large sheet of preretinal fibrosis removed superiorly. There was a taut attachment of the membrane to an underlying retinal vessel. It was felt that continued pulling of the membrane over this vessel would lead to rupture of the vessel and the forceps was removed. Curved horizontal scissors was then placed through the superonasal sclerotomy site and the scissors used to sharply dissect the membrane from the vessel. The scissors was removed and the internal limiting membrane forceps replaced through the superonasal sclerotomy site. The edge of the membrane was grasped and the membrane then was successfully peeled off the surface of the retina. A large sheet of preretinal fibrosis was successfully removed. Attention was then directed posteriorly. The internal limiting membrane forceps was used to grasp the internal limiting membrane along the inferotemporal arcade using a pinch and peel technique. A small elevation of the internal limiting membrane was created. Visualization was somewhat limited due to the small pupil size. The instruments were removed. Indocyanine green dye was then aspirated into the Microvit tubing. The Gomez Ignacia was then reinserted into the superonasal sclerotomy site and light pipe into the supratemporal sclerotomy site. The ICG dye was then actively refluxed into the vitreous cavity. The instruments were removed. Approximately 90 seconds were allowed to lapse for staining of the internal limiting membrane posteriorly.   The light pipe was then reinserted into the superotemporal sclerotomy site and Microvit into the superonasal sclerotomy site. The ICG dye was then evacuated from the vitreous cavity. The Byron Plascencia was removed and an internal limiting membrane forceps placed through the superonasal sclerotomy site. The now visible internal limiting membrane edge was grasped with the forceps and a large sheet of internal limiting membrane removed off the macula and posterior pole. The membrane was removed through roughly 1 disc macular distance temporal to the macula as well as from the superotemporal to the inferotemporal arcades. The instruments were then removed. The contact lens was removed and the binocular indirect operating microscope attachment replaced over the microscope. A light pipe was inserted into the superotemporal sclerotomy site and the Microvit into the superonasal sclerotomy site. The freed membranes were then removed from the vitreous cavity. There was an equivocal retinal hole along the superotemporal arcade. This could not be confirmed in careful observation. Decision was made to place lasers surrounding the hole. The Byron Plascencia was removed and another laser probe placed through the superonasal sclerotomy site. Sixteen spots of 532 nanometer laser energy were applied surrounding the questionable retinal break. The ______  further treatment included 250 milliwatts of energy in 0.15 seconds timing. The instruments were then removed. The superior trocars were removed and a cotton-tipped applicator was placed over each of the superior sclerotomy sites for a period of approximately 10 seconds. The wounds were checked and found to be leaking mildly. They were closed with a single transconjunctival 6-0 plain suture through each of the sclerotomy sites. The sutures were tightened and tied permanently. The ends were trimmed short. The wounds were checked and found to be watertight.   The infusion cannula and inferotemporal trocar were removed and a cotton-tipped applicator was placed over the inferotemporal sclerotomy site. This also was found to be leaking mildly and an additional 6-0 plain transconjunctival suture passed through the inferotemporal sclerotomy site. The suture was tightened and tied permanently. The ends were trimmed short. The intraocular pressure was checked and felt to be appropriate. Atropine 1% drops were placed over the cornea of the left eye. TobraDex ointment was placed over the cornea of the left eye. The lid speculum was removed and the eyelids closed. An eye patch and Haines shield were placed over the left eye. The patient was awakened from anesthesia and brought to the recovery room in excellent condition after having tolerated the procedure well. There were no complications. Dictated By Nelsy Gar MD  d: 06/09/2022 08:46:22  t: 06/09/2022 18:40:01  [facility] [id number]  ABW/

## 2022-06-24 ENCOUNTER — OFFICE VISIT (OUTPATIENT)
Dept: CARDIOLOGY | Age: 62
End: 2022-06-24

## 2022-06-24 VITALS
BODY MASS INDEX: 32.38 KG/M2 | SYSTOLIC BLOOD PRESSURE: 135 MMHG | WEIGHT: 160.6 LBS | DIASTOLIC BLOOD PRESSURE: 55 MMHG | HEIGHT: 59 IN | HEART RATE: 64 BPM

## 2022-06-24 DIAGNOSIS — E11.9 TYPE 2 DIABETES MELLITUS WITHOUT COMPLICATION, WITHOUT LONG-TERM CURRENT USE OF INSULIN (CMD): ICD-10-CM

## 2022-06-24 DIAGNOSIS — I25.10 CORONARY ARTERY DISEASE INVOLVING NATIVE HEART WITHOUT ANGINA PECTORIS, UNSPECIFIED VESSEL OR LESION TYPE: ICD-10-CM

## 2022-06-24 DIAGNOSIS — N18.9 CHRONIC KIDNEY DISEASE, UNSPECIFIED CKD STAGE: ICD-10-CM

## 2022-06-24 DIAGNOSIS — R60.0 LEG EDEMA: ICD-10-CM

## 2022-06-24 DIAGNOSIS — Z95.1 S/P CABG (CORONARY ARTERY BYPASS GRAFT): ICD-10-CM

## 2022-06-24 DIAGNOSIS — I10 HYPERTENSION, UNSPECIFIED TYPE: Primary | ICD-10-CM

## 2022-06-24 DIAGNOSIS — E78.5 DYSLIPIDEMIA: ICD-10-CM

## 2022-06-24 PROCEDURE — 99214 OFFICE O/P EST MOD 30 MIN: CPT | Performed by: SPECIALIST

## 2022-06-24 PROCEDURE — 3078F DIAST BP <80 MM HG: CPT | Performed by: SPECIALIST

## 2022-06-24 PROCEDURE — 3075F SYST BP GE 130 - 139MM HG: CPT | Performed by: SPECIALIST

## 2022-06-24 RX ORDER — LEVOTHYROXINE SODIUM 0.03 MG/1
50 TABLET ORAL
COMMUNITY

## 2022-06-24 RX ORDER — FUROSEMIDE 40 MG/1
40 TABLET ORAL DAILY
Qty: 30 TABLET | Refills: 3 | Status: SHIPPED | OUTPATIENT
Start: 2022-06-24

## 2022-06-24 RX ORDER — PANTOPRAZOLE SODIUM 40 MG/1
40 TABLET, DELAYED RELEASE ORAL DAILY
COMMUNITY
Start: 2022-06-01

## 2022-06-24 RX ORDER — METOCLOPRAMIDE 10 MG/1
10 TABLET ORAL 3 TIMES DAILY
COMMUNITY
End: 2022-08-05 | Stop reason: SDUPTHER

## 2022-06-24 RX ORDER — AMLODIPINE BESYLATE 10 MG/1
10 TABLET ORAL DAILY
Qty: 30 TABLET | Refills: 5 | Status: SHIPPED | OUTPATIENT
Start: 2022-06-24 | End: 2023-03-10

## 2022-06-24 RX ORDER — AMLODIPINE BESYLATE 10 MG/1
10 TABLET ORAL EVERY MORNING
COMMUNITY
End: 2022-06-24 | Stop reason: DRUGHIGH

## 2022-06-24 SDOH — HEALTH STABILITY: PHYSICAL HEALTH: ON AVERAGE, HOW MANY DAYS PER WEEK DO YOU ENGAGE IN MODERATE TO STRENUOUS EXERCISE (LIKE A BRISK WALK)?: 0 DAYS

## 2022-06-24 SDOH — HEALTH STABILITY: PHYSICAL HEALTH: ON AVERAGE, HOW MANY MINUTES DO YOU ENGAGE IN EXERCISE AT THIS LEVEL?: 0 MIN

## 2022-06-24 ASSESSMENT — PATIENT HEALTH QUESTIONNAIRE - PHQ9
1. LITTLE INTEREST OR PLEASURE IN DOING THINGS: NOT AT ALL
SUM OF ALL RESPONSES TO PHQ9 QUESTIONS 1 AND 2: 0
2. FEELING DOWN, DEPRESSED OR HOPELESS: NOT AT ALL
SUM OF ALL RESPONSES TO PHQ9 QUESTIONS 1 AND 2: 0
CLINICAL INTERPRETATION OF PHQ2 SCORE: NO FURTHER SCREENING NEEDED

## 2022-08-02 SDOH — HEALTH STABILITY: PHYSICAL HEALTH: ON AVERAGE, HOW MANY MINUTES DO YOU ENGAGE IN EXERCISE AT THIS LEVEL?: 20 MIN

## 2022-08-02 SDOH — HEALTH STABILITY: PHYSICAL HEALTH: ON AVERAGE, HOW MANY DAYS PER WEEK DO YOU ENGAGE IN MODERATE TO STRENUOUS EXERCISE (LIKE A BRISK WALK)?: 4 DAYS

## 2022-08-02 ASSESSMENT — PATIENT HEALTH QUESTIONNAIRE - PHQ9
SUM OF ALL RESPONSES TO PHQ9 QUESTIONS 1 AND 2: 0
1. LITTLE INTEREST OR PLEASURE IN DOING THINGS: NOT AT ALL
CLINICAL INTERPRETATION OF PHQ2 SCORE: NO FURTHER SCREENING NEEDED
2. FEELING DOWN, DEPRESSED OR HOPELESS: NOT AT ALL
SUM OF ALL RESPONSES TO PHQ9 QUESTIONS 1 AND 2: 0

## 2022-08-05 ENCOUNTER — OFFICE VISIT (OUTPATIENT)
Dept: CARDIOLOGY | Age: 62
End: 2022-08-05

## 2022-08-05 VITALS
DIASTOLIC BLOOD PRESSURE: 60 MMHG | BODY MASS INDEX: 34.53 KG/M2 | HEIGHT: 59 IN | WEIGHT: 171.3 LBS | SYSTOLIC BLOOD PRESSURE: 108 MMHG | HEART RATE: 70 BPM

## 2022-08-05 DIAGNOSIS — I25.10 CORONARY ARTERY DISEASE INVOLVING NATIVE HEART WITHOUT ANGINA PECTORIS, UNSPECIFIED VESSEL OR LESION TYPE: Primary | ICD-10-CM

## 2022-08-05 DIAGNOSIS — E78.5 DYSLIPIDEMIA: ICD-10-CM

## 2022-08-05 DIAGNOSIS — R60.0 LOWER LEG EDEMA: ICD-10-CM

## 2022-08-05 DIAGNOSIS — I10 HTN (HYPERTENSION), BENIGN: ICD-10-CM

## 2022-08-05 DIAGNOSIS — N18.9 CHRONIC KIDNEY DISEASE, UNSPECIFIED CKD STAGE: ICD-10-CM

## 2022-08-05 PROCEDURE — 3074F SYST BP LT 130 MM HG: CPT | Performed by: SPECIALIST

## 2022-08-05 PROCEDURE — 99214 OFFICE O/P EST MOD 30 MIN: CPT | Performed by: SPECIALIST

## 2022-08-05 PROCEDURE — 3078F DIAST BP <80 MM HG: CPT | Performed by: SPECIALIST

## 2022-08-05 RX ORDER — METOCLOPRAMIDE 10 MG/1
5 TABLET ORAL 3 TIMES DAILY
Qty: 90 TABLET | Refills: 3 | Status: SHIPPED | OUTPATIENT
Start: 2022-08-05

## 2022-08-05 RX ORDER — ATORVASTATIN CALCIUM 20 MG/1
20 TABLET, FILM COATED ORAL DAILY
Qty: 90 TABLET | Refills: 3 | Status: SHIPPED | OUTPATIENT
Start: 2022-08-05

## 2022-08-11 ENCOUNTER — APPOINTMENT (OUTPATIENT)
Dept: MRI IMAGING | Facility: HOSPITAL | Age: 62
End: 2022-08-11
Attending: HOSPITALIST
Payer: MEDICAID

## 2022-08-11 ENCOUNTER — APPOINTMENT (OUTPATIENT)
Dept: ULTRASOUND IMAGING | Age: 62
End: 2022-08-11
Attending: PHYSICIAN ASSISTANT
Payer: MEDICAID

## 2022-08-11 ENCOUNTER — HOSPITAL ENCOUNTER (INPATIENT)
Facility: HOSPITAL | Age: 62
LOS: 5 days | Discharge: HOME | End: 2022-08-16
Attending: EMERGENCY MEDICINE | Admitting: HOSPITALIST
Payer: MEDICAID

## 2022-08-11 ENCOUNTER — APPOINTMENT (OUTPATIENT)
Dept: GENERAL RADIOLOGY | Age: 62
End: 2022-08-11
Attending: PHYSICIAN ASSISTANT
Payer: MEDICAID

## 2022-08-11 ENCOUNTER — APPOINTMENT (OUTPATIENT)
Dept: GENERAL RADIOLOGY | Facility: HOSPITAL | Age: 62
End: 2022-08-11
Attending: HOSPITALIST
Payer: MEDICAID

## 2022-08-11 ENCOUNTER — HOSPITAL ENCOUNTER (INPATIENT)
Facility: HOSPITAL | Age: 62
LOS: 5 days | Discharge: HOME HEALTH | End: 2022-08-16
Attending: EMERGENCY MEDICINE | Admitting: HOSPITALIST
Payer: MEDICAID

## 2022-08-11 DIAGNOSIS — L08.9 DIABETIC FOOT INFECTION (HCC): Primary | ICD-10-CM

## 2022-08-11 DIAGNOSIS — E11.628 DIABETIC FOOT INFECTION (HCC): Primary | ICD-10-CM

## 2022-08-11 DIAGNOSIS — L03.116 LEFT LEG CELLULITIS: ICD-10-CM

## 2022-08-11 PROBLEM — I10 PRIMARY HYPERTENSION: Status: ACTIVE | Noted: 2019-12-11

## 2022-08-11 LAB
ALBUMIN SERPL-MCNC: 3 G/DL (ref 3.4–5)
ALBUMIN/GLOB SERPL: 0.7 {RATIO} (ref 1–2)
ALP LIVER SERPL-CCNC: 79 U/L
ALT SERPL-CCNC: 22 U/L
ANION GAP SERPL CALC-SCNC: 7 MMOL/L (ref 0–18)
AST SERPL-CCNC: 48 U/L (ref 15–37)
BASOPHILS # BLD AUTO: 0.04 X10(3) UL (ref 0–0.2)
BASOPHILS NFR BLD AUTO: 0.2 %
BILIRUB SERPL-MCNC: 0.6 MG/DL (ref 0.1–2)
BILIRUB UR QL STRIP.AUTO: NEGATIVE
BUN BLD-MCNC: 17 MG/DL (ref 7–18)
CALCIUM BLD-MCNC: 9.2 MG/DL (ref 8.5–10.1)
CHLORIDE SERPL-SCNC: 101 MMOL/L (ref 98–112)
CLARITY UR REFRACT.AUTO: CLEAR
CO2 SERPL-SCNC: 28 MMOL/L (ref 21–32)
COLOR UR AUTO: YELLOW
CREAT BLD-MCNC: 1.23 MG/DL
EOSINOPHIL # BLD AUTO: 0.16 X10(3) UL (ref 0–0.7)
EOSINOPHIL NFR BLD AUTO: 0.6 %
ERYTHROCYTE [DISTWIDTH] IN BLOOD BY AUTOMATED COUNT: 13.3 %
EST. AVERAGE GLUCOSE BLD GHB EST-MCNC: 223 MG/DL (ref 68–126)
GFR SERPLBLD BASED ON 1.73 SQ M-ARVRAT: 49 ML/MIN/1.73M2 (ref 60–?)
GLOBULIN PLAS-MCNC: 4.6 G/DL (ref 2.8–4.4)
GLUCOSE BLD-MCNC: 102 MG/DL (ref 70–99)
GLUCOSE BLD-MCNC: 124 MG/DL (ref 70–99)
GLUCOSE BLD-MCNC: 138 MG/DL (ref 70–99)
GLUCOSE BLD-MCNC: 199 MG/DL (ref 70–99)
GLUCOSE UR STRIP.AUTO-MCNC: NEGATIVE MG/DL
HBA1C MFR BLD: 9.4 % (ref ?–5.7)
HCT VFR BLD AUTO: 37.4 %
HGB BLD-MCNC: 12.1 G/DL
IMM GRANULOCYTES # BLD AUTO: 0.25 X10(3) UL (ref 0–1)
IMM GRANULOCYTES NFR BLD: 1 %
INR BLD: 1.23 (ref 0.85–1.16)
KETONES UR STRIP.AUTO-MCNC: NEGATIVE MG/DL
LACTATE SERPL-SCNC: 1 MMOL/L (ref 0.4–2)
LEUKOCYTE ESTERASE UR QL STRIP.AUTO: NEGATIVE
LYMPHOCYTES # BLD AUTO: 1.05 X10(3) UL (ref 1–4)
LYMPHOCYTES NFR BLD AUTO: 4.2 %
MCH RBC QN AUTO: 27 PG (ref 26–34)
MCHC RBC AUTO-ENTMCNC: 32.4 G/DL (ref 31–37)
MCV RBC AUTO: 83.5 FL
MONOCYTES # BLD AUTO: 0.66 X10(3) UL (ref 0.1–1)
MONOCYTES NFR BLD AUTO: 2.6 %
NEUTROPHILS # BLD AUTO: 23.13 X10 (3) UL (ref 1.5–7.7)
NEUTROPHILS # BLD AUTO: 23.13 X10(3) UL (ref 1.5–7.7)
NEUTROPHILS NFR BLD AUTO: 91.4 %
NITRITE UR QL STRIP.AUTO: NEGATIVE
OSMOLALITY SERPL CALC.SUM OF ELEC: 285 MOSM/KG (ref 275–295)
PH UR STRIP.AUTO: 6.5 [PH] (ref 5–8)
PLATELET # BLD AUTO: 282 10(3)UL (ref 150–450)
POTASSIUM SERPL-SCNC: 3.8 MMOL/L (ref 3.5–5.1)
PROT SERPL-MCNC: 7.6 G/DL (ref 6.4–8.2)
PROTHROMBIN TIME: 15.5 SECONDS (ref 11.6–14.8)
RBC # BLD AUTO: 4.48 X10(6)UL
SARS-COV-2 RNA RESP QL NAA+PROBE: NOT DETECTED
SODIUM SERPL-SCNC: 136 MMOL/L (ref 136–145)
SP GR UR STRIP.AUTO: 1.01 (ref 1–1.03)
UROBILINOGEN UR STRIP.AUTO-MCNC: 0.2 MG/DL
WBC # BLD AUTO: 25.3 X10(3) UL (ref 4–11)

## 2022-08-11 PROCEDURE — 87147 CULTURE TYPE IMMUNOLOGIC: CPT | Performed by: EMERGENCY MEDICINE

## 2022-08-11 PROCEDURE — 83605 ASSAY OF LACTIC ACID: CPT | Performed by: PHYSICIAN ASSISTANT

## 2022-08-11 PROCEDURE — 96365 THER/PROPH/DIAG IV INF INIT: CPT

## 2022-08-11 PROCEDURE — 85025 COMPLETE CBC W/AUTO DIFF WBC: CPT | Performed by: PHYSICIAN ASSISTANT

## 2022-08-11 PROCEDURE — 83036 HEMOGLOBIN GLYCOSYLATED A1C: CPT | Performed by: HOSPITALIST

## 2022-08-11 PROCEDURE — 81015 MICROSCOPIC EXAM OF URINE: CPT | Performed by: EMERGENCY MEDICINE

## 2022-08-11 PROCEDURE — 81001 URINALYSIS AUTO W/SCOPE: CPT | Performed by: EMERGENCY MEDICINE

## 2022-08-11 PROCEDURE — 87205 SMEAR GRAM STAIN: CPT | Performed by: EMERGENCY MEDICINE

## 2022-08-11 PROCEDURE — 80053 COMPREHEN METABOLIC PANEL: CPT | Performed by: PHYSICIAN ASSISTANT

## 2022-08-11 PROCEDURE — 36415 COLL VENOUS BLD VENIPUNCTURE: CPT

## 2022-08-11 PROCEDURE — 99285 EMERGENCY DEPT VISIT HI MDM: CPT

## 2022-08-11 PROCEDURE — 82962 GLUCOSE BLOOD TEST: CPT

## 2022-08-11 PROCEDURE — 87070 CULTURE OTHR SPECIMN AEROBIC: CPT | Performed by: EMERGENCY MEDICINE

## 2022-08-11 PROCEDURE — 85610 PROTHROMBIN TIME: CPT | Performed by: HOSPITALIST

## 2022-08-11 PROCEDURE — A9575 INJ GADOTERATE MEGLUMI 0.1ML: HCPCS | Performed by: HOSPITALIST

## 2022-08-11 PROCEDURE — 73723 MRI JOINT LWR EXTR W/O&W/DYE: CPT | Performed by: HOSPITALIST

## 2022-08-11 PROCEDURE — 71045 X-RAY EXAM CHEST 1 VIEW: CPT | Performed by: HOSPITALIST

## 2022-08-11 PROCEDURE — 73630 X-RAY EXAM OF FOOT: CPT | Performed by: PHYSICIAN ASSISTANT

## 2022-08-11 PROCEDURE — 93971 EXTREMITY STUDY: CPT | Performed by: PHYSICIAN ASSISTANT

## 2022-08-11 PROCEDURE — 96375 TX/PRO/DX INJ NEW DRUG ADDON: CPT

## 2022-08-11 PROCEDURE — 87040 BLOOD CULTURE FOR BACTERIA: CPT | Performed by: PHYSICIAN ASSISTANT

## 2022-08-11 RX ORDER — HYDROCODONE BITARTRATE AND ACETAMINOPHEN 5; 325 MG/1; MG/1
1 TABLET ORAL ONCE
Status: DISCONTINUED | OUTPATIENT
Start: 2022-08-11 | End: 2022-08-11

## 2022-08-11 RX ORDER — ENOXAPARIN SODIUM 100 MG/ML
40 INJECTION SUBCUTANEOUS DAILY
Status: DISCONTINUED | OUTPATIENT
Start: 2022-08-11 | End: 2022-08-16

## 2022-08-11 RX ORDER — ACETAMINOPHEN 500 MG
500 TABLET ORAL EVERY 6 HOURS PRN
Status: DISCONTINUED | OUTPATIENT
Start: 2022-08-11 | End: 2022-08-16

## 2022-08-11 RX ORDER — ACETAMINOPHEN 500 MG
1000 TABLET ORAL ONCE
Status: COMPLETED | OUTPATIENT
Start: 2022-08-11 | End: 2022-08-11

## 2022-08-11 RX ORDER — HYDRALAZINE HYDROCHLORIDE 25 MG/1
25 TABLET, FILM COATED ORAL 2 TIMES DAILY
Status: DISCONTINUED | OUTPATIENT
Start: 2022-08-11 | End: 2022-08-16

## 2022-08-11 RX ORDER — DEXTROSE MONOHYDRATE 25 G/50ML
50 INJECTION, SOLUTION INTRAVENOUS
Status: DISCONTINUED | OUTPATIENT
Start: 2022-08-11 | End: 2022-08-16

## 2022-08-11 RX ORDER — CARVEDILOL 12.5 MG/1
25 TABLET ORAL 2 TIMES DAILY WITH MEALS
Status: DISCONTINUED | OUTPATIENT
Start: 2022-08-11 | End: 2022-08-16

## 2022-08-11 RX ORDER — POTASSIUM CHLORIDE 1.5 G/1.77G
40 POWDER, FOR SOLUTION ORAL ONCE
Status: COMPLETED | OUTPATIENT
Start: 2022-08-11 | End: 2022-08-11

## 2022-08-11 RX ORDER — MORPHINE SULFATE 4 MG/ML
4 INJECTION, SOLUTION INTRAMUSCULAR; INTRAVENOUS ONCE
Status: COMPLETED | OUTPATIENT
Start: 2022-08-11 | End: 2022-08-11

## 2022-08-11 RX ORDER — NICOTINE POLACRILEX 4 MG
15 LOZENGE BUCCAL
Status: DISCONTINUED | OUTPATIENT
Start: 2022-08-11 | End: 2022-08-16

## 2022-08-11 RX ORDER — PANTOPRAZOLE SODIUM 40 MG/1
40 TABLET, DELAYED RELEASE ORAL
Status: DISCONTINUED | OUTPATIENT
Start: 2022-08-12 | End: 2022-08-16

## 2022-08-11 RX ORDER — NICOTINE POLACRILEX 4 MG
30 LOZENGE BUCCAL
Status: DISCONTINUED | OUTPATIENT
Start: 2022-08-11 | End: 2022-08-16

## 2022-08-11 RX ORDER — SODIUM CHLORIDE 9 MG/ML
INJECTION, SOLUTION INTRAVENOUS CONTINUOUS
Status: ACTIVE | OUTPATIENT
Start: 2022-08-11 | End: 2022-08-12

## 2022-08-11 RX ORDER — ASPIRIN 81 MG/1
81 TABLET ORAL EVERY MORNING
Status: DISCONTINUED | OUTPATIENT
Start: 2022-08-11 | End: 2022-08-16

## 2022-08-11 RX ORDER — ATORVASTATIN CALCIUM 20 MG/1
20 TABLET, FILM COATED ORAL NIGHTLY
Status: DISCONTINUED | OUTPATIENT
Start: 2022-08-11 | End: 2022-08-16

## 2022-08-11 RX ORDER — METOCLOPRAMIDE 10 MG/1
10 TABLET ORAL
Status: DISCONTINUED | OUTPATIENT
Start: 2022-08-11 | End: 2022-08-16

## 2022-08-11 RX ORDER — ALBUTEROL SULFATE 90 UG/1
2 AEROSOL, METERED RESPIRATORY (INHALATION) 4 TIMES DAILY PRN
Status: DISCONTINUED | OUTPATIENT
Start: 2022-08-11 | End: 2022-08-16

## 2022-08-11 RX ORDER — LEVOTHYROXINE SODIUM 0.03 MG/1
25 TABLET ORAL
Status: DISCONTINUED | OUTPATIENT
Start: 2022-08-11 | End: 2022-08-16

## 2022-08-11 NOTE — ED QUICK NOTES
Orders for admission, patient is aware of plan and ready to go upstairs. Any questions, please call ED RN Mitchell Cope  at extension [phone]. Vaccinated?no  Type of COVID test sent:Rapid  COVID Suspicion level: Low      Titratable drug(s) infusin.9ns  Rate:bolus    LOC at time of transport:alert and oriented    Other pertinent information:  Pt understands some english but does not speak [place]. Speaks Divehi. Son is helpful. Redness extends up L inner thigh.   CIWA score=na  NIH score=na

## 2022-08-11 NOTE — PLAN OF CARE
Pt alert and oriented x4. Speaks Thai, pt requested son to translate for this RN. VS stable, currently afebrile. Tele NSR. Satting well on room air. Lovenox subcutaneous. Poor appetite, denies N/V. Encouraged increased PO intake. Accucheck. IVF infusing per order. Pitting edema to BLE, LLE > RLE. Gauze and kerlix roll to left foot, CDI. Podiatry MD paged with new consult, to see patient tomorrow 8/12. MRI to be done, screening form complete. Pt and son updated to plan of care, all questions answered. Instructed to call for assistance, call light within reach. Fall precautions in place.

## 2022-08-11 NOTE — PROGRESS NOTES
Pt arrived to unit from [facility] via [facility]. Pt oriented to room. VS stable on 2L O2 via NC. Temp 100.2 F. Denies pain. PEE REBOLLEDO made aware of pt's arrival. Instructed to call for assistance, call light within teach.

## 2022-08-12 ENCOUNTER — APPOINTMENT (OUTPATIENT)
Dept: ULTRASOUND IMAGING | Facility: HOSPITAL | Age: 62
End: 2022-08-12
Attending: HOSPITALIST
Payer: MEDICAID

## 2022-08-12 ENCOUNTER — APPOINTMENT (OUTPATIENT)
Dept: CV DIAGNOSTICS | Facility: HOSPITAL | Age: 62
End: 2022-08-12
Attending: INTERNAL MEDICINE
Payer: MEDICAID

## 2022-08-12 LAB
ALBUMIN SERPL-MCNC: 2.5 G/DL (ref 3.4–5)
ALBUMIN/GLOB SERPL: 0.6 {RATIO} (ref 1–2)
ALP LIVER SERPL-CCNC: 76 U/L
ALT SERPL-CCNC: 28 U/L
ANION GAP SERPL CALC-SCNC: 4 MMOL/L (ref 0–18)
AST SERPL-CCNC: 70 U/L (ref 15–37)
BASOPHILS # BLD AUTO: 0.06 X10(3) UL (ref 0–0.2)
BASOPHILS NFR BLD AUTO: 0.3 %
BILIRUB SERPL-MCNC: 0.5 MG/DL (ref 0.1–2)
BUN BLD-MCNC: 23 MG/DL (ref 7–18)
CALCIUM BLD-MCNC: 8.7 MG/DL (ref 8.5–10.1)
CHLORIDE SERPL-SCNC: 106 MMOL/L (ref 98–112)
CO2 SERPL-SCNC: 26 MMOL/L (ref 21–32)
CREAT BLD-MCNC: 1.18 MG/DL
CRP SERPL-MCNC: 22.5 MG/DL (ref ?–0.3)
EOSINOPHIL # BLD AUTO: 0.01 X10(3) UL (ref 0–0.7)
EOSINOPHIL NFR BLD AUTO: 0.1 %
ERYTHROCYTE [DISTWIDTH] IN BLOOD BY AUTOMATED COUNT: 13.3 %
ERYTHROCYTE [SEDIMENTATION RATE] IN BLOOD: 79 MM/HR
GFR SERPLBLD BASED ON 1.73 SQ M-ARVRAT: 52 ML/MIN/1.73M2 (ref 60–?)
GLOBULIN PLAS-MCNC: 4.1 G/DL (ref 2.8–4.4)
GLUCOSE BLD-MCNC: 140 MG/DL (ref 70–99)
GLUCOSE BLD-MCNC: 148 MG/DL (ref 70–99)
GLUCOSE BLD-MCNC: 226 MG/DL (ref 70–99)
GLUCOSE BLD-MCNC: 250 MG/DL (ref 70–99)
GLUCOSE BLD-MCNC: 299 MG/DL (ref 70–99)
GLUCOSE BLD-MCNC: 329 MG/DL (ref 70–99)
HCT VFR BLD AUTO: 36.2 %
HGB BLD-MCNC: 11.3 G/DL
IMM GRANULOCYTES # BLD AUTO: 0.09 X10(3) UL (ref 0–1)
IMM GRANULOCYTES NFR BLD: 0.5 %
LYMPHOCYTES # BLD AUTO: 1.51 X10(3) UL (ref 1–4)
LYMPHOCYTES NFR BLD AUTO: 8.6 %
MCH RBC QN AUTO: 27.2 PG (ref 26–34)
MCHC RBC AUTO-ENTMCNC: 31.2 G/DL (ref 31–37)
MCV RBC AUTO: 87.2 FL
MONOCYTES # BLD AUTO: 0.81 X10(3) UL (ref 0.1–1)
MONOCYTES NFR BLD AUTO: 4.6 %
NEUTROPHILS # BLD AUTO: 15.06 X10 (3) UL (ref 1.5–7.7)
NEUTROPHILS # BLD AUTO: 15.06 X10(3) UL (ref 1.5–7.7)
NEUTROPHILS NFR BLD AUTO: 85.9 %
OSMOLALITY SERPL CALC.SUM OF ELEC: 288 MOSM/KG (ref 275–295)
PLATELET # BLD AUTO: 219 10(3)UL (ref 150–450)
POTASSIUM SERPL-SCNC: 4.3 MMOL/L (ref 3.5–5.1)
PROT SERPL-MCNC: 6.6 G/DL (ref 6.4–8.2)
RBC # BLD AUTO: 4.15 X10(6)UL
SODIUM SERPL-SCNC: 136 MMOL/L (ref 136–145)
VANCOMYCIN PEAK SERPL-MCNC: 27.3 UG/ML (ref 30–50)
WBC # BLD AUTO: 17.5 X10(3) UL (ref 4–11)

## 2022-08-12 PROCEDURE — 87147 CULTURE TYPE IMMUNOLOGIC: CPT | Performed by: STUDENT IN AN ORGANIZED HEALTH CARE EDUCATION/TRAINING PROGRAM

## 2022-08-12 PROCEDURE — 86140 C-REACTIVE PROTEIN: CPT | Performed by: INTERNAL MEDICINE

## 2022-08-12 PROCEDURE — 93306 TTE W/DOPPLER COMPLETE: CPT | Performed by: INTERNAL MEDICINE

## 2022-08-12 PROCEDURE — 97161 PT EVAL LOW COMPLEX 20 MIN: CPT

## 2022-08-12 PROCEDURE — 97535 SELF CARE MNGMENT TRAINING: CPT

## 2022-08-12 PROCEDURE — 80202 ASSAY OF VANCOMYCIN: CPT | Performed by: INTERNAL MEDICINE

## 2022-08-12 PROCEDURE — 97165 OT EVAL LOW COMPLEX 30 MIN: CPT

## 2022-08-12 PROCEDURE — 80053 COMPREHEN METABOLIC PANEL: CPT | Performed by: HOSPITALIST

## 2022-08-12 PROCEDURE — 0JBR0ZZ EXCISION OF LEFT FOOT SUBCUTANEOUS TISSUE AND FASCIA, OPEN APPROACH: ICD-10-PCS | Performed by: STUDENT IN AN ORGANIZED HEALTH CARE EDUCATION/TRAINING PROGRAM

## 2022-08-12 PROCEDURE — 87070 CULTURE OTHR SPECIMN AEROBIC: CPT | Performed by: STUDENT IN AN ORGANIZED HEALTH CARE EDUCATION/TRAINING PROGRAM

## 2022-08-12 PROCEDURE — 85025 COMPLETE CBC W/AUTO DIFF WBC: CPT | Performed by: HOSPITALIST

## 2022-08-12 PROCEDURE — 99213 OFFICE O/P EST LOW 20 MIN: CPT

## 2022-08-12 PROCEDURE — 97116 GAIT TRAINING THERAPY: CPT

## 2022-08-12 PROCEDURE — 85652 RBC SED RATE AUTOMATED: CPT | Performed by: INTERNAL MEDICINE

## 2022-08-12 PROCEDURE — 87205 SMEAR GRAM STAIN: CPT | Performed by: STUDENT IN AN ORGANIZED HEALTH CARE EDUCATION/TRAINING PROGRAM

## 2022-08-12 PROCEDURE — 82962 GLUCOSE BLOOD TEST: CPT

## 2022-08-12 PROCEDURE — 93926 LOWER EXTREMITY STUDY: CPT | Performed by: HOSPITALIST

## 2022-08-12 PROCEDURE — 84132 ASSAY OF SERUM POTASSIUM: CPT | Performed by: HOSPITALIST

## 2022-08-12 RX ORDER — VANCOMYCIN HYDROCHLORIDE
25 ONCE
Status: COMPLETED | OUTPATIENT
Start: 2022-08-12 | End: 2022-08-12

## 2022-08-12 RX ORDER — FUROSEMIDE 10 MG/ML
20 INJECTION INTRAMUSCULAR; INTRAVENOUS ONCE
Status: COMPLETED | OUTPATIENT
Start: 2022-08-12 | End: 2022-08-12

## 2022-08-12 RX ORDER — FUROSEMIDE 40 MG/1
40 TABLET ORAL DAILY
Status: DISCONTINUED | OUTPATIENT
Start: 2022-08-13 | End: 2022-08-16

## 2022-08-12 RX ORDER — VANCOMYCIN HYDROCHLORIDE
15 EVERY 12 HOURS
Status: DISCONTINUED | OUTPATIENT
Start: 2022-08-12 | End: 2022-08-12 | Stop reason: SDUPTHER

## 2022-08-12 NOTE — PLAN OF CARE
Aox4, Frisian speaking, denying pain, dressing to left foot c/d/I, afebrile at this time, Lovenox and ASA, voiding, UA +, urine cxs pending, on Zosyn Q8, MRI of left foot completed results pending, IVF as ordered, blood cxs pending, wound cxs pending, up with assist, PT/OT to see, no further needs, will continue to monitor.

## 2022-08-12 NOTE — PLAN OF CARE
Endorsed to oncoming RN @307     Awaiting podiatry and ID consults    Echo at bedside   Problem: PAIN - ADULT  Goal: Verbalizes/displays adequate comfort level or patient's stated pain goal  Description: INTERVENTIONS:  - Encourage pt to monitor pain and request assistance  - Assess pain using appropriate pain scale  - Administer analgesics based on type and severity of pain and evaluate response  - Implement non-pharmacological measures as appropriate and evaluate response  - Consider cultural and social influences on pain and pain management  - Manage/alleviate anxiety  - Utilize distraction and/or relaxation techniques  - Monitor for opioid side effects  - Notify MD/LIP if interventions unsuccessful or patient reports new pain  - Anticipate increased pain with activity and pre-medicate as appropriate  Outcome: Progressing     Problem: RISK FOR INFECTION - ADULT  Goal: Absence of fever/infection during anticipated neutropenic period  Description: INTERVENTIONS  - Monitor WBC  - Administer growth factors as ordered  - Implement neutropenic guidelines  Outcome: Progressing     Problem: SAFETY ADULT - FALL  Goal: Free from fall injury  Description: INTERVENTIONS:  - Assess pt frequently for physical needs  - Identify cognitive and physical deficits and behaviors that affect risk of falls.   - Halma fall precautions as indicated by assessment.  - Educate pt/family on patient safety including physical limitations  - Instruct pt to call for assistance with activity based on assessment  - Modify environment to reduce risk of injury  - Provide assistive devices as appropriate  - Consider OT/PT consult to assist with strengthening/mobility  - Encourage toileting schedule  Outcome: Progressing     Problem: Altered Communication/Language Barrier  Goal: Patient/Family is able to understand and participate in their care  Description: Interventions:  - Assess communication ability and preferred communication style  - Implement communication aides and strategies  - Use visual cues when possible  - Listen attentively, be patient, do not interrupt  - Minimize distractions  - Allow time for understanding and response  - Establish method for patient to ask for assistance (call light)  - Provide an  as needed  - Communicate barriers and strategies to overcome with those who interact with patient  Outcome: Progressing

## 2022-08-12 NOTE — PROGRESS NOTES
Assumed patient care at 15:30, utilized  during all patient interactions. Reporting mild pain to left leg, PO pain medication, see MAR. IV ABX continued, tolerating diet, denies N/V, voiding. A&Ox4, VSS on room air. 2L O2 PRN. Plan of care reviewed with patient, all questions, verbalized understanding.

## 2022-08-13 LAB
ALBUMIN SERPL-MCNC: 2.6 G/DL (ref 3.4–5)
ANION GAP SERPL CALC-SCNC: 5 MMOL/L (ref 0–18)
ARTERIAL PATENCY WRIST A: POSITIVE
BASE EXCESS BLDA CALC-SCNC: 4.7 MMOL/L (ref ?–2)
BODY TEMPERATURE: 98.6 F
BUN BLD-MCNC: 16 MG/DL (ref 7–18)
CALCIUM BLD-MCNC: 8.4 MG/DL (ref 8.5–10.1)
CHLORIDE SERPL-SCNC: 105 MMOL/L (ref 98–112)
CO2 SERPL-SCNC: 24 MMOL/L (ref 21–32)
COHGB MFR BLD: 1.1 % SAT (ref 0–3)
CREAT BLD-MCNC: 1.12 MG/DL
CREAT BLD-MCNC: 1.12 MG/DL
DEPRECATED HBV CORE AB SER IA-ACNC: 206.1 NG/ML
ERYTHROCYTE [DISTWIDTH] IN BLOOD BY AUTOMATED COUNT: 13.1 %
GFR SERPLBLD BASED ON 1.73 SQ M-ARVRAT: 55 ML/MIN/1.73M2 (ref 60–?)
GFR SERPLBLD BASED ON 1.73 SQ M-ARVRAT: 55 ML/MIN/1.73M2 (ref 60–?)
GLUCOSE BLD-MCNC: 195 MG/DL (ref 70–99)
GLUCOSE BLD-MCNC: 198 MG/DL (ref 70–99)
GLUCOSE BLD-MCNC: 287 MG/DL (ref 70–99)
GLUCOSE BLD-MCNC: 294 MG/DL (ref 70–99)
GLUCOSE BLD-MCNC: 313 MG/DL (ref 70–99)
GLUCOSE BLD-MCNC: 344 MG/DL (ref 70–99)
HCO3 BLDA-SCNC: 27.8 MEQ/L (ref 21–27)
HCT VFR BLD AUTO: 37.6 %
HGB BLD-MCNC: 10.9 G/DL
HGB BLD-MCNC: 11.8 G/DL
IRON SATN MFR SERPL: 18 %
IRON SERPL-MCNC: 41 UG/DL
L/M: 1 L/MIN
MCH RBC QN AUTO: 27.4 PG (ref 26–34)
MCHC RBC AUTO-ENTMCNC: 31.4 G/DL (ref 31–37)
MCV RBC AUTO: 87.2 FL
METHGB MFR BLD: 0 % SAT (ref 0.4–1.5)
OSMOLALITY SERPL CALC.SUM OF ELEC: 285 MOSM/KG (ref 275–295)
OXYHGB MFR BLDA: 60.6 % (ref 92–100)
PCO2 BLDA: 42 MM HG (ref 35–45)
PH BLDA: 7.45 [PH] (ref 7.35–7.45)
PHOSPHATE SERPL-MCNC: 2.1 MG/DL (ref 2.5–4.9)
PLATELET # BLD AUTO: 227 10(3)UL (ref 150–450)
PO2 BLDA: 31 MM HG (ref 80–100)
POTASSIUM SERPL-SCNC: 3.7 MMOL/L (ref 3.5–5.1)
RBC # BLD AUTO: 4.31 X10(6)UL
SODIUM SERPL-SCNC: 134 MMOL/L (ref 136–145)
TIBC SERPL-MCNC: 228 UG/DL (ref 240–450)
TRANSFERRIN SERPL-MCNC: 153 MG/DL (ref 200–360)
WBC # BLD AUTO: 14.8 X10(3) UL (ref 4–11)

## 2022-08-13 PROCEDURE — 85018 HEMOGLOBIN: CPT | Performed by: HOSPITALIST

## 2022-08-13 PROCEDURE — 82565 ASSAY OF CREATININE: CPT | Performed by: INTERNAL MEDICINE

## 2022-08-13 PROCEDURE — 36600 WITHDRAWAL OF ARTERIAL BLOOD: CPT | Performed by: HOSPITALIST

## 2022-08-13 PROCEDURE — 80069 RENAL FUNCTION PANEL: CPT | Performed by: INTERNAL MEDICINE

## 2022-08-13 PROCEDURE — 82375 ASSAY CARBOXYHB QUANT: CPT | Performed by: HOSPITALIST

## 2022-08-13 PROCEDURE — 83540 ASSAY OF IRON: CPT | Performed by: HOSPITALIST

## 2022-08-13 PROCEDURE — 82803 BLOOD GASES ANY COMBINATION: CPT | Performed by: HOSPITALIST

## 2022-08-13 PROCEDURE — 83550 IRON BINDING TEST: CPT | Performed by: HOSPITALIST

## 2022-08-13 PROCEDURE — 85027 COMPLETE CBC AUTOMATED: CPT | Performed by: INTERNAL MEDICINE

## 2022-08-13 PROCEDURE — 82962 GLUCOSE BLOOD TEST: CPT

## 2022-08-13 PROCEDURE — 82728 ASSAY OF FERRITIN: CPT | Performed by: HOSPITALIST

## 2022-08-13 PROCEDURE — 94640 AIRWAY INHALATION TREATMENT: CPT

## 2022-08-13 PROCEDURE — 83050 HGB METHEMOGLOBIN QUAN: CPT | Performed by: HOSPITALIST

## 2022-08-13 RX ORDER — FUROSEMIDE 10 MG/ML
20 INJECTION INTRAMUSCULAR; INTRAVENOUS ONCE
Status: COMPLETED | OUTPATIENT
Start: 2022-08-13 | End: 2022-08-13

## 2022-08-13 RX ORDER — DOCUSATE SODIUM 100 MG/1
100 CAPSULE, LIQUID FILLED ORAL 2 TIMES DAILY
Status: DISCONTINUED | OUTPATIENT
Start: 2022-08-13 | End: 2022-08-16

## 2022-08-13 RX ORDER — POLYETHYLENE GLYCOL 3350 17 G/17G
17 POWDER, FOR SOLUTION ORAL DAILY
Status: DISCONTINUED | OUTPATIENT
Start: 2022-08-13 | End: 2022-08-16

## 2022-08-13 RX ORDER — FUROSEMIDE 10 MG/ML
40 INJECTION INTRAMUSCULAR; INTRAVENOUS ONCE
Status: DISCONTINUED | OUTPATIENT
Start: 2022-08-13 | End: 2022-08-13

## 2022-08-13 NOTE — PLAN OF CARE
Aox4, German speaking, daughter at bedside, interpretor used during patient care, dressing changed this evening to left foot, reporting minimal pain that is managed with ordered pain medication, on 2L PRN , scds, BLE edema, pedal pulse via doppler, refused insulin this evening, voiding, on Unasyn Q8, cxs pending, up min with post-op shoe minimal WB per podiatry, afebrile, no further needs, will continue to monitor.

## 2022-08-13 NOTE — PLAN OF CARE
Patient A&Ox4, utilizing  for all patient interactions. Patient on 1-2L O2, encouraging IS, deep breathing and coughing. Patient up in chair this evening, encouraging patient to sit up with pillow, patient continues to lay down. IV lasix x1 given. Dressing to L foot C/D/I, surgical shoe when up. IV ABX continued. Plan of care reviewed with patient and daughter at bedside, all questions answered, verbalized understanding. 1800: Patient with increased work of breathing, additional IV lasix dose given, ABGs ordered.

## 2022-08-14 LAB
ANION GAP SERPL CALC-SCNC: 4 MMOL/L (ref 0–18)
BASOPHILS # BLD AUTO: 0.02 X10(3) UL (ref 0–0.2)
BASOPHILS NFR BLD AUTO: 0.2 %
BUN BLD-MCNC: 13 MG/DL (ref 7–18)
CALCIUM BLD-MCNC: 8.4 MG/DL (ref 8.5–10.1)
CHLORIDE SERPL-SCNC: 105 MMOL/L (ref 98–112)
CO2 SERPL-SCNC: 28 MMOL/L (ref 21–32)
CREAT BLD-MCNC: 1 MG/DL
CREAT BLD-MCNC: 1 MG/DL
EOSINOPHIL # BLD AUTO: 0.04 X10(3) UL (ref 0–0.7)
EOSINOPHIL NFR BLD AUTO: 0.3 %
ERYTHROCYTE [DISTWIDTH] IN BLOOD BY AUTOMATED COUNT: 13.2 %
GFR SERPLBLD BASED ON 1.73 SQ M-ARVRAT: 63 ML/MIN/1.73M2 (ref 60–?)
GFR SERPLBLD BASED ON 1.73 SQ M-ARVRAT: 63 ML/MIN/1.73M2 (ref 60–?)
GLUCOSE BLD-MCNC: 140 MG/DL (ref 70–99)
GLUCOSE BLD-MCNC: 168 MG/DL (ref 70–99)
GLUCOSE BLD-MCNC: 170 MG/DL (ref 70–99)
GLUCOSE BLD-MCNC: 190 MG/DL (ref 70–99)
HCT VFR BLD AUTO: 33.9 %
HGB BLD-MCNC: 10.7 G/DL
IMM GRANULOCYTES # BLD AUTO: 0.06 X10(3) UL (ref 0–1)
IMM GRANULOCYTES NFR BLD: 0.5 %
LYMPHOCYTES # BLD AUTO: 1.58 X10(3) UL (ref 1–4)
LYMPHOCYTES NFR BLD AUTO: 13 %
MCH RBC QN AUTO: 27 PG (ref 26–34)
MCHC RBC AUTO-ENTMCNC: 31.6 G/DL (ref 31–37)
MCV RBC AUTO: 85.4 FL
MONOCYTES # BLD AUTO: 1.1 X10(3) UL (ref 0.1–1)
MONOCYTES NFR BLD AUTO: 9.1 %
NEUTROPHILS # BLD AUTO: 9.31 X10 (3) UL (ref 1.5–7.7)
NEUTROPHILS # BLD AUTO: 9.31 X10(3) UL (ref 1.5–7.7)
NEUTROPHILS NFR BLD AUTO: 76.9 %
NT-PROBNP SERPL-MCNC: 4514 PG/ML (ref ?–125)
OSMOLALITY SERPL CALC.SUM OF ELEC: 288 MOSM/KG (ref 275–295)
PHOSPHATE SERPL-MCNC: 2 MG/DL (ref 2.5–4.9)
PLATELET # BLD AUTO: 229 10(3)UL (ref 150–450)
POTASSIUM SERPL-SCNC: 3.5 MMOL/L (ref 3.5–5.1)
POTASSIUM SERPL-SCNC: 3.5 MMOL/L (ref 3.5–5.1)
RBC # BLD AUTO: 3.97 X10(6)UL
SODIUM SERPL-SCNC: 137 MMOL/L (ref 136–145)
WBC # BLD AUTO: 12.1 X10(3) UL (ref 4–11)

## 2022-08-14 PROCEDURE — 84100 ASSAY OF PHOSPHORUS: CPT | Performed by: HOSPITALIST

## 2022-08-14 PROCEDURE — 80048 BASIC METABOLIC PNL TOTAL CA: CPT | Performed by: HOSPITALIST

## 2022-08-14 PROCEDURE — 82565 ASSAY OF CREATININE: CPT | Performed by: INTERNAL MEDICINE

## 2022-08-14 PROCEDURE — 85025 COMPLETE CBC W/AUTO DIFF WBC: CPT | Performed by: PHYSICIAN ASSISTANT

## 2022-08-14 PROCEDURE — 83880 ASSAY OF NATRIURETIC PEPTIDE: CPT | Performed by: HOSPITALIST

## 2022-08-14 PROCEDURE — 84132 ASSAY OF SERUM POTASSIUM: CPT | Performed by: HOSPITALIST

## 2022-08-14 PROCEDURE — 82962 GLUCOSE BLOOD TEST: CPT

## 2022-08-14 RX ORDER — POTASSIUM CHLORIDE 20 MEQ/1
20 TABLET, EXTENDED RELEASE ORAL ONCE
Status: COMPLETED | OUTPATIENT
Start: 2022-08-14 | End: 2022-08-14

## 2022-08-14 RX ORDER — FUROSEMIDE 10 MG/ML
40 INJECTION INTRAMUSCULAR; INTRAVENOUS ONCE
Status: COMPLETED | OUTPATIENT
Start: 2022-08-14 | End: 2022-08-14

## 2022-08-14 RX ORDER — POTASSIUM CHLORIDE 14.9 MG/ML
20 INJECTION INTRAVENOUS ONCE
Status: DISCONTINUED | OUTPATIENT
Start: 2022-08-14 | End: 2022-08-14

## 2022-08-14 RX ORDER — ECHINACEA PURPUREA EXTRACT 125 MG
1 TABLET ORAL
Status: DISCONTINUED | OUTPATIENT
Start: 2022-08-14 | End: 2022-08-16

## 2022-08-14 NOTE — PLAN OF CARE
Aox4, reporting pain to left foot and hip, pain medication administered as needed, afebrile, dressing to left foot changed this evening, on 2L O2, wheezes Albuterol given as needed, on Lovenox + ASA, on tele nsr, Unasyn Q12, cxs pending, up min with walker and post op shoe, daughter at bedside, will continue to monitor.

## 2022-08-14 NOTE — PLAN OF CARE
Patient A&Ox4, VSS on 1 L O2. Utilizing  for all patient interactions. Dressing to L foot reinforced with kerlix, C/D/I. Patient reports mild pain to the L leg. Encouraging IS, patient tolerating well. Up in chair. MD aware of loss of IV access. Anesthesia paged to place PIV. Voiding freely, LBM 8/10, stool softener/laxative given, encouraged increase in fluids and high fiber foods. Plan of care reviewed with patient and daughter at bedside, all questions answered, verbalized understanding.

## 2022-08-15 ENCOUNTER — APPOINTMENT (OUTPATIENT)
Dept: GENERAL RADIOLOGY | Facility: HOSPITAL | Age: 62
End: 2022-08-15
Attending: HOSPITALIST
Payer: MEDICAID

## 2022-08-15 LAB
ANION GAP SERPL CALC-SCNC: <0 MMOL/L (ref 0–18)
BUN BLD-MCNC: 10 MG/DL (ref 7–18)
CALCIUM BLD-MCNC: 8.8 MG/DL (ref 8.5–10.1)
CHLORIDE SERPL-SCNC: 103 MMOL/L (ref 98–112)
CO2 SERPL-SCNC: 30 MMOL/L (ref 21–32)
CREAT BLD-MCNC: 0.9 MG/DL
CREAT BLD-MCNC: 0.9 MG/DL
GFR SERPLBLD BASED ON 1.73 SQ M-ARVRAT: 71 ML/MIN/1.73M2 (ref 60–?)
GFR SERPLBLD BASED ON 1.73 SQ M-ARVRAT: 71 ML/MIN/1.73M2 (ref 60–?)
GLUCOSE BLD-MCNC: 131 MG/DL (ref 70–99)
GLUCOSE BLD-MCNC: 158 MG/DL (ref 70–99)
GLUCOSE BLD-MCNC: 225 MG/DL (ref 70–99)
GLUCOSE BLD-MCNC: 99 MG/DL (ref 70–99)
OSMOLALITY SERPL CALC.SUM OF ELEC: 276 MOSM/KG (ref 275–295)
PHOSPHATE SERPL-MCNC: 3.1 MG/DL (ref 2.5–4.9)
POTASSIUM SERPL-SCNC: 3.5 MMOL/L (ref 3.5–5.1)
SODIUM SERPL-SCNC: 132 MMOL/L (ref 136–145)

## 2022-08-15 PROCEDURE — 71046 X-RAY EXAM CHEST 2 VIEWS: CPT | Performed by: HOSPITALIST

## 2022-08-15 PROCEDURE — B548ZZA ULTRASONOGRAPHY OF SUPERIOR VENA CAVA, GUIDANCE: ICD-10-PCS | Performed by: HOSPITALIST

## 2022-08-15 PROCEDURE — 02HV33Z INSERTION OF INFUSION DEVICE INTO SUPERIOR VENA CAVA, PERCUTANEOUS APPROACH: ICD-10-PCS | Performed by: HOSPITALIST

## 2022-08-15 PROCEDURE — 36569 INSJ PICC 5 YR+ W/O IMAGING: CPT

## 2022-08-15 PROCEDURE — 82962 GLUCOSE BLOOD TEST: CPT

## 2022-08-15 PROCEDURE — 80048 BASIC METABOLIC PNL TOTAL CA: CPT | Performed by: HOSPITALIST

## 2022-08-15 PROCEDURE — 84100 ASSAY OF PHOSPHORUS: CPT | Performed by: HOSPITALIST

## 2022-08-15 PROCEDURE — 84132 ASSAY OF SERUM POTASSIUM: CPT | Performed by: HOSPITALIST

## 2022-08-15 PROCEDURE — 82565 ASSAY OF CREATININE: CPT | Performed by: INTERNAL MEDICINE

## 2022-08-15 RX ORDER — POTASSIUM CHLORIDE 20 MEQ/1
40 TABLET, EXTENDED RELEASE ORAL EVERY 4 HOURS
Status: ACTIVE | OUTPATIENT
Start: 2022-08-15 | End: 2022-08-16

## 2022-08-15 RX ORDER — LIDOCAINE HYDROCHLORIDE 10 MG/ML
5 INJECTION, SOLUTION EPIDURAL; INFILTRATION; INTRACAUDAL; PERINEURAL
Status: COMPLETED | OUTPATIENT
Start: 2022-08-15 | End: 2022-08-15

## 2022-08-15 RX ORDER — CEFTRIAXONE SODIUM 2 G/50ML
2 INJECTION, SOLUTION INTRAVENOUS EVERY 24 HOURS
Qty: 2100 ML | Refills: 0 | Status: SHIPPED | OUTPATIENT
Start: 2022-08-15 | End: 2022-09-26

## 2022-08-15 RX ORDER — CEFAZOLIN SODIUM/WATER 2 G/20 ML
2 SYRINGE (ML) INTRAVENOUS EVERY 8 HOURS
Qty: 2520 ML | Refills: 0 | Status: SHIPPED | OUTPATIENT
Start: 2022-08-15 | End: 2022-08-15

## 2022-08-15 RX ORDER — FUROSEMIDE 10 MG/ML
60 INJECTION INTRAMUSCULAR; INTRAVENOUS ONCE
Status: COMPLETED | OUTPATIENT
Start: 2022-08-15 | End: 2022-08-15

## 2022-08-15 NOTE — PLAN OF CARE
Pt alert and oriented x4. Speaks Georgian, utilized iPad  services. VS stable on 2L O2 via NC. Chest XR complete, IV lasix given per order. IS use encouraged. Tele NSR. Lovenox subcutaneous. Tolerating diet, denies N/V. Accucheck. Pitting edema to BLE, LLE > RLE. Podiatry MD changed dressing this evening, dressing to left foot currently CDI. Right PICC in place, tolerating IV abx. Plan for home with [place] [id number] and IV abx when medically cleared. Pt and son updated to plan of care, all questions answered. Instructed to call for assistance, call light within reach. Fall precautions in place.

## 2022-08-15 NOTE — PHYSICAL THERAPY NOTE
Started working with pt for PT treatment session. However, a doctor arrived during sesison to talk with pt and then transport arrived to take pt for PICC line placement. Will follow and re-attempt as able and appropriate. RN aware.

## 2022-08-15 NOTE — CM/SW NOTE
CM spoke to pt's son, Stacy Hankins, regarding discharge planning and IV antibiotics. Stacy Hankins stated that it is just his mother and him residing in the home and he works full time. Therefore, there will not be anyone available to administer the IV antibiotics every 8 hours. Stacy Hankins stated that he is willing and able to learn and administer the IV antibiotics daily, if possible. CM contacted Dr. Shane Walls and [facility]ARDEN[facility] THAYER regarding script for daily IV Rocephin. Bernadine Murdock [phone] at [facility] contacted to inquire about prior auth for IV Rocephin. CM explained to Stacy Hankins that [facility] would supply the medication/supplies needed to administer the medication. Discussed need for weekly line care/labs via home health (pending acceptance) or weekly visits to the [facility] infusion clinic. Stacy Hankins verbalized that he would prefer line care/labs through home health, but understands this is not guaranteed through insurance. Referrals were previously sent in [address] [place] [address] by [facility]. SW/CM to remain available for support and/or discharge planning.     Dianna Powell, WYATTN, RN-BC    [id number]

## 2022-08-15 NOTE — PLAN OF CARE
Patient is alert and oriented x4. Pt wore 2L NC at bedtime. Patient lower extremities are swollen. Lungs are diminished. On cardiac telemetry. 1 assist to commode. Patient receiving daily lasix. Determinism IV abx verus surgery for foot wound,.

## 2022-08-15 NOTE — CM/SW NOTE
Department  notified of request for michelle TURCIOS referrals started. Assigned CM/SW to follow up with pt/family on further discharge planning.      Amish Britt  [facility]

## 2022-08-16 VITALS
RESPIRATION RATE: 18 BRPM | HEART RATE: 75 BPM | BODY MASS INDEX: 35.08 KG/M2 | WEIGHT: 174 LBS | DIASTOLIC BLOOD PRESSURE: 66 MMHG | TEMPERATURE: 98 F | HEIGHT: 59 IN | OXYGEN SATURATION: 100 % | SYSTOLIC BLOOD PRESSURE: 160 MMHG

## 2022-08-16 LAB
CREAT BLD-MCNC: 0.91 MG/DL
GFR SERPLBLD BASED ON 1.73 SQ M-ARVRAT: 70 ML/MIN/1.73M2 (ref 60–?)
GLUCOSE BLD-MCNC: 200 MG/DL (ref 70–99)
GLUCOSE BLD-MCNC: 236 MG/DL (ref 70–99)
GLUCOSE BLD-MCNC: 288 MG/DL (ref 70–99)
POTASSIUM SERPL-SCNC: 3.6 MMOL/L (ref 3.5–5.1)

## 2022-08-16 PROCEDURE — 82962 GLUCOSE BLOOD TEST: CPT

## 2022-08-16 PROCEDURE — 97535 SELF CARE MNGMENT TRAINING: CPT

## 2022-08-16 PROCEDURE — 97530 THERAPEUTIC ACTIVITIES: CPT

## 2022-08-16 PROCEDURE — 84132 ASSAY OF SERUM POTASSIUM: CPT | Performed by: HOSPITALIST

## 2022-08-16 PROCEDURE — 82565 ASSAY OF CREATININE: CPT | Performed by: INTERNAL MEDICINE

## 2022-08-16 PROCEDURE — 97110 THERAPEUTIC EXERCISES: CPT

## 2022-08-16 PROCEDURE — 97116 GAIT TRAINING THERAPY: CPT

## 2022-08-16 RX ORDER — POTASSIUM CHLORIDE 1.5 G/1.77G
40 POWDER, FOR SOLUTION ORAL EVERY 4 HOURS
Status: DISPENSED | OUTPATIENT
Start: 2022-08-16 | End: 2022-08-16

## 2022-08-16 RX ORDER — FUROSEMIDE 40 MG/1
TABLET ORAL
Qty: 60 TABLET | Refills: 0 | Status: SHIPPED | OUTPATIENT
Start: 2022-08-16

## 2022-08-16 RX ORDER — ISOSORBIDE MONONITRATE 60 MG/1
60 TABLET, EXTENDED RELEASE ORAL DAILY
Status: SHIPPED | COMMUNITY
Start: 2022-08-16

## 2022-08-16 RX ORDER — INSULIN GLARGINE 100 [IU]/ML
20 INJECTION, SOLUTION SUBCUTANEOUS 2 TIMES DAILY
Refills: 0 | Status: SHIPPED | COMMUNITY
Start: 2022-08-16

## 2022-08-16 NOTE — PROGRESS NOTES
Pt cleared by all MD's for discharge. Discharge instructions reviewed with pt and son. All questions answered. Pt discharging to home with Landry Pradhan via wheelchair.

## 2022-08-16 NOTE — PLAN OF CARE
Aox4, Setswana speaking using iPad  during [address], daughter at bedside, denying pain at this time, dressing by podiatry c/d/I with small amount of old drainage, on 2L , IS encouraged, on Lovenox, scds refused, voiding, up min weight bearing to left foot with post-op shoe, strict I&O, daily weight, on Unasyn, PICC to RUE, no further needs, will continue to monitor.

## 2022-08-16 NOTE — PLAN OF CARE
Pt alert and oriented x4. Speaks Mohawk. VS stable on room air. IS use encouraged. Tele NSR. Lovenox subcutaneous. Tolerating diet, denies N/V. Accucheck. Pitting edema to BLE, LLE > RLE. Dressing to left foot, DI scant old drainage noted. Right PICC in place, tolerating IV abx. Plan for home with [place] [id number] and IV abx today. Pt and daughter updated to plan of care, all questions answered. Instructed to call for assistance, call light within reach. Fall precautions in place.

## 2022-08-16 NOTE — PHYSICAL THERAPY NOTE
PHYSICAL THERAPY TREATMENT NOTE - INPATIENT    Room Number: 361/361-A     Session: 1     Number of Visits to Meet Established Goals: 2    History related to current admission: Patient is a 57year old female admitted on 2022 from home for fever/vomitting, increased redness and swelling to L leg. Pt diagnosed with diabetic L foot ulcer. Pt with history of [address] [facility] [address] admission 1 year ago with similar diagnosis with return home with family support. Presenting Problem: DIabetic foot infection  Co-Morbidities : CAD, CABG, DM2, HTN, chronic foot wounds  ASSESSMENT     Pt continues to make progress toward functional goals and demonstrates good understanding of WB limitations with education provided to pt and daughter present. Pt provided with seated HEP and reviewed with good return demo. DISCHARGE RECOMMENDATIONS  PT Discharge Recommendations: Home; Intermittent Supervision     PLAN  PT Treatment Plan: Endurance; Energy conservation;Patient education;Gait training;Strengthening;Transfer training;Balance training;Stoop training  Rehab Potential : Good  Frequency (Obs): 3-5x/week    CURRENT GOALS        Goal #1 Pt will negotiate one step stoop with r/w and cga. Goal #2 Patient is able to demonstrate transfers EOB to/from Chair/Wheelchair at assistance level: modified independent      Goal #3 Patient is able to ambulate 50 feet with assist device: walker - rolling at assistance level: supervision while maintaining WB status      Goal #4     Goal #5     Goal #6     Goal Comments: Goals established on 2022 all goals ongoing      SUBJECTIVE  Pt denies pain     OBJECTIVE  Precautions: Bed/chair alarm;  needed (Georgian )    WEIGHT BEARING RESTRICTION  Weight Bearing Restriction:  (Currently keeping heel wb L LE, awaiting podiatry rec)                PAIN ASSESSMENT   Ratin          BALANCE Static Sitting: Good  Dynamic Sitting: Good           Static Standing: Fair -  Dynamic Standing: Fair -    ACTIVITY TOLERANCE                         O2 WALK         AM-PAC '6-Clicks' INPATIENT SHORT FORM - BASIC MOBILITY  How much difficulty does the patient currently have. .. Patient Difficulty: Turning over in bed (including adjusting bedclothes, sheets and blankets)?: None   Patient Difficulty: Sitting down on and standing up from a chair with arms (e.g., wheelchair, bedside commode, etc.): None   Patient Difficulty: Moving from lying on back to sitting on the side of the bed?: None   How much help from another person does the patient currently need. .. Help from Another: Moving to and from a bed to a chair (including a wheelchair)?: A Little   Help from Another: Need to walk in hospital room?: A Little   Help from Another: Climbing 3-5 steps with a railing?: A Little       AM-PAC Score:  Raw Score: 21   Approx Degree of Impairment: 28.97%   Standardized Score (AM-PAC Scale): 50.25   CMS Modifier (G-Code): NIRANJAN    FUNCTIONAL ABILITY STATUS  Gait Assessment   Functional Mobility/Gait Assessment  Gait Assistance: Contact guard assist  Distance (ft): 20  Assistive Device: Rolling walker  Pattern: L Decreased stance time (cues for partial WB LLE )    Skilled Therapy Provided  Per podiatry MD note: -Patient can be minimally WB to left foot with post op shoe. Long term she will likely need CROW walker or custom shoe to keep site healed with this option if healing is achieved. Consulted c RN prior to session. Pt presents seated in BS chair c daughter present. Educated both on role of PT and goals for session with emphasis on minimal WB to L foot, minimizing ambulation and increased support at home for pt to facilitate such.  Daughter in agreement and translating per family request.   Pt performed seated therex for BLE BUE to promote strength with cues for body mechanics and minimal WB L foot. Pt gait trained in room to /from BR c RW with pt maintaining WB precautions. Toilet t/f supervision to mod I, pt ind c aspen care. Pt returned to [facility] chair supervision. GB and seated HEP provided to pt. Pt states she has RW for home use. All questions and concerns addressed. Transfer Mobility:  Sit<>Stand: CGA   Stand<>Sit: supervision    Gait: CGA , for turns , pt maintaining NWB with turn mild LOB posteriorly requiring CGA , educated daughter on GB use     Therapist's Comments: GB issued       THERAPEUTIC EXERCISES  Lower Extremity Alternating marching  Hip adduction squeezes  Knee extension  LAQ     Upper Extremity chair pushups      Position Sitting     Repetitions   10-15   Sets   1     Patient End of Session: Up in chair;Needs met;Call light within reach;RN aware of session/findings; All patient questions and concerns addressed; Family present

## 2022-08-16 NOTE — CM/SW NOTE
CM notified of patient likely being ready for discharge today. Family's preference is to discharge with [facility] and [address] [facility] for line care/labs. Awaiting HH referral responses in [facility]. [address] [place] [address] Road. HH able to accept patient, but unable to start care until Thursday 8/18. CM spoke with Sabina Calvert [phone] at [place] [address] regarding discharge plan for patient. Order for antibiotic, flush, and labs added to [facility] referral. Sabina Cancer notified of pt's [facility] agency not being able to start care until Thursday. Sabina Cancer stated that [facility] will send a RN to the hospital to teach patient's son, Avelino Singletary, how to administer IV antibiotics. CM provided [employer] contact information to coordinate time for Irfan to be present for teaching. CM updated Irfan on discharge plan and coordination of [facility]. Avelino Singletary verbalized that he is willing and able to come to the hospital for teaching to begin daily IV antibiotic administrations tomorrow 8/17. Avelino Singletary agreed to receive call from [facility] to coordinate time. Avelino Singletary made aware of [address] agency to begin care starting on 8/18. Irfan agreeable to patient's discharge plan. 1400: IRISH spoke to Daniel Pack at [address]. [facility] ([phone] to verbally verify acceptance. [facility] confirmed acceptance and stated care can now be started tomorrow. Laura at [place] [address] updated. Per Sabina Cancer, pt's son will arrive at hospital this afternoon at 1500 to undergo teaching by RN from [facility]. Supplies/medication to be delivered to pt's home tomorrow via [facility]. [facility] RN to visit pt's home tomorrow to assist with first antibiotic administration. Information for both agencies entered in AVS. RN and pt's son updated on plan.       WYATT AllenN, RN-BC    [id number]

## 2022-08-22 ENCOUNTER — OFFICE VISIT (OUTPATIENT)
Dept: WOUND CARE | Facility: HOSPITAL | Age: 62
End: 2022-08-22
Attending: STUDENT IN AN ORGANIZED HEALTH CARE EDUCATION/TRAINING PROGRAM
Payer: MEDICAID

## 2022-08-22 VITALS
WEIGHT: 174 LBS | DIASTOLIC BLOOD PRESSURE: 65 MMHG | SYSTOLIC BLOOD PRESSURE: 105 MMHG | TEMPERATURE: 98 F | HEIGHT: 59 IN | BODY MASS INDEX: 35.08 KG/M2 | HEART RATE: 73 BPM | RESPIRATION RATE: 16 BRPM

## 2022-08-22 DIAGNOSIS — M14.672 CHARCOT'S JOINT OF LEFT FOOT: ICD-10-CM

## 2022-08-22 DIAGNOSIS — L97.524 FOOT ULCER, LEFT, WITH NECROSIS OF BONE (HCC): Primary | ICD-10-CM

## 2022-08-22 DIAGNOSIS — E11.42 DIABETIC POLYNEUROPATHY ASSOCIATED WITH TYPE 2 DIABETES MELLITUS (HCC): ICD-10-CM

## 2022-08-22 LAB — GLUCOSE BLD-MCNC: 263 MG/DL (ref 70–99)

## 2022-08-22 PROCEDURE — 11042 DBRDMT SUBQ TIS 1ST 20SQCM/<: CPT | Performed by: STUDENT IN AN ORGANIZED HEALTH CARE EDUCATION/TRAINING PROGRAM

## 2022-08-22 NOTE — PROGRESS NOTES
Weekly Wound Education Note    Teaching Provided To: Patient; Family  Training Topics: Dressing;Cleasing and general instructions; Discharge instructions  Training Method: Demonstration;Explain/Verbal;Written  Training Response: Patient responds and understands        Notes: Cleanse left plantar foot with saline or wound cleanser, apply strip of silver alginate leaving tail sticking out of wound, cover with gauze and tape.  Felted offloading foam to periwound may leave in place and change as needed (extra provided) Ambulate minimally with Darco offloading shoe in place

## 2022-08-22 NOTE — PATIENT INSTRUCTIONS
Wound Care Patient Instructions/Details      Wound number: All wounds  Wound description: Left plantar foot    Wound Cleaning and Dressings:  May shower with protection. Protect wound and dressing. 1. Wash your hands with soap and water. Remove old dressing, discard and place into trash. Do not use tissues or cotton balls. There are no questions and answers to display. Compression Therapy ordered: No    Off-Loading: There are no questions and answers to display. Additional wounds: No    Miscellaneous Instructions:  1. Supplement with a daily multivitamin or other supplement as directed by your provider    2. Increase activity as tolerated or as directed by your provider    3. As directed by your providers remain minimally weightbearing to left foot with Darco offloading shoe and felt offloading pad. Perform dressing changes every 1 to 2 days with silver packing. 4.   Please call the clinic if you notice increased redness, warmth or pus like drainage or         start running a fever greater than 100.4. If it is after hours please call your primary        physician or go to the nearest emergency room. 5.  The treatment plan has been discussed at length with you and your provider. Follow all       instructions carefully, it is very important. If you do not follow all instructions, you are at        risk of your wound not healing, infection, possible loss of limb and even end of life.

## 2022-08-22 NOTE — PROGRESS NOTES
Patient ID: Bob Jay is a 57year old female. Debridement   Wound 08/22/22 #1 Diabetic Ulcer Foot Left;Plantar    Consent obtained? verbal  Consent given by: patient  Risks discussed?  procedural risks not discussed  Time out called at 8/22/2022 11:18 AM  Immediately prior to the procedure a time out was called  Performed by: provider  Debridement type: surgical  Level of debridement: subcutaneous tissue  Pain control: none  Pre-debridement measurements  Length (cm): 1.1  Width (cm): 0.9  Depth (cm): 1.4  Surface Area (cm^2): 0.99  Volume (cm^3): 1.39    Post-debridement measurements  Length (cm): 1.2  Width (cm): 1  Depth (cm): 1.9  Percent debrided: 100%  Surface Area (cm^2): 1.2  Area debrided (cm^2): 1.2  Volume (cm^3): 2.28  Tissue and other material debrided: subcutaneous tissue  Devitalized tissue debrided: biofilm, callus, exudate, fibrin and necrotic debris  Instrument(s) utilized: blade  Bleeding: small  Hemostasis obtained with: not applicable  Procedural pain (0-10): 0  Post-procedural pain: 0   Response to treatment: procedure was tolerated well

## 2022-08-29 ENCOUNTER — OFFICE VISIT (OUTPATIENT)
Dept: WOUND CARE | Facility: HOSPITAL | Age: 62
End: 2022-08-29
Attending: STUDENT IN AN ORGANIZED HEALTH CARE EDUCATION/TRAINING PROGRAM
Payer: MEDICAID

## 2022-08-29 VITALS
HEART RATE: 76 BPM | RESPIRATION RATE: 16 BRPM | TEMPERATURE: 98 F | SYSTOLIC BLOOD PRESSURE: 102 MMHG | DIASTOLIC BLOOD PRESSURE: 62 MMHG

## 2022-08-29 DIAGNOSIS — L97.524 FOOT ULCER, LEFT, WITH NECROSIS OF BONE (HCC): Primary | ICD-10-CM

## 2022-08-29 DIAGNOSIS — M14.672 CHARCOT'S JOINT OF LEFT FOOT: ICD-10-CM

## 2022-08-29 DIAGNOSIS — E11.42 DIABETIC POLYNEUROPATHY ASSOCIATED WITH TYPE 2 DIABETES MELLITUS (HCC): ICD-10-CM

## 2022-08-29 LAB — GLUCOSE BLD-MCNC: 285 MG/DL (ref 70–99)

## 2022-08-29 PROCEDURE — 11042 DBRDMT SUBQ TIS 1ST 20SQCM/<: CPT | Performed by: STUDENT IN AN ORGANIZED HEALTH CARE EDUCATION/TRAINING PROGRAM

## 2022-08-29 NOTE — PROGRESS NOTES
Weekly Wound Education Note    Teaching Provided To: Patient  Training Topics: Discharge instructions;Dressing  Training Method: Demonstration;Explain/Verbal;Written  Training Response: Patient responds and understands        Notes: Cleanse left foot wound with saline or wound cleanser, apply thin layer of Triad to periwound, apply  liz to wound bed, moisten with saline if there is scant wound drainage, cover with hydrofera transfer, cover with abd pad secured with kerlix.  Change every other day and as needed if increased drainage, Continue to wear Darco offloading shoe when walking

## 2022-08-29 NOTE — PATIENT INSTRUCTIONS
Wound Care Patient Instructions/Details      Wound number: All wounds  Wound description: Left plantar foot    Wound Cleaning and Dressings:  May shower with protection. Protect wound and dressing. 1. Wash your hands with soap and water. Remove old dressing, discard and place into trash. Do not use tissues or cotton balls. There are no questions and answers to display. Compression Therapy ordered: No    Off-Loading: There are no questions and answers to display. Additional wounds: No    Miscellaneous Instructions:  1. Supplement with a daily multivitamin or other supplement as directed by your provider    2. Increase activity as tolerated or as directed by your provider    3. As directed by your providers perform dressing changes with Winnie and Hydrofera Blue every other day. Remain minimally weightbearing to left foot with Darco offloading shoe. 4.   Please call the clinic if you notice increased redness, warmth or pus like drainage or         start running a fever greater than 100.4. If it is after hours please call your primary        physician or go to the nearest emergency room. 5.  The treatment plan has been discussed at length with you and your provider. Follow all       instructions carefully, it is very important. If you do not follow all instructions, you are at        risk of your wound not healing, infection, possible loss of limb and even end of life.

## 2022-08-29 NOTE — PROGRESS NOTES
Patient ID: Aakash Loja is a 57year old female. Debridement   Wound 08/22/22 #1 Diabetic Ulcer Foot Left;Plantar    Consent obtained? verbal  Consent given by: patient  Risks discussed? procedural risks discussed  Time out called at 8/29/2022 11:40 AM  Immediately prior to the procedure a time out was called  Performed by: provider  Debridement type: surgical  Level of debridement: subcutaneous tissue  Pain control: lidocaine 4%  Pre-debridement measurements  Length (cm): 1  Width (cm): 0.9  Depth (cm): 1.5  Surface Area (cm^2): 0.9  Volume (cm^3): 1.35    Post-debridement measurements  Length (cm): 1.1  Width (cm): 1  Depth (cm): 1.5  Percent debrided: 100%  Surface Area (cm^2): 1.1  Area debrided (cm^2): 1.1  Volume (cm^3):  1.65  Tissue and other material debrided: subcutaneous tissue  Devitalized tissue debrided: biofilm, callus, exudate and fibrin  Instrument(s) utilized: blade  Bleeding: small  Hemostasis obtained with: pressure  Procedural pain (0-10): 0  Post-procedural pain: 0   Response to treatment: procedure was tolerated well

## 2022-09-12 ENCOUNTER — OFFICE VISIT (OUTPATIENT)
Dept: WOUND CARE | Facility: HOSPITAL | Age: 62
End: 2022-09-12
Attending: STUDENT IN AN ORGANIZED HEALTH CARE EDUCATION/TRAINING PROGRAM
Payer: MEDICAID

## 2022-09-12 VITALS
HEART RATE: 67 BPM | DIASTOLIC BLOOD PRESSURE: 82 MMHG | SYSTOLIC BLOOD PRESSURE: 159 MMHG | TEMPERATURE: 98 F | RESPIRATION RATE: 18 BRPM

## 2022-09-12 DIAGNOSIS — M14.672 CHARCOT'S JOINT OF LEFT FOOT: ICD-10-CM

## 2022-09-12 DIAGNOSIS — L97.524 FOOT ULCER, LEFT, WITH NECROSIS OF BONE (HCC): Primary | ICD-10-CM

## 2022-09-12 DIAGNOSIS — E11.42 DIABETIC POLYNEUROPATHY ASSOCIATED WITH TYPE 2 DIABETES MELLITUS (HCC): ICD-10-CM

## 2022-09-12 LAB — GLUCOSE BLD-MCNC: 393 MG/DL (ref 70–99)

## 2022-09-12 PROCEDURE — 11042 DBRDMT SUBQ TIS 1ST 20SQCM/<: CPT | Performed by: STUDENT IN AN ORGANIZED HEALTH CARE EDUCATION/TRAINING PROGRAM

## 2022-09-12 NOTE — PATIENT INSTRUCTIONS
Wound Care Patient Instructions/Details      Wound number: All wounds  Wound description: Left plantar foot  Wound Cleaning and Dressings:  May shower with protection. Protect wound and dressing. 1. Wash your hands with soap and water. Remove old dressing, discard and place into trash. Do not use tissues or cotton balls. There are no questions and answers to display. Compression Therapy ordered: No    Off-Loading: There are no questions and answers to display. Additional wounds: No    Miscellaneous Instructions:  1. Supplement with a daily multivitamin or other supplement as directed by your provider    2. Increase activity as tolerated or as directed by your provider    3. As directed by your providers continue dressing changes with Winnie and Hydrofera Blue every other day. Continue IV antibiotics per infectious disease. Ambulate with Darco offloading shoe. If site is looking okay we will plan to apply total contact cast next Monday. 4.   Please call the clinic if you notice increased redness, warmth or pus like drainage or         start running a fever greater than 100.4. If it is after hours please call your primary        physician or go to the nearest emergency room. 5.  The treatment plan has been discussed at length with you and your provider. Follow all       instructions carefully, it is very important. If you do not follow all instructions, you are at        risk of your wound not healing, infection, possible loss of limb and even end of life.

## 2022-09-12 NOTE — PROGRESS NOTES
Patient ID: Renée Gonzalez is a 57year old female. Debridement   Wound 08/22/22 #1 Diabetic Ulcer Foot Left;Plantar    Consent obtained? verbal  Consent given by: patient  Risks discussed?  procedural risks discussed  Time out called at 9/12/2022 11:22 AM  Immediately prior to the procedure a time out was called  Performed by: provider  Debridement type: surgical  Level of debridement: subcutaneous tissue    Pre-debridement measurements  Length (cm): 0.9  Width (cm): 0.6  Depth (cm): 1.2  Surface Area (cm^2): 0.54  Volume (cm^3): 0.65    Post-debridement measurements  Length (cm): 1  Width (cm): 0.7  Depth (cm): 1.2  Percent debrided: 100%  Surface Area (cm^2): 0.7  Area debrided (cm^2): 0.7  Volume (cm^3): 0.84  Tissue and other material debrided: subcutaneous tissue  Devitalized tissue debrided: biofilm, callus, exudate, fibrin and necrotic debris  Instrument(s) utilized: blade  Bleeding: small  Hemostasis obtained with: pressure  Procedural pain (0-10): 0  Post-procedural pain: 0   Response to treatment: procedure was tolerated well

## 2022-09-12 NOTE — PROGRESS NOTES
Weekly Wound Education Note    Teaching Provided To: Patient; Family  Training Topics: Off-loading;Dressing; Discharge instructions  Training Method: Demonstration;Explain/Verbal;Written  Training Response: Patient responds and understands        Notes: cleanse  left foot wound with saline or wound cleanser, applyy liz and hydrofera ready, change every other day, offloading felted foam to planter foot offloading wound may be left in place if intact and only dressing changed. Wear only Darco offloading (black) shoe that was provided when walking. Do not wear surgical shoe.  Intensive attention to blood glucose control

## 2022-09-13 ENCOUNTER — TELEPHONE (OUTPATIENT)
Dept: PODIATRY CLINIC | Facility: CLINIC | Age: 62
End: 2022-09-13

## 2022-09-13 NOTE — TELEPHONE ENCOUNTER
Pt son states that he is having a problem with the company for the wound care. Pts son states that they have not come out to the home and having problems with getting supplies and he gets hard time with the [employer] when he calls, very unprofessional and then hang up on him. Pts son would like to know if pt can be referred to another company?

## 2022-09-15 ENCOUNTER — TELEPHONE (OUTPATIENT)
Dept: WOUND CARE | Facility: HOSPITAL | Age: 62
End: 2022-09-15

## 2022-09-15 NOTE — TELEPHONE ENCOUNTER
Emerson Smith, I think we are okay as patient is already seen in the wound care center and follows with the team there. We usually manage home health orders through the wound care center. Thank you.

## 2022-09-15 NOTE — TELEPHONE ENCOUNTER
Called Son. He states his Mom's having trouble with Increased sugars, High blood pressure and no appetite. He thinks it is due to the antibiotic and is skipping doses then she feels better. I insisted they call her primary doctor 3x due to her symptoms. Her primary is Dr. Lavetta Cranker from [facility]. They are very unhappy with the [address] [facility] .  I will call them back and have them check with their insurance about another home health and if she gets worse she needs to go to the hospital; Should I insist they continue this antibiotic  Please advise   Thanks

## 2022-09-15 NOTE — TELEPHONE ENCOUNTER
Would someone reach out to family member, Devika Plasencia at [phone] regarding Lenny? They called [place] office and  is not here at this time.

## 2022-09-15 NOTE — TELEPHONE ENCOUNTER
RVM requesting home care company be changed d./t not being satisfied with service. Carroll Guerra who reports they do not accept patients insurance. Orders faxed to [facility] as they could not verify over the phone if they accepted patients insurance.

## 2022-09-16 ENCOUNTER — TELEPHONE (OUTPATIENT)
Dept: WOUND CARE | Facility: HOSPITAL | Age: 62
End: 2022-09-16

## 2022-09-16 NOTE — TELEPHONE ENCOUNTER
Noted.  Thank you. I agree, if any acute signs of infection or systemic signs of infection are noted patient should go to the hospital.  She does have wound care visit on Monday where we can follow-up with her home health situation as well.

## 2022-09-16 NOTE — TELEPHONE ENCOUNTER
Spoke with Cory Hussein who reports that due to patients sons personality and confrontational nature nursing staff have refused to continue to go to residence. She states they have purchased and sent at least one month of supplies to the patient. Fax sent to [facility] with insurance information and wound care orders. Awaiting response.

## 2022-09-19 ENCOUNTER — APPOINTMENT (OUTPATIENT)
Dept: WOUND CARE | Facility: HOSPITAL | Age: 62
End: 2022-09-19
Attending: STUDENT IN AN ORGANIZED HEALTH CARE EDUCATION/TRAINING PROGRAM
Payer: MEDICAID

## 2022-09-19 ENCOUNTER — TELEPHONE (OUTPATIENT)
Dept: WOUND CARE | Facility: HOSPITAL | Age: 62
End: 2022-09-19

## 2022-09-19 NOTE — TELEPHONE ENCOUNTER
Attempted to call patient/patient's son as they did not show for visit. Unable to leave voicemail and could not get a hold of them. It is important for them to follow-up given wound probes deeply in patient's foot. Will reach out to try to schedule follow-up visit.

## 2022-09-21 ENCOUNTER — APPOINTMENT (OUTPATIENT)
Dept: WOUND CARE | Facility: HOSPITAL | Age: 62
End: 2022-09-21
Attending: STUDENT IN AN ORGANIZED HEALTH CARE EDUCATION/TRAINING PROGRAM
Payer: MEDICAID

## 2022-09-26 ENCOUNTER — APPOINTMENT (OUTPATIENT)
Dept: WOUND CARE | Facility: HOSPITAL | Age: 62
End: 2022-09-26
Attending: STUDENT IN AN ORGANIZED HEALTH CARE EDUCATION/TRAINING PROGRAM
Payer: MEDICAID

## 2022-09-26 ENCOUNTER — TELEPHONE (OUTPATIENT)
Dept: WOUND CARE | Facility: HOSPITAL | Age: 62
End: 2022-09-26

## 2022-09-26 NOTE — TELEPHONE ENCOUNTER
Spoke with patient's son on telephone. Patient is once again missed appointment for the second consecutive week in a row. Patient's son relates that they have discontinued IV antibiotics in their own as his mom did not seem to be tolerating them well. He denies any acute signs of infection or reactions to them. He was given information for Dr. Nikki Cain office to follow-up with her. Stressed importance of continued follow-up for patient's mom's wound. Discussed that she is at risk for foot surgery/losing her foot or portion of her leg. Patient relates that transportation has been an issue for them and they are interested in finding care more local where they do not to drive as much. Our  will reach out to try to provide information for wound care centers closer to patient's house. Stressed importance of continued follow-up. Advised patient to seek medical attention for any acute signs infection or other concerns arise.

## 2022-10-03 ENCOUNTER — APPOINTMENT (OUTPATIENT)
Dept: WOUND CARE | Facility: HOSPITAL | Age: 62
End: 2022-10-03
Attending: STUDENT IN AN ORGANIZED HEALTH CARE EDUCATION/TRAINING PROGRAM
Payer: MEDICAID

## 2022-10-10 ENCOUNTER — APPOINTMENT (OUTPATIENT)
Dept: WOUND CARE | Facility: HOSPITAL | Age: 62
End: 2022-10-10
Attending: STUDENT IN AN ORGANIZED HEALTH CARE EDUCATION/TRAINING PROGRAM
Payer: MEDICAID

## 2022-10-17 ENCOUNTER — APPOINTMENT (OUTPATIENT)
Dept: WOUND CARE | Facility: HOSPITAL | Age: 62
End: 2022-10-17
Attending: STUDENT IN AN ORGANIZED HEALTH CARE EDUCATION/TRAINING PROGRAM
Payer: MEDICAID

## 2022-11-09 ENCOUNTER — OFFICE VISIT (OUTPATIENT)
Dept: CARDIOLOGY | Age: 62
End: 2022-11-09

## 2022-11-09 VITALS
BODY MASS INDEX: 29.57 KG/M2 | DIASTOLIC BLOOD PRESSURE: 75 MMHG | HEIGHT: 63 IN | SYSTOLIC BLOOD PRESSURE: 139 MMHG | HEART RATE: 68 BPM | WEIGHT: 166.89 LBS

## 2022-11-09 DIAGNOSIS — N18.9 CHRONIC KIDNEY DISEASE, UNSPECIFIED CKD STAGE: ICD-10-CM

## 2022-11-09 DIAGNOSIS — E78.5 DYSLIPIDEMIA: ICD-10-CM

## 2022-11-09 DIAGNOSIS — I10 HTN (HYPERTENSION), BENIGN: ICD-10-CM

## 2022-11-09 DIAGNOSIS — Z95.1 S/P CABG (CORONARY ARTERY BYPASS GRAFT): ICD-10-CM

## 2022-11-09 DIAGNOSIS — I25.10 CORONARY ARTERY DISEASE INVOLVING NATIVE HEART WITHOUT ANGINA PECTORIS, UNSPECIFIED VESSEL OR LESION TYPE: Primary | ICD-10-CM

## 2022-11-09 PROBLEM — E11.9 TYPE 2 DIABETES MELLITUS WITHOUT COMPLICATION, WITHOUT LONG-TERM CURRENT USE OF INSULIN (CMD): Status: ACTIVE | Noted: 2022-03-31

## 2022-11-09 PROCEDURE — 3078F DIAST BP <80 MM HG: CPT | Performed by: SPECIALIST

## 2022-11-09 PROCEDURE — 3075F SYST BP GE 130 - 139MM HG: CPT | Performed by: SPECIALIST

## 2022-11-09 PROCEDURE — 99214 OFFICE O/P EST MOD 30 MIN: CPT | Performed by: SPECIALIST

## 2022-11-09 SDOH — HEALTH STABILITY: PHYSICAL HEALTH: ON AVERAGE, HOW MANY MINUTES DO YOU ENGAGE IN EXERCISE AT THIS LEVEL?: 10 MIN

## 2022-11-09 SDOH — HEALTH STABILITY: PHYSICAL HEALTH: ON AVERAGE, HOW MANY DAYS PER WEEK DO YOU ENGAGE IN MODERATE TO STRENUOUS EXERCISE (LIKE A BRISK WALK)?: 2 DAYS

## 2022-11-09 ASSESSMENT — PATIENT HEALTH QUESTIONNAIRE - PHQ9
SUM OF ALL RESPONSES TO PHQ9 QUESTIONS 1 AND 2: 0
2. FEELING DOWN, DEPRESSED OR HOPELESS: NOT AT ALL
1. LITTLE INTEREST OR PLEASURE IN DOING THINGS: NOT AT ALL
CLINICAL INTERPRETATION OF PHQ2 SCORE: NO FURTHER SCREENING NEEDED
SUM OF ALL RESPONSES TO PHQ9 QUESTIONS 1 AND 2: 0

## 2022-11-29 ENCOUNTER — TELEPHONE (OUTPATIENT)
Dept: CARDIOLOGY | Age: 62
End: 2022-11-29

## 2022-11-29 RX ORDER — ISOSORBIDE MONONITRATE 60 MG/1
60 TABLET, EXTENDED RELEASE ORAL DAILY
Qty: 30 TABLET | Refills: 2 | Status: SHIPPED | OUTPATIENT
Start: 2022-11-29 | End: 2022-12-29

## 2023-03-10 ENCOUNTER — OFFICE VISIT (OUTPATIENT)
Dept: CARDIOLOGY | Age: 63
End: 2023-03-10

## 2023-03-10 VITALS
WEIGHT: 170.86 LBS | SYSTOLIC BLOOD PRESSURE: 134 MMHG | BODY MASS INDEX: 30.27 KG/M2 | HEART RATE: 69 BPM | HEIGHT: 63 IN | DIASTOLIC BLOOD PRESSURE: 75 MMHG

## 2023-03-10 DIAGNOSIS — N18.9 CHRONIC KIDNEY DISEASE, UNSPECIFIED CKD STAGE: ICD-10-CM

## 2023-03-10 DIAGNOSIS — I10 HTN (HYPERTENSION), BENIGN: ICD-10-CM

## 2023-03-10 DIAGNOSIS — E11.9 TYPE 2 DIABETES MELLITUS WITHOUT COMPLICATION, WITHOUT LONG-TERM CURRENT USE OF INSULIN (CMD): ICD-10-CM

## 2023-03-10 DIAGNOSIS — E78.5 DYSLIPIDEMIA: ICD-10-CM

## 2023-03-10 DIAGNOSIS — I25.10 CORONARY ARTERY DISEASE INVOLVING NATIVE HEART WITHOUT ANGINA PECTORIS, UNSPECIFIED VESSEL OR LESION TYPE: Primary | ICD-10-CM

## 2023-03-10 PROCEDURE — 3075F SYST BP GE 130 - 139MM HG: CPT | Performed by: SPECIALIST

## 2023-03-10 PROCEDURE — 3078F DIAST BP <80 MM HG: CPT | Performed by: SPECIALIST

## 2023-03-10 PROCEDURE — 99214 OFFICE O/P EST MOD 30 MIN: CPT | Performed by: SPECIALIST

## 2023-03-10 SDOH — HEALTH STABILITY: PHYSICAL HEALTH: ON AVERAGE, HOW MANY MINUTES DO YOU ENGAGE IN EXERCISE AT THIS LEVEL?: 0 MIN

## 2023-03-10 SDOH — HEALTH STABILITY: PHYSICAL HEALTH: ON AVERAGE, HOW MANY DAYS PER WEEK DO YOU ENGAGE IN MODERATE TO STRENUOUS EXERCISE (LIKE A BRISK WALK)?: 0 DAYS

## 2023-03-10 ASSESSMENT — PATIENT HEALTH QUESTIONNAIRE - PHQ9
SUM OF ALL RESPONSES TO PHQ9 QUESTIONS 1 AND 2: 0
CLINICAL INTERPRETATION OF PHQ2 SCORE: NO FURTHER SCREENING NEEDED
2. FEELING DOWN, DEPRESSED OR HOPELESS: NOT AT ALL
1. LITTLE INTEREST OR PLEASURE IN DOING THINGS: NOT AT ALL
SUM OF ALL RESPONSES TO PHQ9 QUESTIONS 1 AND 2: 0

## 2023-08-21 ENCOUNTER — E-ADVICE (OUTPATIENT)
Dept: CARDIOLOGY | Age: 63
End: 2023-08-21

## 2023-09-13 ENCOUNTER — APPOINTMENT (OUTPATIENT)
Dept: CARDIOLOGY | Age: 63
End: 2023-09-13

## 2023-09-14 ENCOUNTER — OFFICE VISIT (OUTPATIENT)
Dept: CARDIOLOGY | Age: 63
End: 2023-09-14

## 2023-09-14 VITALS
HEIGHT: 63 IN | DIASTOLIC BLOOD PRESSURE: 79 MMHG | SYSTOLIC BLOOD PRESSURE: 145 MMHG | BODY MASS INDEX: 34.79 KG/M2 | HEART RATE: 70 BPM | WEIGHT: 196.32 LBS

## 2023-09-14 DIAGNOSIS — N18.31 STAGE 3A CHRONIC KIDNEY DISEASE (CMD): ICD-10-CM

## 2023-09-14 DIAGNOSIS — E78.5 DYSLIPIDEMIA: ICD-10-CM

## 2023-09-14 DIAGNOSIS — E11.9 TYPE 2 DIABETES MELLITUS WITHOUT COMPLICATION, WITHOUT LONG-TERM CURRENT USE OF INSULIN (CMD): ICD-10-CM

## 2023-09-14 DIAGNOSIS — I10 HTN (HYPERTENSION), BENIGN: Primary | ICD-10-CM

## 2023-09-14 DIAGNOSIS — I25.10 CORONARY ARTERY DISEASE INVOLVING NATIVE HEART WITHOUT ANGINA PECTORIS, UNSPECIFIED VESSEL OR LESION TYPE: ICD-10-CM

## 2023-09-14 PROBLEM — S91.339A PENETRATING FOOT WOUND: Status: ACTIVE | Noted: 2023-09-14

## 2023-09-14 PROCEDURE — 3078F DIAST BP <80 MM HG: CPT | Performed by: SPECIALIST

## 2023-09-14 PROCEDURE — 99214 OFFICE O/P EST MOD 30 MIN: CPT | Performed by: SPECIALIST

## 2023-09-14 PROCEDURE — 3077F SYST BP >= 140 MM HG: CPT | Performed by: SPECIALIST

## 2023-09-14 SDOH — HEALTH STABILITY: PHYSICAL HEALTH: ON AVERAGE, HOW MANY MINUTES DO YOU ENGAGE IN EXERCISE AT THIS LEVEL?: 10 MIN

## 2023-09-14 SDOH — HEALTH STABILITY: PHYSICAL HEALTH: ON AVERAGE, HOW MANY DAYS PER WEEK DO YOU ENGAGE IN MODERATE TO STRENUOUS EXERCISE (LIKE A BRISK WALK)?: 5 DAYS

## 2023-09-14 ASSESSMENT — PATIENT HEALTH QUESTIONNAIRE - PHQ9
SUM OF ALL RESPONSES TO PHQ9 QUESTIONS 1 AND 2: 0
1. LITTLE INTEREST OR PLEASURE IN DOING THINGS: NOT AT ALL
SUM OF ALL RESPONSES TO PHQ9 QUESTIONS 1 AND 2: 0
CLINICAL INTERPRETATION OF PHQ2 SCORE: NO FURTHER SCREENING NEEDED
2. FEELING DOWN, DEPRESSED OR HOPELESS: NOT AT ALL

## 2023-10-01 ENCOUNTER — APPOINTMENT (OUTPATIENT)
Dept: GENERAL RADIOLOGY | Age: 63
End: 2023-10-01
Attending: EMERGENCY MEDICINE

## 2023-10-01 ENCOUNTER — HOSPITAL ENCOUNTER (EMERGENCY)
Age: 63
Discharge: HOME | End: 2023-10-01
Attending: EMERGENCY MEDICINE

## 2023-10-01 VITALS
HEIGHT: 61 IN | BODY MASS INDEX: 32.1 KG/M2 | RESPIRATION RATE: 22 BRPM | DIASTOLIC BLOOD PRESSURE: 74 MMHG | SYSTOLIC BLOOD PRESSURE: 172 MMHG | TEMPERATURE: 98 F | WEIGHT: 170 LBS | HEART RATE: 77 BPM | OXYGEN SATURATION: 97 %

## 2023-10-01 DIAGNOSIS — E11.628 DIABETIC FOOT INFECTION: Primary | ICD-10-CM

## 2023-10-01 DIAGNOSIS — L08.9 DIABETIC FOOT INFECTION: Primary | ICD-10-CM

## 2023-10-01 LAB
ALBUMIN SERPL-MCNC: 3 G/DL (ref 3.4–5)
ALBUMIN/GLOB SERPL: 0.6 {RATIO} (ref 1–2)
ALP LIVER SERPL-CCNC: 95 U/L
ALT SERPL-CCNC: 20 U/L
ANION GAP SERPL CALC-SCNC: 4 MMOL/L (ref 0–18)
AST SERPL-CCNC: 14 U/L (ref 15–37)
BASOPHILS # BLD AUTO: 0.03 X10(3) UL (ref 0–0.2)
BASOPHILS NFR BLD AUTO: 0.3 %
BILIRUB SERPL-MCNC: 0.3 MG/DL (ref 0.1–2)
BUN BLD-MCNC: 17 MG/DL (ref 7–18)
CALCIUM BLD-MCNC: 9.1 MG/DL (ref 8.5–10.1)
CHLORIDE SERPL-SCNC: 101 MMOL/L (ref 98–112)
CO2 SERPL-SCNC: 29 MMOL/L (ref 21–32)
CREAT BLD-MCNC: 1.15 MG/DL
EGFRCR SERPLBLD CKD-EPI 2021: 53 ML/MIN/1.73M2 (ref 60–?)
EOSINOPHIL # BLD AUTO: 0.09 X10(3) UL (ref 0–0.7)
EOSINOPHIL NFR BLD AUTO: 0.8 %
ERYTHROCYTE [DISTWIDTH] IN BLOOD BY AUTOMATED COUNT: 13.7 %
GLOBULIN PLAS-MCNC: 5.2 G/DL (ref 2.8–4.4)
GLUCOSE BLD-MCNC: 116 MG/DL (ref 70–99)
GLUCOSE BLD-MCNC: 138 MG/DL (ref 70–99)
HCT VFR BLD AUTO: 37.8 %
HGB BLD-MCNC: 12 G/DL
IMM GRANULOCYTES # BLD AUTO: 0.04 X10(3) UL (ref 0–1)
IMM GRANULOCYTES NFR BLD: 0.4 %
LACTATE SERPL-SCNC: 2 MMOL/L (ref 0.4–2)
LYMPHOCYTES # BLD AUTO: 1.87 X10(3) UL (ref 1–4)
LYMPHOCYTES NFR BLD AUTO: 16.8 %
MCH RBC QN AUTO: 26.7 PG (ref 26–34)
MCHC RBC AUTO-ENTMCNC: 31.7 G/DL (ref 31–37)
MCV RBC AUTO: 84 FL
MONOCYTES # BLD AUTO: 1.08 X10(3) UL (ref 0.1–1)
MONOCYTES NFR BLD AUTO: 9.7 %
NEUTROPHILS # BLD AUTO: 8 X10 (3) UL (ref 1.5–7.7)
NEUTROPHILS # BLD AUTO: 8 X10(3) UL (ref 1.5–7.7)
NEUTROPHILS NFR BLD AUTO: 72 %
OSMOLALITY SERPL CALC.SUM OF ELEC: 281 MOSM/KG (ref 275–295)
PLATELET # BLD AUTO: 362 10(3)UL (ref 150–450)
POTASSIUM SERPL-SCNC: 3.7 MMOL/L (ref 3.5–5.1)
PROT SERPL-MCNC: 8.2 G/DL (ref 6.4–8.2)
RBC # BLD AUTO: 4.5 X10(6)UL
SODIUM SERPL-SCNC: 134 MMOL/L (ref 136–145)
WBC # BLD AUTO: 11.1 X10(3) UL (ref 4–11)

## 2023-10-01 PROCEDURE — 87186 SC STD MICRODIL/AGAR DIL: CPT | Performed by: EMERGENCY MEDICINE

## 2023-10-01 PROCEDURE — 83605 ASSAY OF LACTIC ACID: CPT | Performed by: EMERGENCY MEDICINE

## 2023-10-01 PROCEDURE — 87070 CULTURE OTHR SPECIMN AEROBIC: CPT | Performed by: EMERGENCY MEDICINE

## 2023-10-01 PROCEDURE — 87040 BLOOD CULTURE FOR BACTERIA: CPT | Performed by: EMERGENCY MEDICINE

## 2023-10-01 PROCEDURE — 87147 CULTURE TYPE IMMUNOLOGIC: CPT | Performed by: EMERGENCY MEDICINE

## 2023-10-01 PROCEDURE — 87077 CULTURE AEROBIC IDENTIFY: CPT | Performed by: EMERGENCY MEDICINE

## 2023-10-01 PROCEDURE — 99284 EMERGENCY DEPT VISIT MOD MDM: CPT

## 2023-10-01 PROCEDURE — 99285 EMERGENCY DEPT VISIT HI MDM: CPT

## 2023-10-01 PROCEDURE — 96365 THER/PROPH/DIAG IV INF INIT: CPT

## 2023-10-01 PROCEDURE — 87205 SMEAR GRAM STAIN: CPT | Performed by: EMERGENCY MEDICINE

## 2023-10-01 PROCEDURE — 73630 X-RAY EXAM OF FOOT: CPT | Performed by: EMERGENCY MEDICINE

## 2023-10-01 PROCEDURE — 10160 PNXR ASPIR ABSC HMTMA BULLA: CPT

## 2023-10-01 PROCEDURE — 36415 COLL VENOUS BLD VENIPUNCTURE: CPT

## 2023-10-01 PROCEDURE — 82962 GLUCOSE BLOOD TEST: CPT

## 2023-10-01 PROCEDURE — 85025 COMPLETE CBC W/AUTO DIFF WBC: CPT | Performed by: EMERGENCY MEDICINE

## 2023-10-01 PROCEDURE — 80053 COMPREHEN METABOLIC PANEL: CPT | Performed by: EMERGENCY MEDICINE

## 2023-10-01 RX ORDER — CLINDAMYCIN HYDROCHLORIDE 300 MG/1
300 CAPSULE ORAL 3 TIMES DAILY
Qty: 30 CAPSULE | Refills: 0 | Status: SHIPPED | OUTPATIENT
Start: 2023-10-01 | End: 2023-10-11

## 2023-10-01 RX ORDER — CLINDAMYCIN PHOSPHATE 600 MG/50ML
600 INJECTION INTRAVENOUS ONCE
Status: COMPLETED | OUTPATIENT
Start: 2023-10-01 | End: 2023-10-01

## 2023-10-01 NOTE — ED INITIAL ASSESSMENT (HPI)
Fever for past 24 hours. States chronic left leg wound. Reports elevated bp and blood sugar.   Yesterday was last time she took meds for pain

## 2023-10-16 ENCOUNTER — OFFICE VISIT (OUTPATIENT)
Dept: WOUND CARE | Facility: HOSPITAL | Age: 63
End: 2023-10-16
Attending: PODIATRIST
Payer: MEDICARE

## 2023-10-16 VITALS
RESPIRATION RATE: 16 BRPM | TEMPERATURE: 98 F | DIASTOLIC BLOOD PRESSURE: 66 MMHG | BODY MASS INDEX: 36.68 KG/M2 | SYSTOLIC BLOOD PRESSURE: 131 MMHG | HEART RATE: 76 BPM | HEIGHT: 57 IN | WEIGHT: 170 LBS

## 2023-10-16 DIAGNOSIS — M14.672 CHARCOT'S JOINT OF LEFT FOOT: ICD-10-CM

## 2023-10-16 DIAGNOSIS — L97.524 FOOT ULCER, LEFT, WITH NECROSIS OF BONE (HCC): Primary | ICD-10-CM

## 2023-10-16 DIAGNOSIS — E11.42 DIABETIC POLYNEUROPATHY ASSOCIATED WITH TYPE 2 DIABETES MELLITUS (HCC): ICD-10-CM

## 2023-10-16 DIAGNOSIS — E11.621 DIABETIC ULCER OF LEFT FOOT ASSOCIATED WITH TYPE 2 DIABETES MELLITUS, WITH FAT LAYER EXPOSED, UNSPECIFIED PART OF FOOT (HCC): ICD-10-CM

## 2023-10-16 DIAGNOSIS — L97.522 DIABETIC ULCER OF LEFT FOOT ASSOCIATED WITH TYPE 2 DIABETES MELLITUS, WITH FAT LAYER EXPOSED, UNSPECIFIED PART OF FOOT (HCC): ICD-10-CM

## 2023-10-16 LAB — GLUCOSE BLD-MCNC: 177 MG/DL (ref 70–99)

## 2023-10-16 PROCEDURE — 11042 DBRDMT SUBQ TIS 1ST 20SQCM/<: CPT | Performed by: PODIATRIST

## 2023-10-16 NOTE — PATIENT INSTRUCTIONS
For [employer],  1960    Date of Service 10/16/2023    -Change dressings every other day with Winnie and Hydrofera Blue  -Remain weightbearing as tolerated in Darco offloading shoe  -Obtain x-rays of left foot before next visit  -Contact our office with any questions/concerns  -If wound worsens and there are signs of infection, seek immediate medical attention    Follow up in 1 week with Dr. Kin Ferguson

## 2023-10-16 NOTE — PROGRESS NOTES
Patient ID: Brigido Holter is a 70year old female. Debridement Diabetic Ulcer Left;Plantar   Wound 10/16/23 #1 Left plantar foot Left;Plantar    Performed by: Laura Martinez DPM  Authorized by: Laura Martinez DPM      Consent   Consent obtained? verbal  Consent given by: patient  Risks discussed? procedural risks discussed  Time out called at 10/16/2023 3:23 PM  Immediately prior to the procedure a time out was called and the performing provider verified the correct patient, procedure, equipment, support staff, and site/side marked as required. Debridement Details  Performed by: physician  Debridement type: surgical  Level of debridement: subcutaneous tissue    Pre-debridement measurements  Length (cm): 0.4  Width (cm): 0.1  Depth (cm): 0.1  Surface Area (cm^2): 0.04    Post-debridement measurements  Length (cm): 0.5  Width (cm): 0.5  Depth (cm): 2  Percent debrided: 100%  Surface Area (cm^2): 0.25  Area Debrided (cm^2): 0.25  Volume (cm^3): 0.5    Tissue and other material debrided: subcutaneous tissue  Devitalized tissue debrided: biofilm, callus and fibrin  Instrument(s) utilized: nippers  Bleeding: small  Hemostasis obtained with: not applicable  Procedural pain (0-10): 0  Post-procedural pain: 0   Response to treatment: procedure was tolerated well    Debridement Diabetic Ulcer Left;Lateral Foot   Wound 10/16/23 #2 Left lateral foot Foot Left;Lateral    Performed by: Laura Martinez DPM  Authorized by: Laura Martinez DPM      Consent   Consent obtained? verbal  Consent given by: patient  Risks discussed? procedural risks discussed  Time out called at 10/16/2023 3:37 PM  Immediately prior to the procedure a time out was called and the performing provider verified the correct patient, procedure, equipment, support staff, and site/side marked as required.     Debridement Details  Performed by: physician  Debridement type: surgical  Level of debridement: subcutaneous tissue    Pre-debridement measurements  Length (cm): 0.8  Width (cm): 0.4  Depth (cm): 0.1  Surface Area (cm^2): 0.32    Post-debridement measurements  Length (cm): 0.9  Width (cm): 0.5  Depth (cm): 0.1  Percent debrided: 100%  Surface Area (cm^2): 0.45  Area Debrided (cm^2): 0.45  Volume (cm^3): 0.05    Tissue and other material debrided: subcutaneous tissue  Devitalized tissue debrided: biofilm and callus  Instrument(s) utilized: nippers  Bleeding: small  Hemostasis obtained with: not applicable  Procedural pain (0-10): 0  Post-procedural pain: 0   Response to treatment: procedure was tolerated well

## 2023-10-16 NOTE — PROGRESS NOTES
Weekly Wound Education Note    Teaching Provided To: Patient; Family  Training Topics: Discharge instructions;Dressing;Diabetes; Off-loading  Training Method: Demonstration;Explain/Verbal;Written  Training Response: Patient responds and understands        Notes: Left plantar and left lateral foot wounds: cleanse with saline or wound cleanser, apply Winnie, Hydrofera Transfer gauze, tape. Change every other day. Felted foam pad to periwoud secured with tape. Leave in place and change only if soilded. Darco offloading shoe with insert provided today. Use Darco shoe when your walk and stand however avoid excessive weightbearing. Reviewed importance of controlling blood glucose for wound healing. Reviewed s/s acute infection and to seek immediate medical attention if concerns arise.

## 2023-10-23 ENCOUNTER — OFFICE VISIT (OUTPATIENT)
Dept: WOUND CARE | Facility: HOSPITAL | Age: 63
End: 2023-10-23
Attending: PODIATRIST
Payer: MEDICARE

## 2023-10-23 VITALS
HEART RATE: 91 BPM | DIASTOLIC BLOOD PRESSURE: 81 MMHG | SYSTOLIC BLOOD PRESSURE: 144 MMHG | TEMPERATURE: 98 F | RESPIRATION RATE: 18 BRPM

## 2023-10-23 DIAGNOSIS — E11.621 DIABETIC ULCER OF LEFT FOOT ASSOCIATED WITH TYPE 2 DIABETES MELLITUS, WITH FAT LAYER EXPOSED, UNSPECIFIED PART OF FOOT (HCC): Primary | ICD-10-CM

## 2023-10-23 DIAGNOSIS — L97.524 FOOT ULCER, LEFT, WITH NECROSIS OF BONE (HCC): ICD-10-CM

## 2023-10-23 DIAGNOSIS — E11.42 DIABETIC POLYNEUROPATHY ASSOCIATED WITH TYPE 2 DIABETES MELLITUS (HCC): ICD-10-CM

## 2023-10-23 DIAGNOSIS — M14.672 CHARCOT'S JOINT OF LEFT FOOT: ICD-10-CM

## 2023-10-23 DIAGNOSIS — L97.522 DIABETIC ULCER OF LEFT FOOT ASSOCIATED WITH TYPE 2 DIABETES MELLITUS, WITH FAT LAYER EXPOSED, UNSPECIFIED PART OF FOOT (HCC): Primary | ICD-10-CM

## 2023-10-23 LAB — GLUCOSE BLD-MCNC: 168 MG/DL (ref 70–99)

## 2023-10-23 PROCEDURE — 11042 DBRDMT SUBQ TIS 1ST 20SQCM/<: CPT | Performed by: PODIATRIST

## 2023-10-23 NOTE — PROGRESS NOTES
Patient ID: Artie Rogers is a 70year old female. Debridement Diabetic Ulcer Left;Plantar   Wound 10/16/23 #1 Left plantar foot Left;Plantar    Performed by: Jenna Castro DPM  Authorized by: Jenna Castro DPM      Consent   Consent obtained? verbal  Consent given by: patient  Risks discussed? procedural risks discussed  Time out called at 10/23/2023 12:11 PM  Immediately prior to the procedure a time out was called and the performing provider verified the correct patient, procedure, equipment, support staff, and site/side marked as required. Debridement Details  Performed by: physician  Debridement type: surgical  Level of debridement: subcutaneous tissue  Pain control: lidocaine 4%  Pain control administration type: topical    Pre-debridement measurements  Length (cm): 0.4  Width (cm): 0.2  Depth (cm): 2  Surface Area (cm^2): 0.08    Post-debridement measurements  Length (cm): 0.5  Width (cm): 0.3  Depth (cm): 2  Percent debrided: 100%  Surface Area (cm^2): 0.15  Area Debrided (cm^2): 0.15  Volume (cm^3): 0.3    Tissue and other material debrided: subcutaneous tissue  Devitalized tissue debrided: biofilm, callus and fibrin  Instrument(s) utilized: nippers  Bleeding: small  Hemostasis obtained with: not applicable  Procedural pain (0-10): 0  Post-procedural pain: 0   Response to treatment: procedure was tolerated well        .

## 2023-10-23 NOTE — PROGRESS NOTES
Weekly Wound Education Note    Teaching Provided To: Patient  Training Topics: Discharge instructions;Dressing;Cleasing and general instructions  Training Method: Demonstration;Explain/Verbal;Written  Training Response: Patient responds and understands        Notes: Cleanse Left plantar foot wound with saline or wound cleanser, apply Winnie, Hydrofera Transfer, abd, kerlix. change every other day. Felted foam applied to periwound, change may leave in place and change if soiled. Continue Darco shoe when your walk or stand. Reviewed s/s acute infection and to seek immediate medical attention if concerns are noted.

## 2023-10-30 ENCOUNTER — APPOINTMENT (OUTPATIENT)
Dept: WOUND CARE | Facility: HOSPITAL | Age: 63
End: 2023-10-30
Attending: PODIATRIST
Payer: MEDICARE

## 2023-11-06 ENCOUNTER — OFFICE VISIT (OUTPATIENT)
Dept: WOUND CARE | Facility: HOSPITAL | Age: 63
End: 2023-11-06
Attending: PODIATRIST
Payer: MEDICARE

## 2023-11-06 VITALS
DIASTOLIC BLOOD PRESSURE: 76 MMHG | RESPIRATION RATE: 18 BRPM | TEMPERATURE: 98 F | HEART RATE: 71 BPM | SYSTOLIC BLOOD PRESSURE: 156 MMHG

## 2023-11-06 DIAGNOSIS — E11.621 DIABETIC ULCER OF LEFT FOOT ASSOCIATED WITH TYPE 2 DIABETES MELLITUS, WITH FAT LAYER EXPOSED, UNSPECIFIED PART OF FOOT (HCC): Primary | ICD-10-CM

## 2023-11-06 DIAGNOSIS — L97.524 FOOT ULCER, LEFT, WITH NECROSIS OF BONE (HCC): ICD-10-CM

## 2023-11-06 DIAGNOSIS — E11.42 DIABETIC POLYNEUROPATHY ASSOCIATED WITH TYPE 2 DIABETES MELLITUS (HCC): ICD-10-CM

## 2023-11-06 DIAGNOSIS — L97.522 DIABETIC ULCER OF LEFT FOOT ASSOCIATED WITH TYPE 2 DIABETES MELLITUS, WITH FAT LAYER EXPOSED, UNSPECIFIED PART OF FOOT (HCC): Primary | ICD-10-CM

## 2023-11-06 DIAGNOSIS — M14.672 CHARCOT'S JOINT OF LEFT FOOT: ICD-10-CM

## 2023-11-06 LAB — GLUCOSE BLD-MCNC: 170 MG/DL (ref 70–99)

## 2023-11-06 PROCEDURE — 11042 DBRDMT SUBQ TIS 1ST 20SQCM/<: CPT | Performed by: PODIATRIST

## 2023-11-06 NOTE — PATIENT INSTRUCTIONS
For [facility],  1960    Date of Service 2023    -Change dressings 1 to 2 days with Winnie and Hydrofera Blue with offloading felt pad.   -Remain minimally weightbearing to left lower extremity in offloading Darco shoe  -Contact our office with any questions/concerns  -If wound worsens and there are signs of infection, seek immediate medical attention    Follow up in 2 weeks with Dr. Danna Jackson

## 2023-11-06 NOTE — PROGRESS NOTES
Patient ID: Mane Valero is a 70year old female. Debridement Diabetic Ulcer Left;Plantar   Wound 10/16/23 #1 Left plantar foot Left;Plantar    Performed by: Larissa Hawley DPM  Authorized by: Larissa Hawley DPM      Consent   Consent obtained? verbal  Consent given by: patient  Risks discussed? procedural risks discussed  Time out called at 11/6/2023 1:19 PM  Immediately prior to the procedure a time out was called and the performing provider verified the correct patient, procedure, equipment, support staff, and site/side marked as required.     Debridement Details  Performed by: physician  Debridement type: surgical  Level of debridement: subcutaneous tissue  Pain control: lidocaine 4%  Pain control administration type: topical    Pre-debridement measurements  Length (cm): 0.5  Width (cm): 0.1  Depth (cm): 0.2  Surface Area (cm^2): 0.05    Post-debridement measurements  Length (cm): 0.5  Width (cm): 0.3  Depth (cm): 2  Percent debrided: 100%  Surface Area (cm^2): 0.15  Area Debrided (cm^2): 0.15  Volume (cm^3): 0.3    Tissue and other material debrided: subcutaneous tissue  Devitalized tissue debrided: biofilm, callus and fibrin  Instrument(s) utilized: nippers and blade  Bleeding: small  Hemostasis obtained with: not applicable  Procedural pain (0-10): 0  Post-procedural pain: 0   Response to treatment: procedure was tolerated well

## 2023-11-07 NOTE — PROGRESS NOTES
Weekly Wound Education Note    Teaching Provided To: Patient; Family  Training Topics: Discharge instructions; Compression;Dressing;Off-loading  Training Method: Demonstration;Explain/Verbal;Written  Training Response: Patient responds and understands        Notes: Left plantar foot: cleanse with saline or wound cleanser, apply liz and hydrofera ready, secure with tape. Change every 2-3 days. Felted foam applied to periwound and may be left in place. E spanda applied, may removed at night and replaced in morning. darco shoe for offloading. encouraged to strictly limit walking.

## 2023-11-20 ENCOUNTER — TELEPHONE (OUTPATIENT)
Dept: WOUND CARE | Facility: HOSPITAL | Age: 63
End: 2023-11-20

## 2023-11-20 ENCOUNTER — APPOINTMENT (OUTPATIENT)
Dept: WOUND CARE | Facility: HOSPITAL | Age: 63
End: 2023-11-20
Attending: PODIATRIST
Payer: MEDICARE

## 2023-11-21 ENCOUNTER — HOSPITAL ENCOUNTER (OUTPATIENT)
Dept: NUCLEAR MEDICINE | Facility: HOSPITAL | Age: 63
Discharge: HOME | End: 2023-11-21
Attending: PODIATRIST
Payer: MEDICARE

## 2023-11-21 DIAGNOSIS — E11.42 DIABETIC POLYNEUROPATHY ASSOCIATED WITH TYPE 2 DIABETES MELLITUS (HCC): ICD-10-CM

## 2023-11-21 DIAGNOSIS — M14.672 CHARCOT'S JOINT OF LEFT FOOT: ICD-10-CM

## 2023-11-21 DIAGNOSIS — E11.621 DIABETIC ULCER OF LEFT FOOT ASSOCIATED WITH TYPE 2 DIABETES MELLITUS, WITH FAT LAYER EXPOSED, UNSPECIFIED PART OF FOOT (HCC): ICD-10-CM

## 2023-11-21 DIAGNOSIS — L97.522 DIABETIC ULCER OF LEFT FOOT ASSOCIATED WITH TYPE 2 DIABETES MELLITUS, WITH FAT LAYER EXPOSED, UNSPECIFIED PART OF FOOT (HCC): ICD-10-CM

## 2023-11-21 DIAGNOSIS — L97.524 FOOT ULCER, LEFT, WITH NECROSIS OF BONE (HCC): ICD-10-CM

## 2023-11-21 PROCEDURE — 78315 BONE IMAGING 3 PHASE: CPT | Performed by: PODIATRIST

## 2023-11-27 ENCOUNTER — APPOINTMENT (OUTPATIENT)
Dept: WOUND CARE | Facility: HOSPITAL | Age: 63
End: 2023-11-27
Attending: PODIATRIST
Payer: MEDICARE

## 2023-11-27 ENCOUNTER — TELEPHONE (OUTPATIENT)
Dept: WOUND CARE | Facility: HOSPITAL | Age: 63
End: 2023-11-27

## 2024-02-08 NOTE — PROGRESS NOTES
[facility]  Progress Note     Bi Andrews Patient Status:  Inpatient    1960 MRN [id number]   [facility] 3NW-A Attending Pieter Claros MD   Hosp Day # 1 PCP None Pcp     Chief Complaint: Pyelo    S: Patient feels nauseous, 04/15/21  1328   TROP <0.045   PBNP 946*       Creatinine Kinase  No results for input(s): CK in the last 168 hours. Inflammatory Markers  No results for input(s): CRP, KIANA, LDH, DDIMER in the last 168 hours. Imaging: Imaging data reviewed in Epic. yes

## 2024-03-14 ENCOUNTER — APPOINTMENT (OUTPATIENT)
Dept: CARDIOLOGY | Age: 64
End: 2024-03-14

## 2024-04-23 ENCOUNTER — HOSPITAL ENCOUNTER (EMERGENCY)
Age: 64
Discharge: HOME | End: 2024-04-23
Attending: EMERGENCY MEDICINE
Payer: MEDICARE

## 2024-04-23 ENCOUNTER — APPOINTMENT (OUTPATIENT)
Dept: GENERAL RADIOLOGY | Age: 64
End: 2024-04-23
Attending: EMERGENCY MEDICINE
Payer: MEDICARE

## 2024-04-23 ENCOUNTER — APPOINTMENT (OUTPATIENT)
Dept: CT IMAGING | Age: 64
End: 2024-04-23
Attending: EMERGENCY MEDICINE
Payer: MEDICARE

## 2024-04-23 VITALS
RESPIRATION RATE: 18 BRPM | OXYGEN SATURATION: 96 % | HEIGHT: 59 IN | TEMPERATURE: 99 F | WEIGHT: 170 LBS | BODY MASS INDEX: 34.27 KG/M2 | HEART RATE: 79 BPM | SYSTOLIC BLOOD PRESSURE: 164 MMHG | DIASTOLIC BLOOD PRESSURE: 65 MMHG

## 2024-04-23 DIAGNOSIS — R31.9 URINARY TRACT INFECTION WITH HEMATURIA, SITE UNSPECIFIED: ICD-10-CM

## 2024-04-23 DIAGNOSIS — N39.0 URINARY TRACT INFECTION WITH HEMATURIA, SITE UNSPECIFIED: ICD-10-CM

## 2024-04-23 DIAGNOSIS — R10.9 ABDOMINAL PAIN, ACUTE: Primary | ICD-10-CM

## 2024-04-23 LAB
ALBUMIN SERPL-MCNC: 3.3 G/DL (ref 3.4–5)
ALBUMIN/GLOB SERPL: 0.6 {RATIO} (ref 1–2)
ALP LIVER SERPL-CCNC: 124 U/L
ALT SERPL-CCNC: 17 U/L
ANION GAP SERPL CALC-SCNC: 7 MMOL/L (ref 0–18)
AST SERPL-CCNC: 25 U/L (ref 15–37)
BASOPHILS # BLD AUTO: 0.03 X10(3) UL (ref 0–0.2)
BASOPHILS NFR BLD AUTO: 0.2 %
BILIRUB SERPL-MCNC: 0.7 MG/DL (ref 0.1–2)
BILIRUB UR QL STRIP.AUTO: NEGATIVE
BUN BLD-MCNC: 14 MG/DL (ref 9–23)
CALCIUM BLD-MCNC: 9.7 MG/DL (ref 8.5–10.1)
CHLORIDE SERPL-SCNC: 101 MMOL/L (ref 98–112)
CO2 SERPL-SCNC: 28 MMOL/L (ref 21–32)
CREAT BLD-MCNC: 1.09 MG/DL
EGFRCR SERPLBLD CKD-EPI 2021: 56 ML/MIN/1.73M2 (ref 60–?)
EOSINOPHIL # BLD AUTO: 0.01 X10(3) UL (ref 0–0.7)
EOSINOPHIL NFR BLD AUTO: 0.1 %
ERYTHROCYTE [DISTWIDTH] IN BLOOD BY AUTOMATED COUNT: 15.2 %
GLOBULIN PLAS-MCNC: 5.1 G/DL (ref 2.8–4.4)
GLUCOSE BLD-MCNC: 261 MG/DL (ref 70–99)
GLUCOSE UR STRIP.AUTO-MCNC: 500 MG/DL
HCT VFR BLD AUTO: 41.1 %
HGB BLD-MCNC: 13 G/DL
IMM GRANULOCYTES # BLD AUTO: 0.05 X10(3) UL (ref 0–1)
IMM GRANULOCYTES NFR BLD: 0.4 %
KETONES UR STRIP.AUTO-MCNC: 40 MG/DL
LIPASE SERPL-CCNC: 15 U/L (ref 13–75)
LYMPHOCYTES # BLD AUTO: 1.79 X10(3) UL (ref 1–4)
LYMPHOCYTES NFR BLD AUTO: 12.7 %
MCH RBC QN AUTO: 26.5 PG (ref 26–34)
MCHC RBC AUTO-ENTMCNC: 31.6 G/DL (ref 31–37)
MCV RBC AUTO: 83.9 FL
MONOCYTES # BLD AUTO: 0.7 X10(3) UL (ref 0.1–1)
MONOCYTES NFR BLD AUTO: 5 %
NEUTROPHILS # BLD AUTO: 11.53 X10 (3) UL (ref 1.5–7.7)
NEUTROPHILS # BLD AUTO: 11.53 X10(3) UL (ref 1.5–7.7)
NEUTROPHILS NFR BLD AUTO: 81.6 %
NITRITE UR QL STRIP.AUTO: POSITIVE
OSMOLALITY SERPL CALC.SUM OF ELEC: 292 MOSM/KG (ref 275–295)
PH UR STRIP.AUTO: 7 [PH] (ref 5–8)
PLATELET # BLD AUTO: 295 10(3)UL (ref 150–450)
POTASSIUM SERPL-SCNC: 4.5 MMOL/L (ref 3.5–5.1)
PROT SERPL-MCNC: 8.4 G/DL (ref 6.4–8.2)
RBC # BLD AUTO: 4.9 X10(6)UL
SODIUM SERPL-SCNC: 136 MMOL/L (ref 136–145)
SP GR UR STRIP.AUTO: 1.01 (ref 1–1.03)
TROPONIN I SERPL HS-MCNC: 16 NG/L
UROBILINOGEN UR STRIP.AUTO-MCNC: 0.2 MG/DL
WBC # BLD AUTO: 14.1 X10(3) UL (ref 4–11)
WBC #/AREA URNS AUTO: >50 /HPF

## 2024-04-23 PROCEDURE — 93010 ELECTROCARDIOGRAM REPORT: CPT

## 2024-04-23 PROCEDURE — 99285 EMERGENCY DEPT VISIT HI MDM: CPT

## 2024-04-23 PROCEDURE — 87086 URINE CULTURE/COLONY COUNT: CPT | Performed by: EMERGENCY MEDICINE

## 2024-04-23 PROCEDURE — 83690 ASSAY OF LIPASE: CPT | Performed by: EMERGENCY MEDICINE

## 2024-04-23 PROCEDURE — 96360 HYDRATION IV INFUSION INIT: CPT

## 2024-04-23 PROCEDURE — 85025 COMPLETE CBC W/AUTO DIFF WBC: CPT | Performed by: EMERGENCY MEDICINE

## 2024-04-23 PROCEDURE — 80053 COMPREHEN METABOLIC PANEL: CPT | Performed by: EMERGENCY MEDICINE

## 2024-04-23 PROCEDURE — 87088 URINE BACTERIA CULTURE: CPT | Performed by: EMERGENCY MEDICINE

## 2024-04-23 PROCEDURE — 81015 MICROSCOPIC EXAM OF URINE: CPT | Performed by: EMERGENCY MEDICINE

## 2024-04-23 PROCEDURE — 84484 ASSAY OF TROPONIN QUANT: CPT | Performed by: EMERGENCY MEDICINE

## 2024-04-23 PROCEDURE — 74177 CT ABD & PELVIS W/CONTRAST: CPT | Performed by: EMERGENCY MEDICINE

## 2024-04-23 PROCEDURE — 71045 X-RAY EXAM CHEST 1 VIEW: CPT | Performed by: EMERGENCY MEDICINE

## 2024-04-23 PROCEDURE — 87186 SC STD MICRODIL/AGAR DIL: CPT | Performed by: EMERGENCY MEDICINE

## 2024-04-23 PROCEDURE — 93005 ELECTROCARDIOGRAM TRACING: CPT

## 2024-04-23 PROCEDURE — 81001 URINALYSIS AUTO W/SCOPE: CPT | Performed by: EMERGENCY MEDICINE

## 2024-04-23 RX ORDER — METOPROLOL SUCCINATE 25 MG/1
25 TABLET, EXTENDED RELEASE ORAL DAILY
COMMUNITY

## 2024-04-23 RX ORDER — SULFAMETHOXAZOLE AND TRIMETHOPRIM 800; 160 MG/1; MG/1
1 TABLET ORAL 2 TIMES DAILY
Qty: 20 TABLET | Refills: 0 | Status: SHIPPED | OUTPATIENT
Start: 2024-04-23 | End: 2024-05-03

## 2024-04-23 NOTE — ED PROVIDER NOTES
Patient Seen in: [facility] In [place]      History     Chief Complaint   Patient presents with    Abdomen/Flank Pain    Nausea/Vomiting/Diarrhea     Stated Complaint: vomiting- elevated BS and BP    Subjective:   HPI    Patient comes to the emergency department with abdominal pain, nausea, vomiting and diarrhea which has been present since last night.  The patient states the pain is a sharp sensation in the lower abdomen which is occasionally quite severe, but at a baseline and is moderate in intensity.  The pain does not radiate to any location.  The patient has had 2 loose bowel movements which have not been grossly watery.  There is been no blood.  Patient has had multiple episodes of emesis.  Patient has had no hematemesis.  Patient has felt somewhat chilled, but no specific fever has been noted.  She also has some epigastric pain concurrent with her abdominal pain.  The chest pain is of a vague character and stays in her epigastrium.  There is no radiation.    Objective:   Past Medical History:    Coronary bypass graft mechanical complication    Diabetes (HCC)    Disorder of thyroid    Esophageal reflux    Essential hypertension    High blood pressure    High cholesterol    Hyperlipemia              Past Surgical History:   Procedure Laterality Date    Cabg      Foot surgery Left     Fracture surgery Left     Ankle                Social History     Socioeconomic History    Marital status:    Tobacco Use    Smoking status: Never    Smokeless tobacco: Never   Vaping Use    Vaping status: Never Used   Substance and Sexual Activity    Alcohol use: Never    Drug use: Never     Social Determinants of Health     Physical Activity: Insufficiently Active (9/14/2023)    Received from [facility] [employer]    Exercise Vital Sign     On average, how many days per week do you engage in moderate to strenuous exercise (like a brisk walk)?: 5 days     On average, how many  minutes do you engage in exercise at this level?: 10 min              Review of Systems    Positive for stated complaint: vomiting- elevated BS and BP  Other systems are as noted in HPI.  Constitutional and vital signs reviewed.      All other systems reviewed and negative except as noted above.    Physical Exam     ED Triage Vitals [04/23/24 1420]   BP (!) 177/74   Pulse 75   Resp 18   Temp 98.6 °F (37 °C)   Temp src Temporal   SpO2 100 %   O2 Device None (Room air)       Current:BP (!) 164/65   Pulse 79   Temp 98.6 °F (37 °C) (Temporal)   Resp 18   Ht 149.9 cm (4' 11\")   Wt 77.1 kg   SpO2 96%   BMI 34.34 kg/m²         Physical Exam  Vitals and nursing note reviewed.   Constitutional:       General: She is not in acute distress.     Appearance: She is well-developed. She is not ill-appearing.   HENT:      Head: Normocephalic.   Cardiovascular:      Rate and Rhythm: Normal rate and regular rhythm.      Heart sounds: Normal heart sounds. No murmur heard.  Pulmonary:      Effort: Pulmonary effort is normal. No respiratory distress.      Breath sounds: Normal breath sounds.   Abdominal:      General: Bowel sounds are normal.      Palpations: Abdomen is soft.      Tenderness: There is abdominal tenderness. There is no rebound.      Comments: Mild generalized tenderness over the lower abdomen.  No peritoneal findings are noted.   Musculoskeletal:         General: No tenderness. Normal range of motion.      Cervical back: Normal range of motion and neck supple.   Lymphadenopathy:      Cervical: No cervical adenopathy.   Skin:     General: Skin is warm and dry.      Findings: No rash.      Comments: Patient is noted to have dark discoloration of her left foot which patient and son state is unchanged from baseline.  Feet are both warm to touch with normal sensation.  There is some edema on the left greater than the right, but this is apparently consistent with her baseline.  I was not able to detect a dorsalis pedis  pulse on the left, but again patient's exam is not consistent with an acute vascular occlusion.  Patient has a normal pulse on the right.   Neurological:      Mental Status: She is alert and oriented to person, place, and time.      Sensory: No sensory deficit.              ED Course     Labs Reviewed   URINALYSIS, ROUTINE - Abnormal; Notable for the following components:       Result Value    Clarity Urine Cloudy (*)     Glucose Urine 500 (*)     Ketones Urine 40 (*)     Blood Urine Moderate (*)     Protein Urine 100 mg/dL (*)     Nitrite Urine Positive (*)     Leukocyte Esterase Urine Large (*)     All other components within normal limits   COMP METABOLIC PANEL (14) - Abnormal; Notable for the following components:    Glucose 261 (*)     Creatinine 1.09 (*)     eGFR-Cr 56 (*)     Total Protein 8.4 (*)     Albumin 3.3 (*)     Globulin  5.1 (*)     A/G Ratio 0.6 (*)     All other components within normal limits   UA MICROSCOPIC ONLY, URINE - Abnormal; Notable for the following components:    WBC Urine >50 (*)     RBC Urine 3-5 (*)     Bacteria Urine 2+ (*)     All other components within normal limits   CBC W/ DIFFERENTIAL - Abnormal; Notable for the following components:    WBC 14.1 (*)     Neutrophil Absolute Prelim 11.53 (*)     Neutrophil Absolute 11.53 (*)     All other components within normal limits   LIPASE - Normal   TROPONIN I HIGH SENSITIVITY - Normal   CBC WITH DIFFERENTIAL WITH PLATELET    Narrative:     The following orders were created for panel order CBC With Differential With Platelet.  Procedure                               Abnormality         Status                     ---------                               -----------         ------                     CBC W/ DIFFERENTIAL[[id number]]          Abnormal            Final result                 Please view results for these tests on the individual orders.   RAINBOW DRAW LAVENDER   RAINBOW DRAW LIGHT GREEN   URINE CULTURE, ROUTINE     EKG    Rate,  intervals and axes as noted on EKG Report.  Rate: 78  Rhythm: Sinus Rhythm  Reading: No acute ST or T wave abnormalities when compared to EKG performed August 2021.           ED Course as of 04/23/24 1820  ------------------------------------------------------------  Time: 04/23 1549  Value: Troponin I (High Sensitivity): 16  Comment: (Reviewed)  ------------------------------------------------------------  Time: 04/23 1549  Value: WBC(!): 14.1  Comment: (Reviewed)  ------------------------------------------------------------  Time: 04/23 1549  Value: Glucose(!): 261  Comment: (Reviewed)  ------------------------------------------------------------  Time: 04/23 1634  Value: XR CHEST AP PORTABLE  (CPT=71045)  Comment: Images were independently viewed by me and no infiltrate was noted.  Cardiac and mediastinal silhouettes were normal.  ------------------------------------------------------------  Time: 04/23 1700  Value: Blood Urine(!): Moderate  Comment: (Reviewed)  ------------------------------------------------------------  Time: 04/23 1700  Value: Nitrite Urine(!): Positive  Comment: (Reviewed)  ------------------------------------------------------------  Time: 04/23 1700  Value: Leukocyte Esterase (!): Large  Comment: (Reviewed)  ------------------------------------------------------------  Time: 04/23 1707  Value: CT ABDOMEN+PELVIS(CONTRAST ONLY)(CPT=74177)  Comment: Urothelial appearance consistent with acute inflammation.     Medications   sodium chloride 0.9 % IV bolus 1,000 mL (0 mL Intravenous Stopped 4/23/24 1711)   iopamidol 76% (ISOVUE-370) injection for power injector (100 mL Intravenous Given 4/23/24 1641)       Urinalysis did show evidence of a urinary tract infection.  Urine cultures were ordered.         MDM      Patient comes to the emergency department with generalized lower abdominal pain.  Differential diagnosis included bowel obstruction, acute intraperitoneal pathology causing peritonitis,  urinary tract infection, kidney stone.  Patient had no urinary symptoms, but patient's workup was most consistent with an acute urinary tract infection with positive nitrite and leukocyte esterase.  Patient's abdominal CT revealed inflammatory urothelial findings as well without any other acute intra-abdominal process noted.  After patient's previous urine culture susceptibilities were reviewed, patient was discharged home with prescription for Bactrim.  She was instructed follow-up closely with her primary care physician and to return immediately for any acute change or worsening symptoms.  Patient did not appear toxic or unstable in the emergency department.                                   Medical Decision Making      Disposition and Plan     Clinical Impression:  1. Abdominal pain, acute    2. Urinary tract infection with hematuria, site unspecified         Disposition:  Discharge  4/23/2024  5:10 pm    Follow-up:  [facility] [address]  [phone]    Follow up            Medications Prescribed:  Discharge Medication List as of 4/23/2024  5:11 PM        START taking these medications    Details   sulfamethoxazole-trimethoprim -160 MG Oral Tab per tablet Take 1 tablet by mouth 2 (two) times daily for 10 days., Normal, Disp-20 tablet, R-0

## 2024-04-24 LAB
ATRIAL RATE: 78 BPM
P AXIS: 33 DEGREES
P-R INTERVAL: 192 MS
Q-T INTERVAL: 384 MS
QRS DURATION: 80 MS
QTC CALCULATION (BEZET): 437 MS
R AXIS: 42 DEGREES
T AXIS: 128 DEGREES
VENTRICULAR RATE: 78 BPM

## 2024-05-24 ENCOUNTER — E-ADVICE (OUTPATIENT)
Dept: CARDIOLOGY | Age: 64
End: 2024-05-24

## 2024-10-30 NOTE — PROGRESS NOTES
Subjective    Chief Complaint  This information was obtained from the patient  The patient was seen today for follow up and management of difficult to heal non-healing left foot diabetic ulcers. Patient opened a new wound on the left lateral foot.     Johan Preston Endocrine: Cold Intolerance, Heat Intolerance  Hematologic/Lymphatic: Swollen Glands  Allergic/Immunologic: Recurrent Fevers  Psychiatric: Mental Illness, Suicidal        Objective    Wound Assessment(s)  Wound #1 Left, Plantar Foot is a Ordoñez Grade 1 Tricia The periwound skin exhibited: Edema, Dry/Scaly.  The periwound skin did not exhibit: Brawny Induration, Excoriation, Induration, Callus, Crepitus, Fluctuance, Friable, Rash, Moist, Maceration, Atrophie Shell, Cyanosis, Ecchymosis, Erythema, Hemosiderosis, no clubbing, no cyanosis of digits and nails. No significant deformity or joint abnormality. RLE no edema, LLE edema has reduced by TCC. Peripheral pulses intact. LLE with left charcot foot and open wounds.     Integumentary (Hair, Skin)  Skin normal color, Wound Cleansing & Dressings  Clean wound with Normal Saline or Wound Cleanser. Hydrofera ready  Other: - secure with tape  Change dressing two times per week.     Wound #3 Left, Distal, Lateral Foot    Wound Cleansing & Dressings  Clean wound with Normal Entered By: Lilli Benavides on 04/13/2020 2:51:52 PM  Treatment Notes Summary  Wound #1 (Left, Plantar Foot)  . Wound Treatment Note  Assessed patient’s pain status and effectiveness of pain management plan.   Limb cleansed using Betasept and water (1), Soap a Dressing secured with non-allergenic tape/stockinet/wrap. using Adela (1), Medipore (1), Silk tape (1)  Applied Offloading device.  using 1/4 (2)  Offloading  Applied offloading shoe wear: using Post-op shoe (1) No

## (undated) DEVICE — PREP BETADINE SOL 5% EYE

## (undated) DEVICE — SOLUTION IRR BSS+

## (undated) DEVICE — Device

## (undated) DEVICE — Device: Brand: JELCO

## (undated) DEVICE — 3M™ MICROFOAM™ SURGICAL TAPE, 2 INCHES X 5 YARDS, 6 ROLLS/CARTON, 6 CARTONS/CASE, 1528-2: Brand: 3M™ MICROFOAM™

## (undated) DEVICE — PROBE LASER ENDO DISP 25 GA

## (undated) DEVICE — REVOLUTION DSP 25G ILM FORCEPS: Brand: ALCON GRIESHABER REVOLUTION

## (undated) DEVICE — LAWSON - SHEET SPEC PROC 78CMX106CM STL

## (undated) DEVICE — REVOLUTION DSP 25G CURVED SCISSORS: Brand: ALCON GRIESHABER REVOLUTION

## (undated) DEVICE — LAWSON - SYR 5ML LL STL

## (undated) DEVICE — APPLICATOR COTTON TIP 3 10/PK

## (undated) DEVICE — LENS VITRTM FLT DISP

## (undated) DEVICE — LAWSON - SHIELD RADPAD FEMORAL BLUE

## (undated) DEVICE — ENCORE® LATEX MICRO SIZE 6.5, STERILE LATEX POWDER-FREE SURGICAL GLOVE: Brand: ENCORE

## (undated) DEVICE — SOLUTION IRR BTL 250CC NACL

## (undated) DEVICE — PACK SRG BIOM FULL DRP STRL

## (undated) DEVICE — RETINAL: Brand: MEDLINE INDUSTRIES, INC.

## (undated) DEVICE — WATER STERILE AQUALITE 1000ML

## (undated) DEVICE — LAWSON - SYR 3ML LL LF DISP 3ML